# Patient Record
Sex: FEMALE | Race: BLACK OR AFRICAN AMERICAN | Employment: PART TIME | URBAN - METROPOLITAN AREA
[De-identification: names, ages, dates, MRNs, and addresses within clinical notes are randomized per-mention and may not be internally consistent; named-entity substitution may affect disease eponyms.]

---

## 2019-11-27 ENCOUNTER — OFFICE VISIT (OUTPATIENT)
Dept: FAMILY MEDICINE CLINIC | Age: 19
End: 2019-11-27

## 2019-11-27 VITALS
OXYGEN SATURATION: 99 % | HEIGHT: 64 IN | HEART RATE: 98 BPM | RESPIRATION RATE: 18 BRPM | SYSTOLIC BLOOD PRESSURE: 142 MMHG | WEIGHT: 194 LBS | BODY MASS INDEX: 33.12 KG/M2 | DIASTOLIC BLOOD PRESSURE: 80 MMHG | TEMPERATURE: 98.2 F

## 2019-11-27 DIAGNOSIS — I82.5Y2 CHRONIC DEEP VEIN THROMBOSIS (DVT) OF PROXIMAL VEIN OF LEFT LOWER EXTREMITY (HCC): ICD-10-CM

## 2019-11-27 DIAGNOSIS — G89.29 CHRONIC PAIN OF LEFT ANKLE: ICD-10-CM

## 2019-11-27 DIAGNOSIS — M32.9 SYSTEMIC LUPUS ERYTHEMATOSUS, UNSPECIFIED SLE TYPE, UNSPECIFIED ORGAN INVOLVEMENT STATUS (HCC): Primary | ICD-10-CM

## 2019-11-27 DIAGNOSIS — M79.605 LEFT LEG PAIN: ICD-10-CM

## 2019-11-27 DIAGNOSIS — M79.672 LEFT FOOT PAIN: ICD-10-CM

## 2019-11-27 DIAGNOSIS — M25.572 CHRONIC PAIN OF LEFT ANKLE: ICD-10-CM

## 2019-11-27 RX ORDER — SIMVASTATIN 20 MG/1
TABLET, FILM COATED ORAL
COMMUNITY
End: 2021-08-24 | Stop reason: SDUPTHER

## 2019-11-27 RX ORDER — ERGOCALCIFEROL 1.25 MG/1
50000 CAPSULE ORAL
COMMUNITY
End: 2022-03-01

## 2019-11-27 RX ORDER — HYDROXYCHLOROQUINE SULFATE 200 MG/1
200 TABLET, FILM COATED ORAL DAILY
COMMUNITY
End: 2021-07-09 | Stop reason: SDUPTHER

## 2019-11-27 RX ORDER — MYCOPHENOLATE MOFETIL 500 MG/1
500 TABLET ORAL 2 TIMES DAILY
COMMUNITY
End: 2019-12-03 | Stop reason: SDUPTHER

## 2019-11-27 RX ORDER — PREDNISONE 10 MG/1
TABLET ORAL
COMMUNITY
End: 2021-11-11

## 2019-11-27 RX ORDER — OMEPRAZOLE 40 MG/1
40 CAPSULE, DELAYED RELEASE ORAL DAILY
COMMUNITY
End: 2021-08-24 | Stop reason: SDUPTHER

## 2019-11-27 NOTE — PROGRESS NOTES
Dennie Floro presents today for   Chief Complaint   Patient presents with    New Patient    Lupus       Is someone accompanying this pt? no    Is the patient using any DME equipment during [address]? no    Depression Screening:  3 most recent PHQ Screens 11/27/2019   Little interest or pleasure in doing things Not at all   Feeling down, depressed, irritable, or hopeless Not at all   Total Score PHQ 2 0       Learning Assessment:  Learning Assessment 11/27/2019   PRIMARY LEARNER Patient   HIGHEST LEVEL OF EDUCATION - PRIMARY LEARNER  GRADUATED HIGH SCHOOL OR GED   BARRIERS PRIMARY LEARNER NONE   CO-LEARNER CAREGIVER No   PRIMARY LANGUAGE ENGLISH   LEARNER PREFERENCE PRIMARY VIDEOS   ANSWERED BY self   RELATIONSHIP SELF       Abuse Screening:  No flowsheet data found. Fall Risk  No flowsheet data found. Health Maintenance reviewed     Health Maintenance Due   Topic Date Due    HPV Age 9Y-34Y (1 - Female 2-dose series) 06/24/2009    DTaP/Tdap/Td series (1 - Tdap) 06/24/2009    PAP AKA CERVICAL CYTOLOGY  06/24/2019    Influenza Age 9 to Adult  08/01/2019   . Coordination of Care:  1. Have you been to the ER, urgent care clinic since your last visit? Hospitalized since your last visit? no    2. Have you seen or consulted any other health care providers outside of the [address] [place] [address] since your last visit? Include any pap smears or colon screening.  no      Last  Checked n/a  Last UDS Checked n/a  Last Pain contract signed: n/a

## 2019-11-27 NOTE — PROGRESS NOTES
HISTORY OF PRESENT ILLNESS  Ladi Paul is a 22 y.o. female. Patient is here to establish care. Patient was recently diagnosed with Lupus last year and she is under the care of a rheumatologist.  She mentions this month her lupus was flared up and she was in the hospital admitted. She lost her grandmother. Patient also had a history of DVT of the left lower leg. She mentions she has been having pain in the left lower leg and she went last week at [facility] and she was told she had no blood clots. I do not have any report of this. She mentions she had so much blood work done at  [facility].   She mentions her left ankle and foot pain has been getting worse. She would like to have x-rays done and she also has an apt with her rheumatologist next week. She will sign a release of her records. New Patient   The history is provided by the patient. This is a new problem. The problem occurs constantly. The problem has not changed since onset. Pertinent negatives include no chest pain, no abdominal pain, no headaches and no shortness of breath. Lupus   The history is provided by the patient. This is a chronic problem. The problem occurs constantly. The problem has been gradually improving. Pertinent negatives include no chest pain, no abdominal pain, no headaches and no shortness of breath. The symptoms are aggravated by stress. The symptoms are relieved by medications. Treatments tried: on meds. Foot Pain   The history is provided by the patient. This is a chronic problem. The problem occurs constantly. The problem has been gradually worsening. Pertinent negatives include no chest pain, no abdominal pain, no headaches and no shortness of breath. Ankle Pain    Associated symptoms include back pain and neck pain. Review of Systems   Constitutional: Positive for malaise/fatigue. Negative for chills and fever.    HENT: Negative for congestion, ear discharge, ear pain, hearing loss, sinus pain and sore throat. Eyes: Negative for blurred vision, double vision and pain. Respiratory: Negative for cough, hemoptysis, shortness of breath and wheezing. Cardiovascular: Negative for chest pain, palpitations and leg swelling. Gastrointestinal: Negative for abdominal pain, blood in stool, constipation, nausea and vomiting. Musculoskeletal: Positive for back pain, falls, joint pain and neck pain. Neurological: Negative for dizziness and headaches. Psychiatric/Behavioral: Negative for depression, hallucinations, substance abuse and suicidal ideas. The patient is nervous/anxious and has insomnia. Visit Vitals  /80   Pulse 98   Temp 98.2 °F (36.8 °C) (Oral)   Resp 18   Ht 5' 4\" (1.626 m)   Wt 194 lb (88 kg)   SpO2 99%   BMI 33.30 kg/m²     Family History   Problem Relation Age of Onset    Hypertension Father     Asthma Maternal Aunt     Headache Maternal Aunt     Hypertension Maternal Aunt     Migraines Maternal Aunt     Arthritis-osteo Maternal Grandmother     Diabetes Maternal Grandmother     Elevated Lipids Maternal Grandmother     Hypertension Maternal Grandmother     Stroke Maternal Grandmother     Heart Disease Maternal Grandmother     Hypertension Maternal Grandfather      There is no problem list on file for this patient. Past Medical History:   Diagnosis Date    Lupus ([address] 09/2018     Past Surgical History:   Procedure Laterality Date    HX TONSILLECTOMY      HX WISDOM TEETH EXTRACTION       Current Outpatient Medications on File Prior to Visit   Medication Sig Dispense Refill    mycophenolate (CELLCEPT) 500 mg tablet Take 500 mg by mouth two (2) times a day.  predniSONE (DELTASONE) 10 mg tablet Take  by mouth daily (with breakfast).  hydroxychloroquine (PLAQUENIL) 200 mg tablet Take 200 mg by mouth daily. Every 12 hours      simvastatin (ZOCOR) 20 mg tablet Take  by mouth nightly.       ergocalciferol (VITAMIN D2) 50,000 unit capsule Take 50,000 Units by mouth.  omeprazole (PRILOSEC) 40 mg capsule Take 40 mg by mouth daily. No current facility-administered medications on file prior to visit. Allergies   Allergen Reactions    Pcn [Penicillins] Hives and Itching       Physical Exam  Constitutional:       Appearance: Normal appearance. HENT:      Head: Normocephalic and atraumatic. Right Ear: Tympanic membrane normal. There is no impacted cerumen. Left Ear: Tympanic membrane normal.      Nose: Congestion present. No rhinorrhea. Mouth/Throat:      Mouth: Mucous membranes are moist.      Pharynx: No oropharyngeal exudate or posterior oropharyngeal erythema. Eyes:      General:         Right eye: No discharge. Left eye: No discharge. Conjunctiva/sclera: Conjunctivae normal.      Pupils: Pupils are equal, round, and reactive to light. Neck:      Musculoskeletal: Normal range of motion. No neck rigidity. Cardiovascular:      Rate and Rhythm: Normal rate and regular rhythm. Heart sounds: No murmur. Pulmonary:      Effort: Pulmonary effort is normal. No respiratory distress. Breath sounds: Normal breath sounds. No stridor. No wheezing or rhonchi. Abdominal:      General: Bowel sounds are normal. There is no distension. Palpations: Abdomen is soft. Tenderness: There is no tenderness. Musculoskeletal:         General: Swelling present. Left ankle: She exhibits decreased range of motion and swelling. Left lower leg: She exhibits tenderness, bony tenderness and swelling. Edema present. Legs:         Feet:    Neurological:      General: No focal deficit present. Mental Status: She is alert.    Psychiatric:         Mood and Affect: Mood normal.         ASSESSMENT and PLAN  History of lupus with left  lower leg pain / left ankle pain and left foot pain:  - dicussed  Ordering x-rays   - continue current medical therapy   - please follow up with rheumatologist

## 2019-12-02 ENCOUNTER — TELEPHONE (OUTPATIENT)
Dept: FAMILY MEDICINE CLINIC | Age: 19
End: 2019-12-02

## 2019-12-03 ENCOUNTER — OFFICE VISIT (OUTPATIENT)
Dept: FAMILY MEDICINE CLINIC | Age: 19
End: 2019-12-03

## 2019-12-03 VITALS
RESPIRATION RATE: 18 BRPM | SYSTOLIC BLOOD PRESSURE: 133 MMHG | TEMPERATURE: 96.8 F | HEIGHT: 64 IN | HEART RATE: 107 BPM | OXYGEN SATURATION: 99 % | DIASTOLIC BLOOD PRESSURE: 78 MMHG | BODY MASS INDEX: 32.44 KG/M2 | WEIGHT: 190 LBS

## 2019-12-03 DIAGNOSIS — I82.5Y2 CHRONIC DEEP VEIN THROMBOSIS (DVT) OF PROXIMAL VEIN OF LEFT LOWER EXTREMITY (HCC): ICD-10-CM

## 2019-12-03 DIAGNOSIS — R59.0 AXILLARY LYMPHADENOPATHY: ICD-10-CM

## 2019-12-03 DIAGNOSIS — M79.605 LEFT LEG PAIN: Primary | ICD-10-CM

## 2019-12-03 DIAGNOSIS — R59.0 MEDIASTINAL LYMPHADENOPATHY: ICD-10-CM

## 2019-12-03 DIAGNOSIS — I82.4Y2 ACUTE DEEP VEIN THROMBOSIS (DVT) OF PROXIMAL VEIN OF LEFT LOWER EXTREMITY (HCC): ICD-10-CM

## 2019-12-03 RX ORDER — MYCOPHENOLATE MOFETIL 500 MG/1
1000 TABLET ORAL 2 TIMES DAILY
Qty: 120 TAB | Refills: 0 | Status: SHIPPED | OUTPATIENT
Start: 2019-12-03 | End: 2019-12-18 | Stop reason: SDUPTHER

## 2019-12-03 NOTE — PROGRESS NOTES
HISTORY OF PRESENT ILLNESS  Ladi Cervnates is a 22 y.o. female. Patient is here for her hospital follow up shortly after she saw me in the office last week. She was found to have a acute left DVT. She went to [facility].  She was started on the xeralto pack and is currently taking 15 mg twice a day. She mentions she has an apt in january with vascular specialist. I have discussed referral to hematology as she has a history of lupus and for further anticoagulation work up. She is aware she must stay on this medication as it was stopped in the past.  Both duplex and ct chest have been reviewed. Ct chest shows the following :Suboptimal exam but negative for PE. 2. Mild multifocal mediastinal and bilateral axillary adenopathy, unchanged and of unknown etiology. Differential diagnosis would include sarcoidosis and lymphoproliferative disorders. After reviewing this I have discussed referral to a lung specialist. She is already under the care of a nephrologist.    I have also discussed that she should be taking her prednisone 5 mg not 10 mg. She mentions she only 3 pills left of her medication for lupus and would like a refill as she will  Be changing her rheumatologist.  She mentions she contonues to have joint pain but feels better. Hospital Follow Up   The history is provided by the patient. This is a new problem. The current episode started more than 2 days ago. The problem occurs constantly. The problem has been gradually improving. Pertinent negatives include no chest pain, no abdominal pain, no headaches and no shortness of breath. The symptoms are aggravated by walking, standing and exertion. Nothing relieves the symptoms. Treatments tried: oral anticoagulation  The treatment provided moderate relief. Review of Systems   Constitutional: Positive for malaise/fatigue. Negative for fever. HENT: Negative for congestion, hearing loss, nosebleeds, sinus pain and sore throat.     Eyes: Negative for blurred vision, double vision, pain and discharge. Respiratory: Negative for cough, sputum production, shortness of breath, wheezing and stridor. Cardiovascular: Negative for chest pain. Gastrointestinal: Negative for abdominal pain, blood in stool and constipation. Genitourinary: Negative for dysuria and frequency. Musculoskeletal: Positive for joint pain. Skin: Negative for rash. Neurological: Negative for tingling, focal weakness, weakness and headaches. Endo/Heme/Allergies: Negative for environmental allergies. Psychiatric/Behavioral: Negative for depression. The patient is not nervous/anxious. Visit Vitals  /78   Pulse (!) 107   Temp 96.8 °F (36 °C) (Oral)   Resp 18   Ht 5' 4\" (1.626 m)   Wt 190 lb (86.2 kg)   SpO2 99%   BMI 32.61 kg/m²       Physical Exam  Constitutional:       General: She is not in acute distress. Appearance: Normal appearance. She is obese. She is not ill-appearing. HENT:      Head: Normocephalic and atraumatic. Right Ear: Tympanic membrane normal. There is no impacted cerumen. Left Ear: Tympanic membrane normal. There is no impacted cerumen. Nose: Nose normal. No congestion or rhinorrhea. Mouth/Throat:      Mouth: Mucous membranes are moist.      Pharynx: No oropharyngeal exudate or posterior oropharyngeal erythema. Eyes:      General:         Right eye: No discharge. Left eye: No discharge. Extraocular Movements: Extraocular movements intact. Conjunctiva/sclera: Conjunctivae normal.      Pupils: Pupils are equal, round, and reactive to light. Neck:      Musculoskeletal: Normal range of motion. Cardiovascular:      Rate and Rhythm: Normal rate and regular rhythm. Heart sounds: No murmur. Pulmonary:      Effort: Pulmonary effort is normal. No respiratory distress. Breath sounds: Normal breath sounds. No wheezing or rhonchi. Abdominal:      General: Bowel sounds are normal. There is no distension. Palpations: Abdomen is soft. Tenderness: There is no tenderness. Musculoskeletal:         General: Swelling present. Right lower leg: Edema present. Left lower leg: Edema present. Lymphadenopathy:      Cervical: No cervical adenopathy. Skin:     General: Skin is warm. Findings: No erythema. Neurological:      General: No focal deficit present. Mental Status: She is alert.    Psychiatric:         Mood and Affect: Mood normal.         ASSESSMENT and PLAN  Acute on chronic left lower leg DVT:  - please continue xeralto pack  - ok to keep apt with vascular specialist  - discussed CT scan of chest   - discussed referral to hematology  And pulmonary     SLE:  - please follow up with rheumatology

## 2019-12-04 ENCOUNTER — TELEPHONE (OUTPATIENT)
Dept: FAMILY MEDICINE CLINIC | Age: 19
End: 2019-12-04

## 2019-12-05 NOTE — TELEPHONE ENCOUNTER
I have spoken to patient I have also given her referral information for pulmonary specialist dr Theresa Lopez however they do not participate in [place]. Iris Jara can you see which pulmonary specialist she can be referred to  And give her referral information and the referral can be changed over to this provider.

## 2019-12-05 NOTE — TELEPHONE ENCOUNTER
I spoke to patient, informed her will work on her referral tomorrow and that her letter is ready. I also told her that if we cant find someone to takes her insurance then, she will have to call her insurance to find out.  She stated ok

## 2019-12-06 ENCOUNTER — TELEPHONE (OUTPATIENT)
Dept: FAMILY MEDICINE CLINIC | Age: 19
End: 2019-12-06

## 2019-12-06 NOTE — LETTER
NOTIFICATION RETURN TO WORK / SCHOOL 
 
12/9/2019 9:18 AM 
 
Ms. Tanmay Molina [address] [address] To Whom It May Concern: 
 
Tanmay Molina is currently under the care of [facility]. She will return to work/school on 12/16/19. If there are questions or concerns please have the patient contact our office. Sincerely, Claudia Mesa MD

## 2019-12-06 NOTE — TELEPHONE ENCOUNTER
Return to work letter was done for pt to return to work on 12/16/19. Pt is requesting it be changed for her to return to work on 12/11/19.

## 2019-12-09 NOTE — TELEPHONE ENCOUNTER
Pt called and stated she has a new \"pain\" in the leg and would like to speak to provider's nurse. Sending message to Nurse Koo.

## 2019-12-10 NOTE — TELEPHONE ENCOUNTER
I called patient, informed her we are unable to find  Some to take her insurance, I told her to call her insurance and ask them who is in her network and to call us back, she stated ok

## 2019-12-18 ENCOUNTER — TELEPHONE (OUTPATIENT)
Dept: FAMILY MEDICINE CLINIC | Age: 19
End: 2019-12-18

## 2019-12-18 DIAGNOSIS — M32.9 SYSTEMIC LUPUS ERYTHEMATOSUS, UNSPECIFIED SLE TYPE, UNSPECIFIED ORGAN INVOLVEMENT STATUS (HCC): ICD-10-CM

## 2019-12-18 DIAGNOSIS — R59.0 AXILLARY LYMPHADENOPATHY: Primary | ICD-10-CM

## 2019-12-18 DIAGNOSIS — R59.0 MEDIASTINAL LYMPHADENOPATHY: ICD-10-CM

## 2019-12-18 RX ORDER — MYCOPHENOLATE MOFETIL 500 MG/1
1000 TABLET ORAL 2 TIMES DAILY
Qty: 120 TAB | Refills: 0 | Status: SHIPPED | OUTPATIENT
Start: 2019-12-18 | End: 2021-07-09 | Stop reason: SDUPTHER

## 2019-12-18 RX ORDER — MYCOPHENOLATE MOFETIL 500 MG/1
500 TABLET ORAL 2 TIMES DAILY
Qty: 90 TAB | Refills: 3 | Status: SHIPPED | OUTPATIENT
Start: 2019-12-18 | End: 2022-05-03

## 2019-12-18 NOTE — TELEPHONE ENCOUNTER
Pt called and stated that she wants a referral sent to: [facility], Dr Jaya Bustamante in [place], please.

## 2019-12-19 ENCOUNTER — TELEPHONE (OUTPATIENT)
Dept: FAMILY MEDICINE CLINIC | Age: 19
End: 2019-12-19

## 2019-12-19 NOTE — TELEPHONE ENCOUNTER
Dr Alegre Alert office called and requested we refax referral info to them there fax machine was not working earlier this week.

## 2019-12-23 NOTE — TELEPHONE ENCOUNTER
Dr Harmon Alt   [facility]  [address]a [address]illo [address], [address] [place] [address]  Phone: [phone]  Fax: [phone]    Faxed notes, confirmation received.

## 2019-12-27 ENCOUNTER — TELEPHONE (OUTPATIENT)
Dept: FAMILY MEDICINE CLINIC | Age: 19
End: 2019-12-27

## 2019-12-27 DIAGNOSIS — I82.4Y2 ACUTE DEEP VEIN THROMBOSIS (DVT) OF PROXIMAL VEIN OF LEFT LOWER EXTREMITY (HCC): Primary | ICD-10-CM

## 2019-12-27 NOTE — TELEPHONE ENCOUNTER
Patient states that Dr Jami Lovell told her to call for a refill of Xarelto. Patient is currently completing her starter pack and is taking 20mg daily. Informed patient that her provider is not in the office. [facility] since patient states that she will be out of her medication on Sunday.

## 2020-05-21 NOTE — TELEPHONE ENCOUNTER
Requested Prescriptions     Pending Prescriptions Disp Refills    mycophenolate (CELLCEPT) 500 mg tablet       Sig: Take 1 Tab by mouth two (2) times a day.  mycophenolate (CELLCEPT) 500 mg tablet 120 Tab 0     Sig: Take 2 Tabs by mouth two (2) times a day.
fair balance

## 2021-07-09 ENCOUNTER — OFFICE VISIT (OUTPATIENT)
Dept: FAMILY MEDICINE CLINIC | Age: 21
End: 2021-07-09
Payer: MEDICAID

## 2021-07-09 ENCOUNTER — APPOINTMENT (OUTPATIENT)
Dept: FAMILY MEDICINE CLINIC | Age: 21
End: 2021-07-09

## 2021-07-09 VITALS
BODY MASS INDEX: 38 KG/M2 | TEMPERATURE: 97.5 F | SYSTOLIC BLOOD PRESSURE: 120 MMHG | RESPIRATION RATE: 15 BRPM | HEIGHT: 64 IN | DIASTOLIC BLOOD PRESSURE: 86 MMHG | WEIGHT: 222.6 LBS | OXYGEN SATURATION: 100 % | HEART RATE: 74 BPM

## 2021-07-09 DIAGNOSIS — M32.9 SLE (SYSTEMIC LUPUS ERYTHEMATOSUS RELATED SYNDROME) (HCC): ICD-10-CM

## 2021-07-09 DIAGNOSIS — Z00.00 ROUTINE GENERAL MEDICAL EXAMINATION AT A HEALTH CARE FACILITY: Primary | ICD-10-CM

## 2021-07-09 DIAGNOSIS — Z00.00 ROUTINE GENERAL MEDICAL EXAMINATION AT A HEALTH CARE FACILITY: ICD-10-CM

## 2021-07-09 DIAGNOSIS — E55.9 VITAMIN D DEFICIENCY: ICD-10-CM

## 2021-07-09 PROCEDURE — 99395 PREV VISIT EST AGE 18-39: CPT | Performed by: NURSE PRACTITIONER

## 2021-07-09 RX ORDER — ALBUTEROL SULFATE 90 UG/1
2 AEROSOL, METERED RESPIRATORY (INHALATION)
COMMUNITY

## 2021-07-09 RX ORDER — HYDROCORTISONE BUTYRATE 1 MG/G
CREAM TOPICAL
COMMUNITY
Start: 2021-05-21 | End: 2022-04-06

## 2021-07-09 RX ORDER — FLUTICASONE PROPIONATE 50 MCG
2 SPRAY, SUSPENSION (ML) NASAL DAILY
COMMUNITY
End: 2022-04-27 | Stop reason: SDUPTHER

## 2021-07-09 RX ORDER — LORATADINE 10 MG/1
TABLET ORAL
COMMUNITY
End: 2021-08-24 | Stop reason: SDUPTHER

## 2021-07-09 RX ORDER — LOSARTAN POTASSIUM 25 MG/1
25 TABLET ORAL DAILY
COMMUNITY
End: 2021-11-11

## 2021-07-09 NOTE — PROGRESS NOTES
Belinda Johnson presents today for   Chief Complaint   Patient presents with    New Patient    Physical       Is someone accompanying this pt? no    Is the patient using any DME equipment during OV? no    Depression Screening:  3 most recent PHQ Screens 7/9/2021   Little interest or pleasure in doing things Not at all   Feeling down, depressed, irritable, or hopeless Not at all   Total Score PHQ 2 0       Learning Assessment:  Learning Assessment 7/9/2021   PRIMARY LEARNER Patient   HIGHEST LEVEL OF EDUCATION - PRIMARY LEARNER  -   BARRIERS PRIMARY LEARNER -   CO-LEARNER CAREGIVER -   PRIMARY LANGUAGE ENGLISH   LEARNER PREFERENCE PRIMARY DEMONSTRATION     READING   ANSWERED BY patient    RELATIONSHIP SELF       Travel Screening:    Travel Screening     Question   Response    In the last month, have you been in contact with someone who was confirmed or suspected to have [address] / COVID-19? No / Unsure    Have you had a COVID-19 viral test in the last 14 days? No    Do you have any of the following new or worsening symptoms? None of these    Have you traveled internationally or domestically in the last month? No      Travel History   Travel since 06/09/21     No documented travel since 06/09/21          Health Maintenance reviewed and discussed and ordered per Provider. Health Maintenance Due   Topic Date Due    Hepatitis C Screening  Never done    COVID-19 Vaccine (1) Never done    HPV Age 9Y-34Y (2 - 3-dose series) 05/26/2017    DTaP/Tdap/Td series (1 - Tdap) Never done    PAP AKA CERVICAL CYTOLOGY  Never done    Lipid Screen  02/27/2021   . Coordination of Care:  1. Have you been to the ER, urgent care clinic since your last visit? Hospitalized since your last visit? Yes.     2. Have you seen or consulted any other health care providers outside of the [address] [place] [address] since your last visit? Include any pap smears or colon screening. Yes.

## 2021-07-09 NOTE — PROGRESS NOTES
HPI  19 y.o. female is presenting for annual exam and complete physical.  Her gynecologic and breast care are done elsewhere by her OB/GYN physician. Most recent Pap smear : unsure  Prior Pap result: Normal.  Prior cervical/vaginal disease: Normal exam without visible pathology. Prior cervical treatment: no treatment. Specific concerns today: none. Past Medical History:   Diagnosis Date    Acid reflux     [facility] ([facility] [address]     Lupus ([facility] [address] 09/2018       Past Surgical History:   Procedure Laterality Date    HX TONSIL AND ADENOIDECTOMY      HX TONSILLECTOMY      HX WISDOM TEETH EXTRACTION         Family History   Problem Relation Age of Onset    Hypertension Father     Asthma Maternal Aunt     Headache Maternal Aunt     Hypertension Maternal Aunt     Migraines Maternal Aunt     Arthritis-osteo Maternal Grandmother     Diabetes Maternal Grandmother     Elevated Lipids Maternal Grandmother     Hypertension Maternal Grandmother     Stroke Maternal Grandmother     Heart Disease Maternal Grandmother     Hypertension Maternal Grandfather        Social History     Tobacco Use    Smoking status: Never Smoker    Smokeless tobacco: Never Used   Substance Use Topics    Alcohol use: Not Currently    Drug use: Never       Current Outpatient Medications on File Prior to Visit   Medication Sig Dispense Refill    losartan (COZAAR) 25 mg tablet Take 25 mg by mouth daily.  albuterol (PROVENTIL HFA, VENTOLIN HFA, PROAIR HFA) 90 mcg/actuation inhaler Take  by inhalation.  fluticasone propionate (FLONASE) 50 mcg/actuation nasal spray fluticasone propionate 50 mcg/actuation nasal spray,suspension   Spray 2 sprays every day by intranasal route for 30 days.  hydrocortisone butyr-emollient 0.1 % topical cream       loratadine (CLARITIN) 10 mg tablet loratadine 10 mg tablet   Take 1 tablet every day by oral route for 30 days.  rivaroxaban (XARELTO) 20 mg tab tablet Take 1 Tab by mouth daily. 30 Tab 1    mycophenolate (CELLCEPT) 500 mg tablet Take 1 Tab by mouth two (2) times a day. 90 Tab 3    simvastatin (ZOCOR) 20 mg tablet Take  by mouth nightly.  omeprazole (PRILOSEC) 40 mg capsule Take 40 mg by mouth daily.  hydroxychloroquine (PLAQUENIL) 200 mg tablet Take 200 mg by mouth daily.  predniSONE (DELTASONE) 10 mg tablet Take  by mouth daily (with breakfast). (Patient not taking: Reported on 7/9/2021)      ergocalciferol (VITAMIN D2) 50,000 unit capsule Take 50,000 Units by mouth. (Patient not taking: Reported on 7/9/2021)      simvastatin (ZOCOR) 10 mg tablet Take  by mouth nightly. (Patient not taking: Reported on 7/9/2021)      lisinopril (PRINIVIL, ZESTRIL) 10 mg tablet Take  by mouth daily. (Patient not taking: Reported on 7/9/2021)      predniSONE (DELTASONE) 20 mg tablet Take  by mouth daily (with breakfast). (Patient not taking: Reported on 7/9/2021)      predniSONE (DELTASONE) 20 mg tablet Take 40 mg by mouth daily (with breakfast). (Patient not taking: Reported on 7/9/2021) 8 Tab 0    nystatin (MYCOSTATIN) 100,000 unit/mL suspension Take 5 mL by mouth four (4) times daily. swish and spit (Patient not taking: Reported on 7/9/2021) 120 mL 0     No current facility-administered medications on file prior to visit. Allergies   Allergen Reactions    Cephalexin Other (comments)     Patient c/o severe chest pain      Metronidazole Other (comments)    Pcn [Penicillins] Hives    Pcn [Penicillins] Hives and Itching       Review of Systems   Constitutional: Negative for chills, fever, malaise/fatigue and weight loss. HENT: Negative for congestion, ear pain, hearing loss, sinus pain, sore throat and tinnitus. Eyes: Negative for blurred vision, double vision, photophobia and pain. Respiratory: Negative for cough and shortness of breath. Cardiovascular: Negative for chest pain, palpitations and leg swelling.    Gastrointestinal: Negative for abdominal pain, constipation, diarrhea, heartburn, nausea and vomiting. Genitourinary: Negative for dysuria, frequency and urgency. Musculoskeletal: Negative for back pain, joint pain and myalgias. Skin: Negative for rash. Neurological: Negative for dizziness and headaches. Psychiatric/Behavioral: Negative for depression and suicidal ideas. The patient is not nervous/anxious. PE  /86 (BP 1 Location: Left upper arm, BP Patient Position: Sitting, BP Cuff Size: Large adult)   Pulse 74   Temp 97.5 °F (36.4 °C) (Temporal)   Resp 15   Ht 5' 4\" (1.626 m)   Wt 222 lb 9.6 oz (101 kg)   LMP 07/02/2021 (Approximate)   SpO2 100%   BMI 38.21 kg/m²     Physical Exam  Vitals reviewed. Constitutional:       General: She is not in acute distress. Appearance: Normal appearance. HENT:      Head: Normocephalic and atraumatic. Right Ear: Tympanic membrane, ear canal and external ear normal.      Left Ear: Tympanic membrane, ear canal and external ear normal.      Nose: Nose normal. No nasal deformity, congestion or rhinorrhea. Mouth/Throat:      Lips: Pink. No lesions. Mouth: Mucous membranes are moist. No oral lesions. Dentition: Normal dentition. Tongue: No lesions. Pharynx: Oropharynx is clear. Eyes:      General: Lids are normal.      Extraocular Movements: Extraocular movements intact. Conjunctiva/sclera: Conjunctivae normal.      Pupils: Pupils are equal, round, and reactive to light. Neck:      Thyroid: No thyroid mass, thyromegaly or thyroid tenderness. Cardiovascular:      Rate and Rhythm: Normal rate and regular rhythm. Pulses:           Dorsalis pedis pulses are 2+ on the right side and 2+ on the left side. Heart sounds: S1 normal and S2 normal. No murmur heard. No friction rub. No gallop. No S3 or S4 sounds. Abdominal:      General: Abdomen is flat. Bowel sounds are normal. There is no distension. Palpations: Abdomen is soft.  There is no hepatomegaly, splenomegaly or mass. Tenderness: There is no abdominal tenderness. There is no guarding or rebound. Musculoskeletal:      Cervical back: Full passive range of motion without pain. Right lower leg: No edema. Left lower leg: No edema. Lymphadenopathy:      Head:      Right side of head: No submental, submandibular, tonsillar, preauricular, posterior auricular or occipital adenopathy. Left side of head: No submental, submandibular, tonsillar, preauricular, posterior auricular or occipital adenopathy. Cervical: No cervical adenopathy. Upper Body:      Right upper body: No supraclavicular adenopathy. Left upper body: No supraclavicular adenopathy. Skin:     General: Skin is warm and dry. Capillary Refill: Capillary refill takes less than 2 seconds. Neurological:      General: No focal deficit present. Mental Status: She is alert and oriented to person, place, and time. Cranial Nerves: Cranial nerves are intact. Sensory: Sensation is intact. Motor: Motor function is intact. Coordination: Coordination is intact. Gait: Gait is intact. Deep Tendon Reflexes:      Reflex Scores:       Patellar reflexes are 2+ on the right side and 2+ on the left side. Psychiatric:         Attention and Perception: Attention and perception normal.         Mood and Affect: Mood and affect normal.         Speech: Speech normal.         Behavior: Behavior normal.         Thought Content: Thought content normal.         Cognition and Memory: Cognition and memory normal.         Judgment: Judgment normal.           Assessment/Plan  1. Preventive care  Fasting labs  Check on last pap and schedule well woman exam as appropriate    2.  SLE  Continue to FU with rheumatology

## 2021-07-10 LAB
25(OH)D3 SERPL-MCNC: 20.6 NG/ML (ref 32–100)
A-G RATIO,AGRAT: 1.6 RATIO (ref 1.1–2.6)
ABSOLUTE LYMPHOCYTE COUNT, 10803: 1.4 K/UL (ref 1–4.8)
ALBUMIN SERPL-MCNC: 4.4 G/DL (ref 3.5–5)
ALP SERPL-CCNC: 109 U/L (ref 25–115)
ALT SERPL-CCNC: 16 U/L (ref 5–40)
ANION GAP SERPL CALC-SCNC: 14.1 MMOL/L (ref 3–15)
AST SERPL W P-5'-P-CCNC: 20 U/L (ref 10–37)
BASOPHILS # BLD: 0 K/UL (ref 0–0.2)
BASOPHILS NFR BLD: 1 % (ref 0–2)
BILIRUB SERPL-MCNC: 0.3 MG/DL (ref 0.2–1.2)
BUN SERPL-MCNC: 11 MG/DL (ref 6–22)
CALCIUM SERPL-MCNC: 9.7 MG/DL (ref 8.4–10.5)
CHLORIDE SERPL-SCNC: 100 MMOL/L (ref 98–110)
CHOLEST SERPL-MCNC: 185 MG/DL (ref 110–200)
CO2 SERPL-SCNC: 24 MMOL/L (ref 20–32)
CREAT SERPL-MCNC: 0.6 MG/DL (ref 0.5–1.2)
EOSINOPHIL # BLD: 0 K/UL (ref 0–0.5)
EOSINOPHIL NFR BLD: 1 % (ref 0–6)
ERYTHROCYTE [DISTWIDTH] IN BLOOD BY AUTOMATED COUNT: 13.4 % (ref 10–15.5)
GFRAA, 66117: >60
GFRNA, 66118: >60
GLOBULIN,GLOB: 2.7 G/DL (ref 2–4)
GLUCOSE SERPL-MCNC: 78 MG/DL (ref 70–99)
GRANULOCYTES,GRANS: 61 % (ref 40–75)
HCT VFR BLD AUTO: 43.9 % (ref 35.1–46.5)
HDLC SERPL-MCNC: 2.8 MG/DL (ref 0–5)
HDLC SERPL-MCNC: 67 MG/DL
HGB BLD-MCNC: 13.2 G/DL (ref 11.7–15.5)
LDL/HDL RATIO,LDHD: 1.6
LDLC SERPL CALC-MCNC: 106 MG/DL (ref 50–99)
LYMPHOCYTES, LYMLT: 29 % (ref 20–45)
MCH RBC QN AUTO: 27 PG (ref 26–34)
MCHC RBC AUTO-ENTMCNC: 30 G/DL (ref 31–36)
MCV RBC AUTO: 90 FL (ref 81–99)
MONOCYTES # BLD: 0.5 K/UL (ref 0.1–1)
MONOCYTES NFR BLD: 9 % (ref 3–12)
NEUTROPHILS # BLD AUTO: 3 K/UL (ref 1.8–7.7)
NON-HDL CHOLESTEROL, 011976: 118 MG/DL
PLATELET # BLD AUTO: 287 K/UL (ref 140–440)
PMV BLD AUTO: 11 FL (ref 9–13)
POTASSIUM SERPL-SCNC: 4.4 MMOL/L (ref 3.5–5.5)
PROT SERPL-MCNC: 7.1 G/DL (ref 6.4–8.3)
RBC # BLD AUTO: 4.89 M/UL (ref 3.8–5.2)
SODIUM SERPL-SCNC: 138 MMOL/L (ref 133–145)
TRIGL SERPL-MCNC: 61 MG/DL (ref 40–149)
TSH SERPL DL<=0.005 MIU/L-ACNC: 1.05 MCU/ML (ref 0.27–4.2)
VLDLC SERPL CALC-MCNC: 12 MG/DL (ref 8–30)
WBC # BLD AUTO: 4.9 K/UL (ref 4–11)

## 2021-08-03 DIAGNOSIS — I82.4Y2 ACUTE DEEP VEIN THROMBOSIS (DVT) OF PROXIMAL VEIN OF LEFT LOWER EXTREMITY (HCC): ICD-10-CM

## 2021-08-03 NOTE — TELEPHONE ENCOUNTER
Last refilled in system 12/27/19 for 30 with 1 .  Last OV 7/9/21   Future Appointments   Date Time Provider Timur Mackey   8/16/2021  3:00 PM BLUE PruittMA BS [facility]B

## 2021-08-24 ENCOUNTER — OFFICE VISIT (OUTPATIENT)
Dept: FAMILY MEDICINE CLINIC | Age: 21
End: 2021-08-24
Payer: MEDICAID

## 2021-08-24 VITALS
TEMPERATURE: 98.1 F | WEIGHT: 223 LBS | RESPIRATION RATE: 16 BRPM | DIASTOLIC BLOOD PRESSURE: 77 MMHG | SYSTOLIC BLOOD PRESSURE: 118 MMHG | OXYGEN SATURATION: 100 % | BODY MASS INDEX: 38.07 KG/M2 | HEIGHT: 64 IN | HEART RATE: 74 BPM

## 2021-08-24 DIAGNOSIS — M32.9 SYSTEMIC LUPUS ERYTHEMATOSUS, UNSPECIFIED SLE TYPE, UNSPECIFIED ORGAN INVOLVEMENT STATUS (HCC): ICD-10-CM

## 2021-08-24 DIAGNOSIS — J30.9 ALLERGIC RHINITIS WITH POSTNASAL DRIP: ICD-10-CM

## 2021-08-24 DIAGNOSIS — R51.9 ACUTE NONINTRACTABLE HEADACHE, UNSPECIFIED HEADACHE TYPE: ICD-10-CM

## 2021-08-24 DIAGNOSIS — R09.82 ALLERGIC RHINITIS WITH POSTNASAL DRIP: ICD-10-CM

## 2021-08-24 DIAGNOSIS — Z86.718 HISTORY OF DVT (DEEP VEIN THROMBOSIS): Primary | ICD-10-CM

## 2021-08-24 PROCEDURE — 99213 OFFICE O/P EST LOW 20 MIN: CPT | Performed by: NURSE PRACTITIONER

## 2021-08-24 RX ORDER — FLUTICASONE PROPIONATE 44 UG/1
2 AEROSOL, METERED RESPIRATORY (INHALATION) 2 TIMES DAILY
COMMUNITY
Start: 2021-08-16

## 2021-08-24 RX ORDER — SIMVASTATIN 20 MG/1
20 TABLET, FILM COATED ORAL
Qty: 90 TABLET | Refills: 0 | Status: SHIPPED | OUTPATIENT
Start: 2021-08-24 | End: 2022-03-01 | Stop reason: ALTCHOICE

## 2021-08-24 RX ORDER — LORATADINE 10 MG/1
TABLET ORAL
Qty: 90 TABLET | Refills: 1 | Status: SHIPPED | OUTPATIENT
Start: 2021-08-24 | End: 2022-04-27 | Stop reason: SDUPTHER

## 2021-08-24 RX ORDER — OMEPRAZOLE 40 MG/1
40 CAPSULE, DELAYED RELEASE ORAL DAILY
Qty: 90 CAPSULE | Refills: 1 | Status: SHIPPED | OUTPATIENT
Start: 2021-08-24 | End: 2022-04-06

## 2021-08-24 NOTE — PROGRESS NOTES
Umu Landeros is a 19 y.o. female (: 2000) presenting to address:    Chief Complaint   Patient presents with    Medication Evaluation     discuss blood thinners . has been on medication since 2019 would like to know how much longer she has to take it .  Medication Refill     zocor . acid reflux     Headache     pain that moves from one side to the other     Nasal Discharge     post nasal drip        Vitals:    21 0913   BP: 118/77   Pulse: 74   Resp: 16   Temp: 98.1 °F (36.7 °C)   TempSrc: Temporal   SpO2: 100%   Weight: 223 lb (101.2 kg)   Height: 5' 4\" (1.626 m)   PainSc:   0 - No pain   LMP: 2021       Hearing/Vision:   No exam data present    Learning Assessment:     Learning Assessment 2021   PRIMARY LEARNER Patient   HIGHEST LEVEL OF EDUCATION - PRIMARY LEARNER  -   BARRIERS PRIMARY LEARNER -   CO-LEARNER CAREGIVER -   PRIMARY LANGUAGE ENGLISH   LEARNER PREFERENCE PRIMARY DEMONSTRATION     READING   ANSWERED BY patient    RELATIONSHIP SELF     Depression Screening:     3 most recent PHQ Screens 2021   Little interest or pleasure in doing things Not at all   Feeling down, depressed, irritable, or hopeless Not at all   Total Score PHQ 2 0     Fall Risk Assessment:   No flowsheet data found. Abuse Screening:     Abuse Screening Questionnaire 2021   Do you ever feel afraid of your partner? N   Are you in a relationship with someone who physically or mentally threatens you? N   Is it safe for you to go home? Y     Coordination of Care Questionaire:   1. Have you been to the ER, urgent care clinic since your last visit? Hospitalized since your last visit? NO    2. Have you seen or consulted any other health care providers outside of the [address] [place] [address] since your last visit? Include any pap smears or colon screening. NO    Advanced Directive:   1. Do you have an Advanced Directive? NO    2. Would you like information on Advanced Directives?  NO

## 2021-08-24 NOTE — PROGRESS NOTES
MADHURI  Divya Stack is a 19y.o. year old female who presents today with multiple complaints. 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 She wants to know when she is able to come off of them. She never saw a hematologist for work up after the clots developed. She asked her rheumatologist about it and he told her she needed to discuss with her PCP. She had a headache off and on the last 2 days but is feeling better today. She is currently on her period so she thinks this might have been the cause of the headache. She is also having some postnasal drip because of her allergies. Has flonase but does not take it daily, takes it when she feels congested. Past Medical History:   Diagnosis Date    Acid reflux     [facility]upus ([facility] [address]     Lupus ([facility] [address] 09/2018     Past Surgical History:   Procedure Laterality Date    HX TONSIL AND ADENOIDECTOMY      HX TONSILLECTOMY      HX WISDOM TEETH EXTRACTION       Social History     Tobacco Use    Smoking status: Never Smoker    Smokeless tobacco: Never Used   Substance Use Topics    Alcohol use: Not Currently    Drug use: Never       Current Outpatient Medications:     Flovent HFA 44 mcg/actuation inhaler, , Disp: , Rfl:     loratadine (CLARITIN) 10 mg tablet, loratadine 10 mg tablet  Take 1 tablet every day by oral route, Disp: 90 Tablet, Rfl: 1    simvastatin (Zocor) 20 mg tablet, Take 1 Tablet by mouth nightly., Disp: 90 Tablet, Rfl: 0    omeprazole (PRILOSEC) 40 mg capsule, Take 1 Capsule by mouth daily. , Disp: 90 Capsule, Rfl: 1    rivaroxaban (XARELTO) 20 mg tab tablet, Take 1 Tablet by mouth daily. , Disp: 30 Tablet, Rfl: 1    losartan (COZAAR) 25 mg tablet, Take 25 mg by mouth daily. , Disp: , Rfl:     albuterol (PROVENTIL HFA, VENTOLIN HFA, PROAIR HFA) 90 mcg/actuation inhaler, Take  by inhalation. , Disp: , Rfl:     fluticasone propionate (FLONASE) 50 mcg/actuation nasal spray, fluticasone propionate 50 mcg/actuation nasal spray,suspension  Spray 2 sprays every day by intranasal route for 30 days. , Disp: , Rfl:     hydrocortisone butyr-emollient 0.1 % topical cream, , Disp: , Rfl:     mycophenolate (CELLCEPT) 500 mg tablet, Take 1 Tab by mouth two (2) times a day. (Patient taking differently: Take 1,000 mg by mouth two (2) times a day.), Disp: 90 Tab, Rfl: 3    hydroxychloroquine (PLAQUENIL) 200 mg tablet, Take 200 mg by mouth daily. , Disp: , Rfl:     predniSONE (DELTASONE) 10 mg tablet, Take  by mouth daily (with breakfast). (Patient not taking: Reported on 7/9/2021), Disp: , Rfl:     ergocalciferol (VITAMIN D2) 50,000 unit capsule, Take 50,000 Units by mouth. (Patient not taking: Reported on 7/9/2021), Disp: , Rfl:     simvastatin (ZOCOR) 10 mg tablet, Take  by mouth nightly. (Patient not taking: Reported on 7/9/2021), Disp: , Rfl:     lisinopril (PRINIVIL, ZESTRIL) 10 mg tablet, Take  by mouth daily. (Patient not taking: Reported on 7/9/2021), Disp: , Rfl:     predniSONE (DELTASONE) 20 mg tablet, Take  by mouth daily (with breakfast). (Patient not taking: Reported on 7/9/2021), Disp: , Rfl:     predniSONE (DELTASONE) 20 mg tablet, Take 40 mg by mouth daily (with breakfast). (Patient not taking: Reported on 7/9/2021), Disp: 8 Tab, Rfl: 0    nystatin (MYCOSTATIN) 100,000 unit/mL suspension, Take 5 mL by mouth four (4) times daily. swish and spit (Patient not taking: Reported on 7/9/2021), Disp: 120 mL, Rfl: 0  Allergies   Allergen Reactions    Cephalexin Other (comments)     Patient c/o severe chest pain      Metronidazole Other (comments)    Pcn [Penicillins] Hives    Pcn [Penicillins] Hives and Itching     Review of Systems   Constitutional: Negative for chills, fever and malaise/fatigue. HENT: Positive for congestion. Negative for ear pain, sinus pain and sore throat. Eyes: Negative for blurred vision and double vision. Respiratory: Negative for cough and shortness of breath.     Cardiovascular: Negative for chest pain, palpitations and leg swelling. Neurological: Positive for headaches (intermittent, resolved). Negative for dizziness. PE  /77   Pulse 74   Temp 98.1 °F (36.7 °C) (Temporal)   Resp 16   Ht 5' 4\" (1.626 m)   Wt 223 lb (101.2 kg)   LMP 08/22/2021   SpO2 100%   BMI 38.28 kg/m²       Physical Exam  Vitals reviewed. Constitutional:       General: She is not in acute distress. Appearance: Normal appearance. HENT:      Head: Normocephalic and atraumatic. Cardiovascular:      Rate and Rhythm: Normal rate and regular rhythm. Heart sounds: S1 normal and S2 normal. No murmur heard. No friction rub. No gallop. No S3 or S4 sounds. Pulmonary:      Effort: Pulmonary effort is normal.      Breath sounds: Normal breath sounds. No wheezing, rhonchi or rales. Musculoskeletal:      Right lower leg: No edema. Left lower leg: No edema. Skin:     General: Skin is warm and dry. Capillary Refill: Capillary refill takes less than 2 seconds. Neurological:      Mental Status: She is alert and oriented to person, place, and time. Assessment/Plan  1. History of DVT  Refer to hematology for further evaluation    2. Postnasal drip  Encouraged daily use of Flonase    3.  Headache  Watch and wait since resolved  Tylenol PRN  FU symptoms worsen or become more persistent

## 2021-09-29 ENCOUNTER — TELEPHONE (OUTPATIENT)
Dept: FAMILY MEDICINE CLINIC | Age: 21
End: 2021-09-29

## 2021-09-29 NOTE — TELEPHONE ENCOUNTER
I advised patient that I had faxed her referral last week to [facility]. I advised to call and see if she can schedule and if they don't have it I will resend it.

## 2021-09-29 NOTE — TELEPHONE ENCOUNTER
Fax number [phone]     Patient was told to call back with a fax number she couldn't recall who she spoke to

## 2021-09-29 NOTE — TELEPHONE ENCOUNTER
Pt stated that the Dr for the Hematologist that Lisa referred her to is leaving the practice. She needs another referral to another Hemo. Pt stated that she may look on the back of her insurance card to find a provider and call us back.  Please advise

## 2021-10-11 ENCOUNTER — TELEPHONE (OUTPATIENT)
Dept: FAMILY MEDICINE CLINIC | Age: 21
End: 2021-10-11

## 2021-10-11 NOTE — TELEPHONE ENCOUNTER
Pt is requesting a call back because she has some questions in regards to her medications. Please return her call at the earliest convenience.

## 2021-10-12 NOTE — TELEPHONE ENCOUNTER
Spoke to patient and she stated that her Psychiatrist  put her on Zoloft, but it interacts with her Roxanne Son. She is wanting to know do you know a medication that won't? Please advise, thank you.

## 2021-10-13 NOTE — TELEPHONE ENCOUNTER
Patient stated she's only seeing a Therapist, she stated that she talk to the Medication Management team. The medication management advised her to speak to you.

## 2021-10-13 NOTE — TELEPHONE ENCOUNTER
Pt called back because she spoke to the medication manager and she recommended cymbalta and another medication that she did not know the name of that medication. She wanted to know if that was safe for her to take. Please advise.

## 2021-10-13 NOTE — TELEPHONE ENCOUNTER
Patient advised that per Marisa Flower it is okay for her to take. If she does have bleeding stop immediately.

## 2021-11-11 ENCOUNTER — OFFICE VISIT (OUTPATIENT)
Dept: FAMILY MEDICINE CLINIC | Age: 21
End: 2021-11-11
Payer: MEDICAID

## 2021-11-11 VITALS
SYSTOLIC BLOOD PRESSURE: 139 MMHG | HEIGHT: 64 IN | WEIGHT: 221.4 LBS | DIASTOLIC BLOOD PRESSURE: 90 MMHG | OXYGEN SATURATION: 99 % | BODY MASS INDEX: 37.8 KG/M2 | RESPIRATION RATE: 16 BRPM | HEART RATE: 87 BPM | TEMPERATURE: 97.4 F

## 2021-11-11 DIAGNOSIS — R10.9 ABDOMINAL CRAMPING: Primary | ICD-10-CM

## 2021-11-11 DIAGNOSIS — N92.6 MISSED PERIOD: ICD-10-CM

## 2021-11-11 LAB
BILIRUB UR QL STRIP: NEGATIVE
GLUCOSE UR-MCNC: NEGATIVE MG/DL
HCG URINE, QL. (POC): NEGATIVE
KETONES P FAST UR STRIP-MCNC: NEGATIVE MG/DL
PH UR STRIP: 5 [PH] (ref 4.6–8)
PROT UR QL STRIP: NEGATIVE
SP GR UR STRIP: 1.03 (ref 1–1.03)
UA UROBILINOGEN AMB POC: NORMAL (ref 0.2–1)
URINALYSIS CLARITY POC: CLEAR
URINALYSIS COLOR POC: YELLOW
URINE BLOOD POC: NEGATIVE
URINE LEUKOCYTES POC: NEGATIVE
URINE NITRITES POC: NEGATIVE
VALID INTERNAL CONTROL?: YES

## 2021-11-11 PROCEDURE — 99213 OFFICE O/P EST LOW 20 MIN: CPT | Performed by: NURSE PRACTITIONER

## 2021-11-11 PROCEDURE — 81025 URINE PREGNANCY TEST: CPT | Performed by: NURSE PRACTITIONER

## 2021-11-11 PROCEDURE — 81003 URINALYSIS AUTO W/O SCOPE: CPT | Performed by: NURSE PRACTITIONER

## 2021-11-11 RX ORDER — OLMESARTAN MEDOXOMIL 20 MG/1
20 TABLET ORAL DAILY
COMMUNITY
End: 2022-02-27

## 2021-11-11 RX ORDER — SERTRALINE HYDROCHLORIDE 50 MG/1
25 TABLET, FILM COATED ORAL DAILY
COMMUNITY
End: 2022-02-27

## 2021-11-11 RX ORDER — CLONAZEPAM 0.5 MG/1
0.5 TABLET ORAL
COMMUNITY
End: 2021-12-20

## 2021-11-11 NOTE — PROGRESS NOTES
MADHURI Gupta is a 19y.o. year old female who presents today with lower abdominal cramping for the past two weeks. It has gotten better and resolved over the past 2 days. She usually gets her period at the beginning of the month but has not gotten it yet this month. Did have a little nausea with the cramping but no vomiting. No diarrhea or blood in the stool. No fevers or chills. No dysuria or urinary frequency/urgency. Thinks maybe it was her heartburn causing the problem. Past Medical History:   Diagnosis Date    Acid reflux     [facility] ([facility] [address]     Lupus ([facility] [address] 09/2018       Past Surgical History:   Procedure Laterality Date    HX TONSIL AND ADENOIDECTOMY      HX TONSILLECTOMY      HX WISDOM TEETH EXTRACTION         Social History     Tobacco Use    Smoking status: Never Smoker    Smokeless tobacco: Never Used   Substance Use Topics    Alcohol use: Not Currently    Drug use: Never         Current Outpatient Medications:     olmesartan (Benicar) 20 mg tablet, Take 20 mg by mouth daily. , Disp: , Rfl:     sertraline (Zoloft) 50 mg tablet, Take  by mouth daily. , Disp: , Rfl:     clonazePAM (KlonoPIN) 0.5 mg tablet, Take 0.5 mg by mouth daily as needed for Anxiety. , Disp: , Rfl:     Flovent HFA 44 mcg/actuation inhaler, , Disp: , Rfl:     loratadine (CLARITIN) 10 mg tablet, loratadine 10 mg tablet  Take 1 tablet every day by oral route, Disp: 90 Tablet, Rfl: 1    simvastatin (Zocor) 20 mg tablet, Take 1 Tablet by mouth nightly., Disp: 90 Tablet, Rfl: 0    omeprazole (PRILOSEC) 40 mg capsule, Take 1 Capsule by mouth daily. , Disp: 90 Capsule, Rfl: 1    rivaroxaban (XARELTO) 20 mg tab tablet, Take 1 Tablet by mouth daily. , Disp: 30 Tablet, Rfl: 1    albuterol (PROVENTIL HFA, VENTOLIN HFA, PROAIR HFA) 90 mcg/actuation inhaler, Take  by inhalation. , Disp: , Rfl:     fluticasone propionate (FLONASE) 50 mcg/actuation nasal spray, fluticasone propionate 50 mcg/actuation nasal spray,suspension Spray 2 sprays every day by intranasal route for 30 days. , Disp: , Rfl:     hydrocortisone butyr-emollient 0.1 % topical cream, , Disp: , Rfl:     mycophenolate (CELLCEPT) 500 mg tablet, Take 1 Tab by mouth two (2) times a day. (Patient taking differently: Take 1,000 mg by mouth two (2) times a day.), Disp: 90 Tab, Rfl: 3    hydroxychloroquine (PLAQUENIL) 200 mg tablet, Take 200 mg by mouth daily. , Disp: , Rfl:     ergocalciferol (VITAMIN D2) 50,000 unit capsule, Take 50,000 Units by mouth. (Patient not taking: Reported on 7/9/2021), Disp: , Rfl:      Allergies   Allergen Reactions    Cephalexin Other (comments)     Patient c/o severe chest pain      Metronidazole Other (comments)    Pcn [Penicillins] Hives    Pcn [Penicillins] Hives and Itching       Review of Systems   Constitutional: Negative for chills, fever and malaise/fatigue. Respiratory: Negative for cough and shortness of breath. Cardiovascular: Negative for chest pain, palpitations and leg swelling. Gastrointestinal: Positive for abdominal pain (lower abdominal cramping) and heartburn. Negative for diarrhea, nausea and vomiting. Genitourinary: Negative for dysuria, flank pain, frequency, hematuria and urgency. PE  BP (!) 139/90 (BP 1 Location: Left arm, BP Patient Position: Sitting, BP Cuff Size: Large adult)   Pulse 87   Temp 97.4 °F (36.3 °C) (Temporal)   Resp 16   Ht 5' 4\" (1.626 m)   Wt 221 lb 6.4 oz (100.4 kg)   SpO2 99%   BMI 38.00 kg/m²     Physical Exam  Vitals reviewed. Constitutional:       General: She is not in acute distress. Appearance: Normal appearance. HENT:      Head: Normocephalic and atraumatic. Cardiovascular:      Rate and Rhythm: Normal rate and regular rhythm. Heart sounds: S1 normal and S2 normal. No murmur heard. No friction rub. No gallop. No S3 or S4 sounds. Pulmonary:      Effort: Pulmonary effort is normal.      Breath sounds: Normal breath sounds.  No wheezing, rhonchi or rales.   Abdominal:      General: Bowel sounds are normal. There is no distension. Palpations: Abdomen is soft. Tenderness: There is no abdominal tenderness. There is no right CVA tenderness, left CVA tenderness, guarding or rebound. Musculoskeletal:      Right lower leg: No edema. Left lower leg: No edema. Skin:     General: Skin is warm and dry. Capillary Refill: Capillary refill takes less than 2 seconds. Neurological:      Mental Status: She is alert and oriented to person, place, and time. Assessment/Plan  1.  Abdominal cramping  UA negative   Urine pregnancy negative  Watch and wait since symptoms have resolved  FU if symptoms return

## 2021-11-18 DIAGNOSIS — I82.4Y2 ACUTE DEEP VEIN THROMBOSIS (DVT) OF PROXIMAL VEIN OF LEFT LOWER EXTREMITY (HCC): ICD-10-CM

## 2021-11-22 RX ORDER — RIVAROXABAN 20 MG/1
TABLET, FILM COATED ORAL
Qty: 30 TABLET | Refills: 1 | Status: SHIPPED | OUTPATIENT
Start: 2021-11-22 | End: 2022-02-02 | Stop reason: SDUPTHER

## 2021-12-07 ENCOUNTER — NURSE TRIAGE (OUTPATIENT)
Dept: OTHER | Facility: CLINIC | Age: 21
End: 2021-12-07

## 2021-12-07 NOTE — TELEPHONE ENCOUNTER
Received call from  at [facility] with Red Flag Complaint. Brief description of triage: Connor Roman states that she feels like her heart is racing. She thought it was her Zoloft and did not take todays dose. Found out that she is also taking a prednisone taper. Detailed information of triage listed below. Triage indicates for patient to f/u with provider that has prescribed her Zoloft. She is also requesting an appt with PCP    Care advice provided, patient verbalizes understanding; denies any other questions or concerns; instructed to call back for any new or worsening symptoms. Writer provided warm transfer to [place] at [facility] for appointment scheduling. Attention Provider: Thank you for allowing me to participate in the care of your patient. The patient was connected to triage in response to information provided to the ECC. Please do not respond through this encounter as the response is not directed to a shared pool. Reason for Disposition   Palpitations    Answer Assessment - Initial Assessment Questions  1. DESCRIPTION: \"Please describe your heart rate or heart beat that you are having\" (e.g., fast/slow, regular/irregular, skipped or extra beats, \"palpitations\")      Jittery in the head    2. ONSET: \"When did it start? \" (Minutes, hours or days)       4 days    3. DURATION: \"How long does it last\" (e.g., seconds, minutes, hours)      Today it has been all day    4. PATTERN \"Does it come and go, or has it been constant since it started? \"  \"Does it get worse with exertion? \"   \"Are you feeling it now? \"      Today seems to be worse    5. TAP: \"Using your hand, can you tap out what you are feeling on a chair or table in front of you, so that I can hear? \" (Note: not all patients can do this)        NA    6. HEART RATE: \"Can you tell me your heart rate? \" \"How many beats in 15 seconds? \"  (Note: not all patients can do this)        Using BP cuff  Pulse 80    7.  RECURRENT SYMPTOM: \"Have you ever had this before? \" If so, ask: \"When was the last time? \" and \"What happened that time? \"       No    8. CAUSE: \"What do you think is causing the palpitations? \"      Zoloft    9. CARDIAC HISTORY: \"Do you have any history of heart disease? \" (e.g., heart attack, angina, bypass surgery, angioplasty, arrhythmia)       None    10. OTHER SYMPTOMS: \"Do you have any other symptoms? \" (e.g., dizziness, chest pain, sweating, difficulty breathing)        Chest pain yesterday    11. PREGNANCY: \"Is there any chance you are pregnant? \" \"When was your last menstrual period? \"        NO    Allergic rxn and placed on prednisone 5-6 days ago  Has Lupus    Protocols used: HEART RATE AND HEARTBEAT QUESTIONS-ADULT-[place]

## 2021-12-20 ENCOUNTER — OFFICE VISIT (OUTPATIENT)
Dept: FAMILY MEDICINE CLINIC | Age: 21
End: 2021-12-20

## 2021-12-20 VITALS
WEIGHT: 223.4 LBS | BODY MASS INDEX: 38.14 KG/M2 | DIASTOLIC BLOOD PRESSURE: 85 MMHG | HEART RATE: 74 BPM | SYSTOLIC BLOOD PRESSURE: 121 MMHG | HEIGHT: 64 IN | RESPIRATION RATE: 16 BRPM | OXYGEN SATURATION: 96 % | TEMPERATURE: 97.6 F

## 2021-12-20 DIAGNOSIS — G56.01 CARPAL TUNNEL SYNDROME OF RIGHT WRIST: Primary | ICD-10-CM

## 2021-12-20 DIAGNOSIS — Z23 NEEDS FLU SHOT: ICD-10-CM

## 2021-12-20 PROCEDURE — 99213 OFFICE O/P EST LOW 20 MIN: CPT | Performed by: INTERNAL MEDICINE

## 2021-12-20 PROCEDURE — 90686 IIV4 VACC NO PRSV 0.5 ML IM: CPT | Performed by: INTERNAL MEDICINE

## 2021-12-20 NOTE — PATIENT INSTRUCTIONS
Carpal Tunnel Syndrome: Care Instructions  Overview     Carpal tunnel syndrome is numbness, tingling, weakness, and pain in your hand, wrist, and sometimes forearm. It is caused by pressure on the median nerve. This nerve and several tough tissues called tendons run through a space in the wrist. This space is called the carpal tunnel. The repeated hand motions used in work and some hobbies and sports can put pressure on the median nerve. Pregnancy can cause carpal tunnel syndrome. Several conditions, such as diabetes, arthritis, and an underactive thyroid, can also cause it. You may be able to limit an activity or change the way you do it to reduce your symptoms. You also can take other steps to feel better. If your symptoms are mild, 1 to 2 weeks of home treatment are likely to ease your pain. Surgery is needed only if other treatments do not work. Follow-up care is a key part of your treatment and safety. Be sure to make and go to all appointments, and call your doctor if you are having problems. It's also a good idea to know your test results and keep a list of the medicines you take. How can you care for yourself at home? · If possible, stop or reduce the activity that causes your symptoms. If you cannot stop the activity, take frequent breaks to rest and stretch or change hand positions to do a task. Try switching hands, such as when using a computer mouse. · Try to avoid bending or twisting your wrists. · Ask your doctor if you can take an over-the-counter pain medicine, such as acetaminophen (Tylenol), ibuprofen (Advil, Motrin), or naproxen (Aleve). Be safe with medicines. Read and follow all instructions on the label. · If your doctor prescribes corticosteroid medicine to help reduce pain and swelling, take it exactly as prescribed. Call your doctor if you think you are having a problem with your medicine. · Put ice or a cold pack on your wrist for 10 to 20 minutes at a time to ease pain.  Put a thin cloth between the ice and your skin. · If your doctor or your physical or occupational therapist tells you to wear a wrist splint, wear it as directed to keep your wrist in a neutral position. This also eases pressure on your median nerve. · Ask your doctor whether you should have physical or occupational therapy to learn how to do tasks differently. · Try a yoga class to stretch your muscles and build strength in your hands and wrists. Yoga has been shown to ease carpal tunnel symptoms. To prevent carpal tunnel  · When working at a computer, keep your hands and wrists in line with your forearms. Hold your elbows close to your sides. Take a break every 10 to 15 minutes. · Try these exercises:  ? Warm up: Rotate your wrist up, down, and from side to side. Repeat this 4 times. Stretch your fingers far apart, relax them, then stretch them again. Repeat 4 times. Stretch your thumb by pulling it back gently, holding it, and then releasing it. Repeat 4 times. ? Prayer stretch: Start with your palms together in front of your chest just below your chin. Slowly lower your hands toward your waistline while keeping your hands close to your stomach and your palms together until you feel a mild to moderate stretch under your forearms. Hold for 10 to 20 seconds. Repeat 4 times. ? Wrist flexor stretch: Hold your arm in front of you with your palm up. Bend your wrist, pointing your hand toward the floor. With your other hand, gently bend your wrist further until you feel a mild to moderate stretch in your forearm. Hold for 10 to 20 seconds. Repeat 4 times. ? Wrist extensor stretch: Repeat the steps for the wrist flexor stretch, but begin with your extended hand palm down. · Squeeze a rubber exercise ball several times a day to keep your hands and fingers strong. · Avoid holding objects (such as a book) in one position for a long time. When possible, use your whole hand to grasp an object.  Using just the thumb and index finger can put stress on the wrist.  · Do not smoke. It can make this condition worse by reducing blood flow to the median nerve. If you need help quitting, talk to your doctor about stop-smoking programs and medicines. These can increase your chances of quitting for good. When should you call for help? Watch closely for changes in your health, and be sure to contact your doctor if:    · Your pain or other problems do not get better with home care.     · You want more information about physical or occupational therapy.     · You have side effects of your corticosteroid medicine, such as:  ? Weight gain. ? Mood changes. ? Trouble sleeping. ? Bruising easily.     · You have any other problems with your medicine. Where can you learn more? Go to http://www.gray.com/  Enter R432 in the search box to learn more about \"Carpal Tunnel Syndrome: Care Instructions. \"  Current as of: July 1, 2021               Content Version: 13.0  © 0714-7991 [employer]. Care instructions adapted under license by [employer] (which disclaims liability or warranty for this information). If you have questions about a medical condition or this instruction, always ask your healthcare professional. [place] [address] any warranty or liability for your use of this information.

## 2021-12-20 NOTE — PROGRESS NOTES
Oumou Chen is a 19 y.o. female (: 2000) presenting to address:    Chief Complaint   Patient presents with    Medication Evaluation       Vitals:    21 0816   BP: 121/85   Pulse: 74   Resp: 16   Temp: 97.6 °F (36.4 °C)   TempSrc: Temporal   SpO2: 96%   Weight: 223 lb 6.4 oz (101.3 kg)   Height: 5' 4\" (1.626 m)       Hearing/Vision:   No exam data present    Learning Assessment:     Learning Assessment 2021   PRIMARY LEARNER Patient   HIGHEST LEVEL OF EDUCATION - PRIMARY LEARNER  -   BARRIERS PRIMARY LEARNER -   CO-LEARNER CAREGIVER -   PRIMARY LANGUAGE ENGLISH   LEARNER PREFERENCE PRIMARY DEMONSTRATION     READING   ANSWERED BY patient    RELATIONSHIP SELF     Depression Screening:     3 most recent PHQ Screens 2021   Little interest or pleasure in doing things Not at all   Feeling down, depressed, irritable, or hopeless Not at all   Total Score PHQ 2 0     Fall Risk Assessment:   No flowsheet data found. Abuse Screening:     Abuse Screening Questionnaire 2021   Do you ever feel afraid of your partner? N   Are you in a relationship with someone who physically or mentally threatens you? N   Is it safe for you to go home? Y     ADL Assessment:   No flowsheet data found. Coordination of Care Questionaire:   1. \"Have you been to the ER, urgent care clinic since your last visit? Hospitalized since your last visit? \" Yes Where: [facility]    2. \"Have you seen or consulted any other health care providers outside of the [address] [place] [address] since your last visit? \" No     3. For patients aged 39-70: Has the patient had a colonoscopy? No     If the patient is female:    4. For patients aged 41-77: Has the patient had a mammogram within the past 2 years? No    5. For patients aged 21-65: Has the patient had a pap smear? Yes, HM satisfied with blue hyperlink    Advanced Directive:   1. Do you have an Advanced Directive? NO    2.  Would you like information on Advanced Directives?  NO

## 2021-12-20 NOTE — PROGRESS NOTES
HISTORY OF PRESENT ILLNESS  Ladi Marinelli is a 19 y.o. female. HPI  C/o right hand tingling/numbness, and occasional wrist pain, worse in am when she wake up, on/off during the day, no weakness. Review of Systems   Constitutional: Negative for fever. Neurological: Negative for focal weakness. Physical Exam  Vitals reviewed. Musculoskeletal:      Right wrist: No swelling or tenderness. Normal range of motion. Right hand: No swelling or tenderness. Normal range of motion. Normal sensation. ASSESSMENT and PLAN  Diagnoses and all orders for this visit:    1. Carpal tunnel syndrome of right wrist, most likely  -     REFERRAL TO ORTHOPEDICS  -     AMB SUPPLY ORDER, wrist splint, use during the night.     2. Needs flu shot  -     INFLUENZA VIRUS VAC QUAD,SPLIT,PRESV FREE SYRINGE IM

## 2021-12-21 ENCOUNTER — TELEPHONE (OUTPATIENT)
Dept: FAMILY MEDICINE CLINIC | Age: 21
End: 2021-12-21

## 2021-12-21 NOTE — TELEPHONE ENCOUNTER
Patient referral will be sent to [place], patient made aware that they booking out to possibly March and she's fine with that.

## 2021-12-21 NOTE — TELEPHONE ENCOUNTER
----- Message from Nicole Álvarez sent at 12/21/2021  3:45 PM EST -----  Subject: Referral Request    QUESTIONS   Reason for referral request? Patient calling to request a new orthopedic   referral states [address] did not have appts available until June and   she would like start sooner than that. Has the physician seen you for this condition before? No   Preferred Specialist (if applicable)? Do you already have an appointment scheduled? Additional Information for Provider? Please call patient once order is   placed.   ---------------------------------------------------------------------------  --------------  CALL BACK INFO  What is the best way for the office to contact you? OK to leave message on   voicemail  Preferred Call Back Phone Number?  [phone]

## 2022-01-10 ENCOUNTER — TELEPHONE (OUTPATIENT)
Dept: FAMILY MEDICINE CLINIC | Age: 22
End: 2022-01-10

## 2022-01-10 NOTE — TELEPHONE ENCOUNTER
----- Message from Jeff Driscoll sent at 1/8/2022 10:12 AM EST -----  Subject: Message to Provider    QUESTIONS  Information for Provider? Pt is calling to inform NP Mychal that she has   contacted COVID. She is experiencing chest pain, chills, sore throat, and   ear pain.   ---------------------------------------------------------------------------  --------------  CALL BACK INFO  What is the best way for the office to contact you? OK to leave message on   voicemail,Do not leave any message, patient will call back for answer  Preferred Call Back Phone Number? [phone]  ---------------------------------------------------------------------------  --------------  SCRIPT ANSWERS  Relationship to Patient?  Self

## 2022-02-02 DIAGNOSIS — I82.4Y2 ACUTE DEEP VEIN THROMBOSIS (DVT) OF PROXIMAL VEIN OF LEFT LOWER EXTREMITY (HCC): ICD-10-CM

## 2022-02-02 NOTE — TELEPHONE ENCOUNTER
Last seen in office 12/20/21    Last filled 11/22/21 qty 30 w/ 1 refill     Future Appointments   Date Time Provider Timur Mackey   4/21/2022  2:00 PM Robyn Solares DO [facility]

## 2022-02-02 NOTE — TELEPHONE ENCOUNTER
Requested Prescriptions     Pending Prescriptions Disp Refills    rivaroxaban (Xarelto) 20 mg tab tablet 30 Tablet 1     Sig: Take 1 Tablet by mouth daily. Pharmacy called on behalf of patient to request medication refill. She is completely out of medication and is supposed to be taken daily. Please advise.     Future Appointments   Date Time Provider Timur Mackey   4/21/2022  2:00 PM Robyn Solares DO [facility]     Last seen by vicente on 12/20/21

## 2022-02-14 ENCOUNTER — VIRTUAL VISIT (OUTPATIENT)
Dept: FAMILY MEDICINE CLINIC | Age: 22
End: 2022-02-14
Payer: MEDICAID

## 2022-02-14 DIAGNOSIS — M32.9 SYSTEMIC LUPUS ERYTHEMATOSUS, UNSPECIFIED SLE TYPE, UNSPECIFIED ORGAN INVOLVEMENT STATUS (HCC): ICD-10-CM

## 2022-02-14 DIAGNOSIS — J01.01 ACUTE RECURRENT MAXILLARY SINUSITIS: Primary | ICD-10-CM

## 2022-02-14 DIAGNOSIS — J45.40 MODERATE PERSISTENT ASTHMA WITHOUT COMPLICATION: ICD-10-CM

## 2022-02-14 DIAGNOSIS — U07.1 COVID-19 VIRUS INFECTION: ICD-10-CM

## 2022-02-14 DIAGNOSIS — R09.82 ALLERGIC RHINITIS WITH POSTNASAL DRIP: ICD-10-CM

## 2022-02-14 DIAGNOSIS — I82.4Y2 ACUTE DEEP VEIN THROMBOSIS (DVT) OF PROXIMAL VEIN OF LEFT LOWER EXTREMITY (HCC): ICD-10-CM

## 2022-02-14 DIAGNOSIS — J30.9 ALLERGIC RHINITIS WITH POSTNASAL DRIP: ICD-10-CM

## 2022-02-14 DIAGNOSIS — Z79.01 ANTICOAGULATED: ICD-10-CM

## 2022-02-14 PROCEDURE — 99214 OFFICE O/P EST MOD 30 MIN: CPT | Performed by: STUDENT IN AN ORGANIZED HEALTH CARE EDUCATION/TRAINING PROGRAM

## 2022-02-14 RX ORDER — AMOXICILLIN AND CLAVULANATE POTASSIUM 875; 125 MG/1; MG/1
1 TABLET, FILM COATED ORAL EVERY 12 HOURS
Qty: 10 TABLET | Refills: 0 | Status: CANCELLED | OUTPATIENT
Start: 2022-02-14 | End: 2022-02-19

## 2022-02-14 RX ORDER — PIMECROLIMUS 10 MG/G
CREAM TOPICAL
COMMUNITY
Start: 2022-01-03 | End: 2022-04-06 | Stop reason: ALTCHOICE

## 2022-02-14 RX ORDER — ALPRAZOLAM 0.25 MG/1
1 TABLET ORAL
COMMUNITY
Start: 2021-12-03 | End: 2022-03-28

## 2022-02-14 RX ORDER — DOXYCYCLINE 100 MG/1
100 CAPSULE ORAL 2 TIMES DAILY
Qty: 14 CAPSULE | Refills: 0 | Status: SHIPPED | OUTPATIENT
Start: 2022-02-14 | End: 2022-04-06 | Stop reason: ALTCHOICE

## 2022-02-14 NOTE — PROGRESS NOTES
Note to patient:  The purpose of this note is to communicate optimally with the other physicians / APCs involved in your care. It is written using standard medical terminology. If you have questions regarding the details of the note, please contact my office to schedule an appointment to address your questions. Beht Jang  Primary Care Office Visit - Telemedicine Problem-Oriented Note    : 2000   Hayley Sun is a 19 y.o. female presenting for  Chief Complaint   Patient presents with    Nasal Congestion     thick yellowish mucus w/ red strikes  x 1 week             Assessment/Plan:       ICD-10-CM ICD-9-CM   1. Acute recurrent maxillary sinusitis  J01.01 461.0   2. Systemic lupus erythematosus, unspecified SLE type, unspecified organ involvement status ([facility] [address]  M32.9 710.0   3. Allergic rhinitis with postnasal drip  J30.9 477.9    R09.82 784.91   4. COVID-19 virus infection  U07.1 079.89   5. Acute deep vein thrombosis (DVT) of proximal vein of left lower extremity (HCC)  I82.4Y2 453.41   6. Anticoagulated  Z79.01 V58.61   7. Moderate persistent asthma without complication  N46.52 687.78     #CoVid in January, most of month    #Acute recurrent maxillary sinusitis   With #allergic rhinitis - uncontrolled  Reviewed treatment recommendations for symptomatic relief. Encouraged to [employer] OTC agents for maintenance to reduce frequency of infections including daily antihistamine and daily nasal steroid and continue at least a week past resolution of current illness. ?#mod persistent asthma - stable, denies recent exacerbation  In review of medications, reports  using preventative , and  rescue inhaler. Reviewed optimal use of preventative and rescue inhalers. Reviewed to follow up if use of rescue inhaler (albuterol) increases to twice weekly or more.  Discussed if sx not improving or rescue treatment becoming less effective, may need to escalate care and seek in person evaluation. Key COPD Medications             Flovent HFA 44 mcg/actuation inhaler (Taking) Take 2 Puffs by inhalation two (2) times a day. albuterol (PROVENTIL HFA, VENTOLIN HFA, PROAIR HFA) 90 mcg/actuation inhaler (Taking) Take 2 Puffs by inhalation every four (4) hours as needed. #Acute DVT of LLE in setting of #Lupus   #Anticoagulated on Xarelto    Orders Placed This Encounter    ALPRAZolam (XANAX) 0.25 mg tablet     Sig: Take 1 Tablet by mouth daily as needed.  pimecrolimus (ELIDEL) 1 % topical cream    doxycycline (MONODOX) 100 mg capsule     Sig: Take 1 Capsule by mouth two (2) times a day. Dispense:  14 Capsule     Refill:  0     Follow-up and Dispositions    · Return for after resolution of illness for GILLIAN. Reviewed management plan & instructions with patient, who voiced understanding. Subjective   History:   Kathleen Tam is a 19 y.o. female presenting to address:  Chief Complaint   Patient presents with    Nasal Congestion     thick yellowish mucus w/ red strikes  x 1 week      Almost 2 weeks nasal congestion, runny nose - worsened last 5 days   Feels prominent mucous with yellow/red streaks  Sensation of mucous    Review of Systems:     A comprehensive review of systems was negative except for that written in the HPI and A/P         Objective     Physical Assessment:     Physical Exam  Nursing note reviewed. Constitutional:       General: She is not in acute distress. HENT:      Nose: Congestion and rhinorrhea present. Pulmonary:      Effort: Pulmonary effort is normal. No respiratory distress. Neurological:      Mental Status: She is alert and oriented to person, place, and time.    Psychiatric:         Mood and Affect: Mood normal.         Behavior: Behavior normal.              Ladi Gil, who was evaluated through a synchronous (real-time) audio-video encounter, and/or her healthcare decision maker, is aware that it is a billable service, with coverage as determined by her insurance carrier. She provided verbal consent to proceed: Yes, and patient identification was verified. This visit was conducted pursuant to the emergency declaration under the [address] [place] [address] authority and the [employer] Act. A caregiver was present when appropriate. Ability to conduct physical exam was limited. The patient was located in a state where the provider was credentialed to provide care.      Saida Dimas DO  Family Medicine  02/14/2022

## 2022-02-14 NOTE — PROGRESS NOTES
For virtual visit patient would like to receive link via TEXT: [phone]    Elisabeth Baer is a 19 y.o. female (: 2000) evaluated via telephone on 2022 to address:    Chief Complaint   Patient presents with    Nasal Congestion     thick yellowish mucus w/ red strikes  x 1 week        No flowsheet data found. Allergies Reviewed. Learning Assessment:     Learning Assessment 2021   PRIMARY LEARNER Patient   HIGHEST LEVEL OF EDUCATION - PRIMARY LEARNER  -   BARRIERS PRIMARY LEARNER -   CO-LEARNER CAREGIVER -   PRIMARY LANGUAGE ENGLISH   LEARNER PREFERENCE PRIMARY DEMONSTRATION     READING   ANSWERED BY patient    RELATIONSHIP SELF     Depression Screening:     3 most recent PHQ Screens 2022   Little interest or pleasure in doing things Not at all   Feeling down, depressed, irritable, or hopeless Not at all   Total Score PHQ 2 0   Trouble falling or staying asleep, or sleeping too much Not at all   Feeling tired or having little energy Not at all   Poor appetite, weight loss, or overeating Not at all   Feeling bad about yourself - or that you are a failure or have let yourself or your family down Not at all   Trouble concentrating on things such as school, work, reading, or watching TV Not at all   Moving or speaking so slowly that other people could have noticed; or the opposite being so fidgety that others notice Not at all   Thoughts of being better off dead, or hurting yourself in some way Not at all   PHQ 9 Score 0   How difficult have these problems made it for you to do your work, take care of your home and get along with others Not difficult at all     Fall Risk Assessment:     Fall Risk Assessment, last 12 mths 2022   Able to walk? Yes   Fall in past 12 months? 0   Do you feel unsteady? 0   Are you worried about falling 0     Abuse Screening:     Abuse Screening Questionnaire 2022   Do you ever feel afraid of your partner?  N   Are you in a relationship with someone who physically or mentally threatens you? N   Is it safe for you to go home? Y     ADL Assessment:   No flowsheet data found. Coordination of Care Questionaire:   1. Have you been to the ER, urgent care clinic since your last visit? Hospitalized since your last visit? YES 2/12/22 [facility]     2. Have you seen or consulted any other health care providers outside of the [address] [place] [address] since your last visit? Include any pap smears or colon screening. YES Dermatology     Advanced Directive:   1. Do you have an Advanced Directive? NO    2. Would you like information on Advanced Directives?  NO

## 2022-02-22 ENCOUNTER — TELEPHONE (OUTPATIENT)
Dept: FAMILY MEDICINE CLINIC | Age: 22
End: 2022-02-22

## 2022-02-22 NOTE — TELEPHONE ENCOUNTER
Patient called wanting medication advice. She stated that she was told to stop taking plaquenil 200mg as well as mycophenolate while she was on antibiotics. Now that she is done with antibiotics does she start taking these medications again? Patient was on the phone but when I returned it was silent so I disconnected. Please advise.      Garthbubba Asher called from [phone]    Future Appointments   Date Time Provider Timur Mackey   3/1/2022  9:00 AM Robyn Solares DO [facility]   4/21/2022  2:00 PM Robyn Solares DO [facility]     Last seen 02/14/22 virtual

## 2022-02-23 NOTE — TELEPHONE ENCOUNTER
Called patient to inform however VM full, will also send "[employer] message informing.

## 2022-02-28 NOTE — PROGRESS NOTES
Note to patient:  The purpose of this note is to communicate optimally with the other physicians / APCs involved in your care. It is written using standard medical terminology. If you have questions regarding the details of the note, please contact my office to schedule an appointment to address your questions. Beth Jang  Primary Care Office Visit - Problem-Oriented    : 2000   Shira Macias is a 19 y.o. female presenting for  Chief Complaint   Patient presents with    Knee Pain            Assessment/Plan:       ICD-10-CM ICD-9-CM   1. COVID-19 long hauler manifesting chronic cough  R05.3 786.2    U09.9 139.8   2. Systemic lupus erythematosus, unspecified SLE type, unspecified organ involvement status ([facility] [address]  M32.9 710.0   3. Acute deep vein thrombosis (DVT) of proximal vein of left lower extremity (HCC)  I82.4Y2 453.41   4. Anticoagulated  Z79.01 V58.61   5. COVID-19 virus infection  U07.1 079.89   6. Moderate persistent asthma with acute exacerbation  J45.41 493.92   7. Carpal tunnel syndrome of right wrist  G56.01 354.0   8. Anxiety  F41.9 300.00   9. Numbness and tingling in right hand  R20.0 782.0    R20.2      #Chronic productive cough 2/2 recent #CoVid in January, most of month with sx vs exacerbation of ? #mod persistent asthma -  Has not been using Albuterol and Flovent, has had difficulty with spacer. Reviewed instructions and optimal use to improve symptoms. Given difficulty with inhalers, encouraged to find nebulizer machine to utilize nebulizer treatments when having acute illness. Discussed if sx not improving or rescue treatment becoming less effective, may need to escalate care and start PO steroid course as prescribed. Discussed conservative treatment of chest percussion to help break up mucous.      Key COPD Medications             predniSONE (DELTASONE) 20 mg tablet (Taking) Take by mouth daily (with breakfast) for 5 days.  Take 2 tablets on day 1 and then take 1 tablet daily for 4 days    albuterol (PROVENTIL VENTOLIN) 2.5 mg /3 mL (0.083 %) nebu (Taking) 1.5 mL by Nebulization route every four (4) hours as needed for Wheezing, Shortness of Breath or Respiratory Distress. Flovent HFA 44 mcg/actuation inhaler (Taking) Take 2 Puffs by inhalation two (2) times a day. albuterol (PROVENTIL HFA, VENTOLIN HFA, PROAIR HFA) 90 mcg/actuation inhaler (Taking) Take 2 Puffs by inhalation every four (4) hours as needed. #Anxiety - uncontrolled  Reviewed risk/benefit of both preventative daily treatment and rescue (prn) medication. Reviewed prior med trials. Fearful of daily preventative medication. Felt \"zombie\" like with Zoloft. Reviewed that it could take 4-6wks before improvement noted after initiation of preventative daily medication and likely need to increase dose if tolerating well to reach therapeutic level/treatment goals. Counseled on nonpharmacologic interventions that could improve outcomes as well including exercise, therapy/counseling, and self care. Agreeable to consider initiating low dose Lexapro with close follow up. Current rescue treatment use: Using Xanax prn less than twice weekly (usually 1/2 tablet with good effect)    #Right hand nunbness tingling suspect #carpal tunnel   Hx fracture in childhood   Reviewed carpal tunnel conservative treatment/management. Provided instructions for initial treatment. #Acute DVT of LLE in setting of #Lupus   #Anticoagulated on Xarelto  Established with Rheumatology for Lupus monitoring      #HLD - goal <100  Reviewed Statin hx, unclear if needed but has not been using regularly. Interested to stop using given Patient's goal to reduce pill burden and continue with monitoring and focusing on management with heart healthy habits.      Lab Results   Component Value Date/Time    LDL, calculated 106 (H) 07/09/2021 11:24 AM     #Vit D Def - reviewed dx and recommended supplement dosing    #HM  Reviewed current vaccination recommendations and would offer additional information if interested. Cervical CA screening - Up to date    Orders Placed This Encounter    Nebulizer & Compressor machine     Si Each by Does Not Apply route once for 1 dose. Dispense:  1 Each     Refill:  0    predniSONE (DELTASONE) 20 mg tablet     Sig: Take by mouth daily (with breakfast) for 5 days. Take 2 tablets on day 1 and then take 1 tablet daily for 4 days     Dispense:  6 Tablet     Refill:  0    albuterol (PROVENTIL VENTOLIN) 2.5 mg /3 mL (0.083 %) nebu     Si.5 mL by Nebulization route every four (4) hours as needed for Wheezing, Shortness of Breath or Respiratory Distress. Dispense:  30 Nebule     Refill:  5       Follow-up and Dispositions    · Return in about 4 months (around 2022) for follow up on chronic conditions . Reviewed management plan & instructions with patient, who voiced understanding. Subjective   History:   Martinez Sen is a 19 y.o. female presenting to address:  Chief Complaint   Patient presents with    Knee Pain     Extensive review of medical history and medications completed to facilitate transfer of care. Review of Systems:     A comprehensive review of systems was negative except for that written in the HPI and A/P         Objective     Physical Assessment:     Visit Vitals  /68 (BP 1 Location: Left upper arm, BP Patient Position: Sitting, BP Cuff Size: Large adult)   Pulse 83   Temp 97.2 °F (36.2 °C) (Temporal)   Resp 15   Ht 5' 4\" (1.626 m)   Wt 227 lb 12.8 oz (103.3 kg)   LMP 2022 (Exact Date)   SpO2 98%   BMI 39.10 kg/m²       Physical Exam  Vitals and nursing note reviewed. Constitutional:       General: She is not in acute distress. Cardiovascular:      Rate and Rhythm: Normal rate and regular rhythm. Pulses: Normal pulses. Heart sounds: Normal heart sounds. Pulmonary:      Effort: Pulmonary effort is normal. No respiratory distress. Breath sounds: Normal breath sounds. Musculoskeletal:      Right lower leg: No edema. Left lower leg: No edema. Neurological:      Mental Status: She is alert and oriented to person, place, and time. Gait: Gait normal.   Psychiatric:         Mood and Affect: Mood is anxious. Speech: Speech normal.         Behavior: Behavior normal.                 This document may have been created with the aid of dictation software. Text may contain errors, particularly phonetic errors.       Bro Marsh DO  Family Medicine  03/01/2022

## 2022-03-01 ENCOUNTER — OFFICE VISIT (OUTPATIENT)
Dept: FAMILY MEDICINE CLINIC | Age: 22
End: 2022-03-01
Payer: MEDICAID

## 2022-03-01 VITALS
SYSTOLIC BLOOD PRESSURE: 110 MMHG | HEART RATE: 83 BPM | OXYGEN SATURATION: 98 % | BODY MASS INDEX: 38.89 KG/M2 | WEIGHT: 227.8 LBS | TEMPERATURE: 97.2 F | DIASTOLIC BLOOD PRESSURE: 68 MMHG | HEIGHT: 64 IN | RESPIRATION RATE: 15 BRPM

## 2022-03-01 DIAGNOSIS — J45.41 MODERATE PERSISTENT ASTHMA WITH ACUTE EXACERBATION: ICD-10-CM

## 2022-03-01 DIAGNOSIS — Z76.89 ESTABLISHING CARE WITH NEW DOCTOR, ENCOUNTER FOR: Primary | ICD-10-CM

## 2022-03-01 DIAGNOSIS — R05.3 COVID-19 LONG HAULER MANIFESTING CHRONIC COUGH: ICD-10-CM

## 2022-03-01 DIAGNOSIS — U07.1 COVID-19 VIRUS INFECTION: ICD-10-CM

## 2022-03-01 DIAGNOSIS — F41.9 ANXIETY: ICD-10-CM

## 2022-03-01 DIAGNOSIS — R20.2 NUMBNESS AND TINGLING IN RIGHT HAND: ICD-10-CM

## 2022-03-01 DIAGNOSIS — U09.9 COVID-19 LONG HAULER MANIFESTING CHRONIC COUGH: ICD-10-CM

## 2022-03-01 DIAGNOSIS — R20.0 NUMBNESS AND TINGLING IN RIGHT HAND: ICD-10-CM

## 2022-03-01 DIAGNOSIS — I82.4Y2 ACUTE DEEP VEIN THROMBOSIS (DVT) OF PROXIMAL VEIN OF LEFT LOWER EXTREMITY (HCC): ICD-10-CM

## 2022-03-01 DIAGNOSIS — Z79.01 ANTICOAGULATED: ICD-10-CM

## 2022-03-01 DIAGNOSIS — M32.9 SYSTEMIC LUPUS ERYTHEMATOSUS, UNSPECIFIED SLE TYPE, UNSPECIFIED ORGAN INVOLVEMENT STATUS (HCC): ICD-10-CM

## 2022-03-01 DIAGNOSIS — G56.01 CARPAL TUNNEL SYNDROME OF RIGHT WRIST: ICD-10-CM

## 2022-03-01 PROCEDURE — 99214 OFFICE O/P EST MOD 30 MIN: CPT | Performed by: STUDENT IN AN ORGANIZED HEALTH CARE EDUCATION/TRAINING PROGRAM

## 2022-03-01 RX ORDER — PREDNISONE 20 MG/1
TABLET ORAL
Qty: 6 TABLET | Refills: 0 | Status: SHIPPED | OUTPATIENT
Start: 2022-03-01 | End: 2022-04-06 | Stop reason: ALTCHOICE

## 2022-03-01 RX ORDER — NEBULIZER AND COMPRESSOR
1 EACH MISCELLANEOUS ONCE
Qty: 1 EACH | Refills: 0 | Status: SHIPPED | OUTPATIENT
Start: 2022-03-01 | End: 2022-03-01

## 2022-03-01 RX ORDER — ALBUTEROL SULFATE 0.83 MG/ML
1.25 SOLUTION RESPIRATORY (INHALATION)
Qty: 30 NEBULE | Refills: 5 | Status: SHIPPED | OUTPATIENT
Start: 2022-03-01

## 2022-03-01 NOTE — PROGRESS NOTES
Elisabeth Baer is a 19 y.o. female (: 2000) presenting to address:    Chief Complaint   Patient presents with    Knee Pain       Vitals:    22 0712   BP: 110/68   Pulse: 83   Resp: 15   Temp: 97.2 °F (36.2 °C)   TempSrc: Temporal   SpO2: 98%   Weight: 227 lb 12.8 oz (103.3 kg)   Height: 5' 4\" (1.626 m)   PainSc:   0 - No pain   LMP: 2022       Hearing/Vision:   No exam data present    Learning Assessment:     Learning Assessment 2021   PRIMARY LEARNER Patient   HIGHEST LEVEL OF EDUCATION - PRIMARY LEARNER  -   BARRIERS PRIMARY LEARNER -   CO-LEARNER CAREGIVER -   PRIMARY LANGUAGE ENGLISH   LEARNER PREFERENCE PRIMARY DEMONSTRATION     READING   ANSWERED BY patient    RELATIONSHIP SELF     Depression Screening:     3 most recent PHQ Screens 3/1/2022   Little interest or pleasure in doing things Not at all   Feeling down, depressed, irritable, or hopeless Not at all   Total Score PHQ 2 0   Trouble falling or staying asleep, or sleeping too much -   Feeling tired or having little energy -   Poor appetite, weight loss, or overeating -   Feeling bad about yourself - or that you are a failure or have let yourself or your family down -   Trouble concentrating on things such as school, work, reading, or watching TV -   Moving or speaking so slowly that other people could have noticed; or the opposite being so fidgety that others notice -   Thoughts of being better off dead, or hurting yourself in some way -   PHQ 9 Score -   How difficult have these problems made it for you to do your work, take care of your home and get along with others -     Fall Risk Assessment:     Fall Risk Assessment, last 12 mths 2022   Able to walk? Yes   Fall in past 12 months? 0   Do you feel unsteady? 0   Are you worried about falling 0     Abuse Screening:     Abuse Screening Questionnaire 2022   Do you ever feel afraid of your partner?  N   Are you in a relationship with someone who physically or mentally threatens you? N   Is it safe for you to go home? Y     ADL Assessment:   No flowsheet data found. Coordination of Care Questionaire:   1. \"Have you been to the ER, urgent care clinic since your last visit? Hospitalized since your last visit? \" No    2. \"Have you seen or consulted any other health care providers outside of the [address] [place] [address] since your last visit? \" No     3. For patients aged 39-70: Has the patient had a colonoscopy / FIT/ Cologuard? NA - based on age    If the patient is female:    4. For patients aged 41-77: Has the patient had a mammogram within the past 2 years? NA - based on age or sex  See top three    5. For patients aged 21-65: Has the patient had a pap smear? Yes - no Care Gap present    Advanced Directive:   1. Do you have an Advanced Directive? NO    2. Would you like information on Advanced Directives?  NO

## 2022-03-01 NOTE — PATIENT INSTRUCTIONS
 Can purchase Aspercreme or Volteran gel 1% (also known as Diclofenac) at the pharmacy without a prescription. It is the same kind of medication as Ibuprofen (NSAID) which works well for inflammation without the same side effects that come with oral version of the medication. It comes in a tube, usually 100g and can be used as needed for muscular or joint pain. Many people with depression and anxiety can find relief with adding some of the following methods to their treatment plan. These methods have all been scientifically proven to help ease symptoms. Here's what the research recommends:    EXERCISE  One of the most-studied natural approaches to treating depression, regular physical activity may lift mood in part by increasing certain neurotransmitters. Of course, embracing an exercise habit isn't easy for most people--especially those with depression. \"In my experience, the last thing depressed people want to do is move,\" says Dr. Elayne Dinh,  of the [facility]. \"But it has a striking effect. \"  Plus, it's free. COGNITIVE-BEHAVIORAL THERAPY  CBT, a type of talk therapy, focuses on changing negative thought patterns, and then learning how to home in on specific problems and find new ways to approach them. It typically lasts for 10 to 20 sessions. Some studies have shown it to be as effective as medication. [address] [place] [address] with depression often withdraw from the world, and this therapy seeks to bring them back in. Treatment involves helping people identify activities that add meaning to their life, like reading, volunteering or hanging out with friends, and encourages them to do these things without waiting for their mood to lift first. In a recent study published in the Lancet, this kind of therapy was shown to be as effective as CBT. And it costs much less, because practitioners don't need as much training.     MINDFULNESS TRAINING  \"Thoughts and images are often the source of sadness and fear, and if you have no training in getting your attention away from them, you're helpless,\" says Pearl Madison. One such program, an eight-week small-group treatment called mindfulness-based cognitive therapy, trains people to be aware of the present moment through mindfulness practices like gentle yoga and daily meditation. It was shown in a 2016 study in [facility] to help people with recurrent depression avoid relapses even better than antidepressants. Cognitive Behavioral Skills You'll Need to Beat Anxiety    Five essential skills for overcoming anxiety and getting on with a happy life. 1. The ability to tolerate uncertainty. Studies have shown that intolerance of uncertainty is a key factor in anxiety and depression. People who can't tolerate uncertainty often avoid situations, procrastinate, seek reassurance constantly, delay taking action, do excessive checking, and refuse to delegate. 2. The ability to recognize rumination. Rumination is when you're repeatedly bothered by a worry thought. When people ruminate, their problem solving capacity is reduced. If you're ruminating, it's often best to wait to attempt to problem solve until you can think about the issue without jumping straight into rumination mode. If you can learn to recognize when you're ruminating, you can use cognitive behavioral therapy techniques, defusion techniques, or mindfulness techniques to help you stop ruminating. The best thing you can do when you're ruminating is accept that you're having whatever thoughts you're having, recognize that the thoughts might not be accurate, and allow the thoughts to pass in their own time rather than trying to block them out. Trying to block out distressing thoughts will just cause increased intensity and intrusions of the thoughts you're trying not to have. 3. The ability to recognize thought distortions.     Types of thought distortions include: making excessively negative predictions, underestimating your ability to cope, personalizing, mind reading, catastrophizing, \"shoulds\" and \"musts,\" making judgments of yourself or others that are black and white rather than gray, entitlement thoughts (e.g., thinking that the normal rules shouldn't apply to you), and more. The key is recognizing thought distortions is to ask yourself what thoughts you're having when you feel distressed. Some of these thoughts are likely to be thought distortions. 4. The ability and willingness to use mindfulness techniques. Mindfulness techniques can help reduce anxiety and increase willpower. Practicing mindfulness will help you stop avoidance coping, make better choices even when you're feeling anxious, and help you ruminate less. Try this 10-minute mindful walking exercise to get started. 5. The ability to talk to yourself kindly about your imperfections and mistakes. Criticizing yourself harshly when you make a mistake or when one of your personal imperfections shows up is likely to lead to rumination and avoidance coping. Research has shown that talking to yourself kindly not only helps you feel betterit also increases your motivation for self-improvement.

## 2022-03-07 ENCOUNTER — TELEPHONE (OUTPATIENT)
Dept: FAMILY MEDICINE CLINIC | Age: 22
End: 2022-03-07

## 2022-03-07 NOTE — TELEPHONE ENCOUNTER
Patient calling to let Dr. Lauri Palacios know that her pharmacy doesn't have the nebulizer machine and wants to know where she can get it from. Please advise.   Patient would like a call back [phone]

## 2022-03-07 NOTE — TELEPHONE ENCOUNTER
Spoke to patient and informed her that she will need to contact her insurance and find out what DME company they are partnered with so we can send order to correct company.

## 2022-03-07 NOTE — TELEPHONE ENCOUNTER
Patient calling to let Dr. Solares know that her pharmacy doesn't have the nebulizer machine and wants to know where she can get it from. Please advise.  Patient would like a call back

## 2022-03-28 ENCOUNTER — OFFICE VISIT (OUTPATIENT)
Dept: FAMILY MEDICINE CLINIC | Age: 22
End: 2022-03-28
Payer: MEDICAID

## 2022-03-28 VITALS
BODY MASS INDEX: 38.33 KG/M2 | WEIGHT: 224.5 LBS | HEIGHT: 64 IN | SYSTOLIC BLOOD PRESSURE: 118 MMHG | DIASTOLIC BLOOD PRESSURE: 77 MMHG | TEMPERATURE: 97.5 F | HEART RATE: 77 BPM | RESPIRATION RATE: 16 BRPM | OXYGEN SATURATION: 98 %

## 2022-03-28 DIAGNOSIS — Z87.898 HISTORY OF SEIZURE: ICD-10-CM

## 2022-03-28 DIAGNOSIS — Z11.3 SCREEN FOR STD (SEXUALLY TRANSMITTED DISEASE): ICD-10-CM

## 2022-03-28 DIAGNOSIS — F41.8 ANXIETY ABOUT HEALTH: ICD-10-CM

## 2022-03-28 DIAGNOSIS — M32.19 LUPUS CEREBRITIS (HCC): ICD-10-CM

## 2022-03-28 DIAGNOSIS — I82.4Y2 ACUTE DEEP VEIN THROMBOSIS (DVT) OF PROXIMAL VEIN OF LEFT LOWER EXTREMITY (HCC): ICD-10-CM

## 2022-03-28 DIAGNOSIS — F41.9 SEVERE ANXIETY: ICD-10-CM

## 2022-03-28 DIAGNOSIS — Z79.01 ANTICOAGULATED: ICD-10-CM

## 2022-03-28 DIAGNOSIS — E55.9 VITAMIN D DEFICIENCY: ICD-10-CM

## 2022-03-28 DIAGNOSIS — F33.1 MDD (MAJOR DEPRESSIVE DISORDER), RECURRENT EPISODE, MODERATE (HCC): ICD-10-CM

## 2022-03-28 DIAGNOSIS — E78.5 DYSLIPIDEMIA, GOAL LDL BELOW 100: ICD-10-CM

## 2022-03-28 DIAGNOSIS — J45.30 MILD PERSISTENT ASTHMA WITHOUT COMPLICATION: ICD-10-CM

## 2022-03-28 DIAGNOSIS — F41.0 PANIC ATTACKS: ICD-10-CM

## 2022-03-28 DIAGNOSIS — M32.9 SYSTEMIC LUPUS ERYTHEMATOSUS, UNSPECIFIED SLE TYPE, UNSPECIFIED ORGAN INVOLVEMENT STATUS (HCC): Primary | ICD-10-CM

## 2022-03-28 DIAGNOSIS — R41.89 BRAIN FOG: ICD-10-CM

## 2022-03-28 PROCEDURE — 99214 OFFICE O/P EST MOD 30 MIN: CPT | Performed by: STUDENT IN AN ORGANIZED HEALTH CARE EDUCATION/TRAINING PROGRAM

## 2022-03-28 RX ORDER — LORAZEPAM 0.5 MG/1
0.5 TABLET ORAL
Qty: 30 TABLET | Refills: 0 | Status: SHIPPED | OUTPATIENT
Start: 2022-03-28

## 2022-03-28 NOTE — PATIENT INSTRUCTIONS
GERD vs Acid Reflux Overview     Gastroesophageal reflux, also called \"acid reflux,\" occurs when the stomach contents back up into the esophagus and/or mouth. Occasional reflux is normal and can happen in healthy infants, children, and adults, most often after eating a meal. Most episodes are brief and do not cause bothersome symptoms or complications. In contrast, people with gastroesophageal reflux disease (GERD) experience bothersome symptoms or damage to the esophagus as a result of acid reflux. The most common symptoms of GERD are:  ?Heartburn - This typically feels like a burning sensation in the center of the chest, which sometimes spreads to the throat. It most often happens after a meal.  ?Regurgitation - This is when stomach contents (acid mixed with bits of undigested food) flow back into your mouth or throat. Other symptoms of GERD may include:  ?Stomach pain (pain in the upper abdomen)  ? Chest pain   ? Difficulty swallowing (called dysphagia) or pain on swallowing (called odynophagia)  ? Persistent laryngitis/hoarseness (due to the acid irritating the vocal cords)  ? Persistent sore throat or cough  ? Sense of a lump in the throat  ? Chronic nocturnal cough  ? Nausea and/or vomiting    The following lifestyle changes are often recommended:  ?Raising the head of your bed six to eight inches - Although most people only have heartburn during the two- to three-hour period after meals, some wake up at night with heartburn. People with nighttime heartburn can elevate the head of their bed, which raises the head and shoulders higher than the stomach, allowing gravity to prevent acid from refluxing. You can raise the head of your bed by putting blocks of wood under the legs or a foam wedge under the mattress. Several companies have developed commercial products for this purpose.  However, it is not helpful to use additional pillows; this can cause an unnatural bend in the body that increases pressure on the stomach, which can worsen acid reflux. ? Avoiding foods that trigger symptoms - Some foods also cause relaxation of the lower esophageal sphincter, which can lead to acid reflux. Excessive caffeine, chocolate, alcohol, peppermint, and fatty foods may cause bothersome acid reflux in some people. If you notice that your symptoms are worse after you have certain foods or beverages, it's reasonable to limit or avoid these things. ?Quitting smoking - Saliva helps to neutralize refluxed acid, and smoking reduces the amount of saliva in the mouth and throat. Smoking also lowers the pressure in the lower esophageal sphincter and provokes coughing, causing frequent episodes of acid reflux in the esophagus. In addition to having many other health benefits, quitting smoking can reduce or eliminate symptoms of mild reflux. ? Losing weight (if you are overweight) - Losing weight may help people who are overweight to reduce acid reflux. ? Avoiding late meals - Lying down with a full stomach may increase the risk of acid reflux. By planning meals for at least two to three hours before bedtime, symptoms may be reduced. ?Wearing loose, comfortable clothing - At minimum, tight-fitting clothing can increase discomfort, but it may also increase pressure in the abdomen, forcing stomach contents into the esophagus. When to seek help -- The following signs and symptoms may indicate a more serious problem. Tell your health care provider right away if you:  ? Have difficulty or pain with swallowing (eg, feeling like food gets stuck in your throat)  ? Have no appetite or lose weight without trying  ? Have chest pain  ? Feel like you are choking  ? Have signs of bleeding in the gastrointestinal tract, such as blood in your vomit or dark-colored vomit that looks like coffee grounds or black stools  ? Have persistent vomiting  ? Have new stomach pain and are age 61 or older

## 2022-03-28 NOTE — PROGRESS NOTES
Aishwarya Rosas is a 19 y.o. female (: 2000) presenting to address:    Chief Complaint   Patient presents with   [address] Problem     Scheduled w/ Dr. Danielito Muñoz (Neurology) 10/12/22. Vitals:    22 1344   BP: 118/77   Pulse: 77   Resp: 16   Temp: 97.5 °F (36.4 °C)   TempSrc: Temporal   SpO2: 98%   Weight: 224 lb 8 oz (101.8 kg)   Height: 5' 4\" (1.626 m)   PainSc:   7   PainLoc: Abdomen   LMP: 2022       Hearing/Vision:   No exam data present    Learning Assessment:     Learning Assessment 2021   PRIMARY LEARNER Patient   HIGHEST LEVEL OF EDUCATION - PRIMARY LEARNER  -   BARRIERS PRIMARY LEARNER -   CO-LEARNER CAREGIVER -   PRIMARY LANGUAGE ENGLISH   LEARNER PREFERENCE PRIMARY DEMONSTRATION     READING   ANSWERED BY patient    RELATIONSHIP SELF     Depression Screening:     3 most recent PHQ Screens 3/28/2022   Little interest or pleasure in doing things Several days   Feeling down, depressed, irritable, or hopeless Nearly every day   Total Score PHQ 2 4   Trouble falling or staying asleep, or sleeping too much Nearly every day   Feeling tired or having little energy More than half the days   Poor appetite, weight loss, or overeating Several days   Feeling bad about yourself - or that you are a failure or have let yourself or your family down Several days   Trouble concentrating on things such as school, work, reading, or watching TV Several days   Moving or speaking so slowly that other people could have noticed; or the opposite being so fidgety that others notice More than half the days   Thoughts of being better off dead, or hurting yourself in some way Not at all   PHQ 9 Score 14   How difficult have these problems made it for you to do your work, take care of your home and get along with others Extremely difficult     Fall Risk Assessment:     Fall Risk Assessment, last 12 mths 3/28/2022   Able to walk? Yes   Fall in past 12 months? 0   Do you feel unsteady?  0   Are you worried about falling 0     Abuse Screening:     Abuse Screening Questionnaire 3/28/2022   Do you ever feel afraid of your partner? N   Are you in a relationship with someone who physically or mentally threatens you? N   Is it safe for you to go home? Y     ADL Assessment:   No flowsheet data found. Coordination of Care Questionaire:   1. \"Have you been to the ER, urgent care clinic since your last visit? Hospitalized since your last visit? \" No    2. \"Have you seen or consulted any other health care providers outside of the [address] [place] [address] since your last visit? \" Yes Dr. Marietta Esposito     3. For patients aged 39-70: Has the patient had a colonoscopy / FIT/ Cologuard? NA - based on age      If the patient is female:    4. For patients aged 41-77: Has the patient had a mammogram within the past 2 years? NA - based on age or sex  See top three    5. For patients aged 21-65: Has the patient had a pap smear? Yes - no Care Gap present    Advanced Directive:   1. Do you have an Advanced Directive? NO    2. Would you like information on Advanced Directives?  NO

## 2022-03-28 NOTE — PROGRESS NOTES
Note to patient:  The purpose of this note is to communicate optimally with the other physicians / APCs involved in your care. It is written using standard medical terminology. If you have questions regarding the details of the note, please contact my office to schedule an appointment to address your questions. Beth Jang  Primary Care Office Visit - Problem-Oriented    : 2000   Ulises Velasquez is a 19 y.o. female presenting for  Chief Complaint   Patient presents with    Mental Health Problem            Assessment/Plan:       ICD-10-CM ICD-9-CM   1. Systemic lupus erythematosus, unspecified SLE type, unspecified organ involvement status ([facility]  M32.9 710.0   2. Acute deep vein thrombosis (DVT) of proximal vein of left lower extremity (HCC)  I82.4Y2 453.41   3. Brain fog  R41.89 799.59   4. Anxiety about health  F41.8 300.09   5. Panic attacks  F41.0 300.01   6. Anticoagulated  Z79.01 V58.61   7. Mild persistent asthma without complication  G33.65 330.71   8. Vitamin D deficiency  E55.9 268.9   9. Screen for STD (sexually transmitted disease)  Z11.3 V74.5   10. Dyslipidemia, goal LDL below 100  E78.5 272.4   11. Lupus cerebritis ([facility]  M32.19 710.0     323.81   12. Severe anxiety  F41.9 300.00   13. MDD (major depressive disorder), recurrent episode, moderate ([facility])  F33.1 296.32   14. History of seizure  Z87.898 V12.49     #mild persistent asthma vs mild intermittent  Has not been using Albuterol and Flovent, has had difficulty with spacer. Reviewed instructions and optimal use to improve symptoms. Given difficulty with inhalers, encouraged to find nebulizer machine to utilize nebulizer treatments when having acute illness. Follow up if use of Albuterol inhaler increases to twice weekly or more.      Key COPD Medications             albuterol (PROVENTIL HFA, VENTOLIN HFA, PROAIR HFA) 90 mcg/actuation inhaler (Taking) Take 2 Puffs by inhalation every four (4) hours as needed. albuterol (PROVENTIL VENTOLIN) 2.5 mg /3 mL (0.083 %) nebu 1.5 mL by Nebulization route every four (4) hours as needed for Wheezing, Shortness of Breath or Respiratory Distress. Flovent HFA 44 mcg/actuation inhaler Take 2 Puffs by inhalation two (2) times a day.         Primary concern of #Brain fog - unclear etiology   Initial lab workup ordered    #Under suspicion for Lupus Cerebritis  When first diagnosed with Lupus (Late 2018), was having significant migraines (daily), hyperpigmented rash on arms/face, and fatigue - also notes significant memory loss regarding this time of life. Reports initial symptoms improved after starting Lupus treatment (On Cellcept, Plaquenil) but has had recent recurrence of neuro sx in last few months and interested in evaluation. Referred to Neurology 3/28/22 [facility]) - unable to schedule prior to October 2022     #Hx of Seizure - reports 1 prior seizure; Not on AEDs  Reports occurrence of Seizure around time of initial Lupus diagnosis (Late 2018)  Record review MRI Brain 10/18/18 - Brain looks normal. Increased number of small left intraparotid and retroauricular lymph nodes, nonspecific but likely benign and reactive. Record review EEG 10/18/18: Normal EEG recorded during the awake state.  No epileptiform discharges or focal abnormality was seen. This does not preclude a diagnosis of epilepsy. #Anxiety about health - uncontrolled  With #Panic attacks   Reviewed risk/benefit of both preventative daily treatment and rescue (prn) medication. Fearful of daily preventative medication and declined interest in starting at present  Failed treatments:   · Felt \"zombie\" like with Zoloft (3mo of use). · Xanax helps with sx but reports is too sedating even with use of 1/2 of 0.25mg tablet. · CBD gummies help with sx but too sedating.   Interested in trial of alternative of Lorazepam as rescue agent (previously used as AED and believes tolerated well)    #Right hand nunbness tingling suspect #carpal tunnel   Hx fracture in childhood   Reviewed carpal tunnel conservative treatment/management. Provided instructions for initial treatment.     #Acute DVT of LLE in setting of #Lupus   #Anticoagulated on Xarelto  Established with Rheumatology for Lupus monitoring   Taking cellcept and plaquenil      #HLD - goal <100  Reviewed Statin hx, unclear if needed but has not been using regularly. Interested to stop using given Patient's goal to reduce pill burden and continue with monitoring and focusing on management with heart healthy habits.            Lab Results   Component Value Date/Time     LDL, calculated 106 (H) 07/09/2021 11:24 AM      #Vit D Def - reviewed dx and recommended supplement dosing     #GERD - uncontrolled  Reviewed behavioral and dietary changes to reduce symptoms. Handout provided. Also reviewed optimal use of OTC treatments and consideration for two week PPI (dosed before breakfast)    #HM  Reviewed current vaccination recommendations and would offer additional information if interested. Cervical CA screening - Up to date    Orders Placed This Encounter    MRI BRAIN W WO CONT     Please provide patient with disc to bring to follow ups     Standing Status:   Future     Standing Expiration Date:   5/6/2023     Order Specific Question:   Is Patient Pregnant?      Answer:   No     Order Specific Question:   STAT Creatinine as indicated     Answer:   Yes     Order Specific Question:   Reason for Exam     Answer:   under suspicion for lupus cerebritis with hx of seizures    HEMOGLOBIN A1C WITH EAG     Standing Status:   Future     Standing Expiration Date:   3/28/2023    CBC WITH AUTOMATED DIFF     Standing Status:   Future     Standing Expiration Date:   8/25/8738    METABOLIC PANEL, COMPREHENSIVE     Standing Status:   Future     Standing Expiration Date:   3/28/2023    T4, FREE     Standing Status:   Future     Standing Expiration Date:   3/28/2023    LIPID PANEL Standing Status:   Future     Standing Expiration Date:   3/28/2023    TSH 3RD GENERATION     Standing Status:   Future     Standing Expiration Date:   3/28/2023    VITAMIN D, 25 HYDROXY     Standing Status:   Future     Standing Expiration Date:   3/28/2023    AMMONIA     Standing Status:   Future     Standing Expiration Date:   3/29/2023    SED RATE (ESR)     Standing Status:   Future     Standing Expiration Date:   3/28/2023    C REACTIVE PROTEIN, QT     Standing Status:   Future     Standing Expiration Date:   3/28/2023    HIV 1/2 AG/AB, 4TH GENERATION,W RFLX CONFIRM     Standing Status:   Future     Standing Expiration Date:   3/29/2023    HCV AB W/RFLX TO GLENN     Standing Status:   Future     Standing Expiration Date:   3/29/2023    CHLAMYDIA/NEISSERIA AMPLIFICATION     Standing Status:   Future     Standing Expiration Date:   3/28/2023     Order Specific Question:   Specimen type/source     Answer:   Urine [258]    T PALLIDUM AB     Standing Status:   Future     Standing Expiration Date:   3/28/2023    URINALYSIS W/ RFLX MICROSCOPIC     Standing Status:   Future     Standing Expiration Date:   3/28/2023    VITAMIN B12     Standing Status:   Future     Standing Expiration Date:   3/29/2023   [facility] Neuropsychology Ref     Referral Priority:   Routine     Referral Type:   Consultation     Referral Reason:   Specialty Services Required     Referred to Provider:   Rina Alfred, PHD     Number of Visits Requested:   1    LORazepam (ATIVAN) 0.5 mg tablet     Sig: Take 1 Tablet by mouth daily as needed for Anxiety. Dispense:  30 Tablet     Refill:  0       Follow-up and Dispositions    · Return for as scheduled in April. Reviewed management plan & instructions with patient, who voiced understanding.        Subjective   History:   Jordan Vences is a 19 y.o. female presenting to address:  Chief Complaint   Patient presents with    Mental Health Problem     Last PCP visit: 3/1/2022    Since last visit:   Any changes in health, procedures, hospital visits, or specialist visits? Denies  Tolerating medications without adverse reactions? Reports good compliance with Rx without notable adverse effects. CELESTE 2/7 3/28/2022   Feeling nervous, anxious or on edge? 3   Not being able to stop or control worrying? 3   CELESTE-2 Subtotal 6   Worrying too much about different things? 3   Trouble relaxing? 2   Being so restless that it is hard to sit still? 2   Becoming easily annoyed or irritable? 3   Feeling afraid as if something awful might happen? 2   CELESTE-7 Total Score 18   If you checked off any problems, how difficult have these problems made it for you to do your work, take care of thinks at home, or get along with other people?   Extremely difficult     3 most recent PHQ Screens 3/28/2022   Little interest or pleasure in doing things Several days   Feeling down, depressed, irritable, or hopeless Nearly every day   Total Score PHQ 2 4   Trouble falling or staying asleep, or sleeping too much Nearly every day   Feeling tired or having little energy More than half the days   Poor appetite, weight loss, or overeating Several days   Feeling bad about yourself - or that you are a failure or have let yourself or your family down Several days   Trouble concentrating on things such as school, work, reading, or watching TV Several days   Moving or speaking so slowly that other people could have noticed; or the opposite being so fidgety that others notice More than half the days   Thoughts of being better off dead, or hurting yourself in some way Not at all   PHQ 9 Score 14   How difficult have these problems made it for you to do your work, take care of your home and get along with others Extremely difficult         Review of Systems:     A comprehensive review of systems was negative except for that written in the HPI and A/P         Objective     Physical Assessment:     Visit Vitals  /77 (BP 1 Location: Right arm, BP Patient Position: Sitting, BP Cuff Size: Large adult)   Pulse 77   Temp 97.5 °F (36.4 °C) (Temporal)   Resp 16   Ht 5' 4\" (1.626 m)   Wt 224 lb 8 oz (101.8 kg)   LMP 03/25/2022 (Exact Date)   SpO2 98%   BMI 38.54 kg/m²       Physical Exam  Vitals and nursing note reviewed. Constitutional:       General: She is not in acute distress. Cardiovascular:      Rate and Rhythm: Normal rate and regular rhythm. Pulses: Normal pulses. Heart sounds: Normal heart sounds. Pulmonary:      Effort: Pulmonary effort is normal. No respiratory distress. Breath sounds: Normal breath sounds. Musculoskeletal:      Right lower leg: No edema. Left lower leg: No edema. Neurological:      Mental Status: She is alert and oriented to person, place, and time. Gait: Gait normal.   Psychiatric:         Mood and Affect: Mood is anxious. Speech: Speech normal.         Behavior: Behavior normal.                 This document may have been created with the aid of dictation software. Text may contain errors, particularly phonetic errors.       Berry Quinonez DO  Family Medicine  03/28/2022

## 2022-04-04 ENCOUNTER — APPOINTMENT (OUTPATIENT)
Dept: FAMILY MEDICINE CLINIC | Age: 22
End: 2022-04-04

## 2022-04-05 ENCOUNTER — TELEPHONE (OUTPATIENT)
Dept: FAMILY MEDICINE CLINIC | Age: 22
End: 2022-04-05

## 2022-04-05 LAB — AMMONIA, PLASMA, 007054: 72 MCG/DL (ref 19–65)

## 2022-04-05 NOTE — TELEPHONE ENCOUNTER
Patient called requesting to speak to nurse in regards to lab results, informed  let patient know that we have not received the results yet and once we do we would give her a call to review. Patient became upset demanding to speak with nurse. Informed  to transfer patient back to me. Spoke to patient and informed her that unfortunately, we do not have the result yet and once we receive them all we can give her a call to discuss. Patient became upset and stated that she has viewed one result on [facility] and is very concerned because she goggled it and it stated something about her liver . She stated that if I would listen to her she is trying to give me what the result was so I can tell her what it means. Patient states the results was for Ammonia and it was 72 which is in the high range. Please advise what this means. All other results still pending.

## 2022-04-05 NOTE — TELEPHONE ENCOUNTER
Called office they have nothing sooner however have put patient on cancellation list and they recommend patient calling to check for cancellations.

## 2022-04-05 NOTE — TELEPHONE ENCOUNTER
----- Message from Shady Abdi DO sent at 3/28/2022  4:10 PM EDT -----  Regarding: Neurology referral  Patient was referred to Neurology (by lupus specialist) due to concern for Lupus cerebritis but Patient was unable to schedule before October, she said it was [facility], can you see if they would consider seeing her earlier? She also has hx of seizure. I added the details below to my note so we can send it to them. Primary concern of #Brain fog - unclear etiology     #Under suspicion for Lupus Cerebritis  When first diagnosed with Lupus (Late 2018), was having significant migraines (daily), hyperpigmented rash on arms/face, and fatigue - also notes significant memory loss regarding this time of life. Reports initial symptoms improved after starting Lupus treatment (On Cellcept, Plaquenil) but has had recent recurrence of neuro sx in last few months and interested in evaluation. Referred to Neurology DR SELMA POSADAS [facility]) - unable to schedule prior to October 2022     #Hx of Seizure - reports 1 prior seizure; Not on AEDs  Reports occurrence of Seizure around time of initial Lupus diagnosis (Late 2018)  Record review MRI Brain 10/18/18 - Brain looks normal. Increased number of small left intraparotid and retroauricular lymph nodes, nonspecific but likely benign and reactive. Record review EEG 10/18/18: Normal EEG recorded during the awake state.  No epileptiform discharges or focal abnormality was seen. This does not preclude a diagnosis of epilepsy.

## 2022-04-06 LAB
25(OH)D3 SERPL-MCNC: 27.4 NG/ML (ref 32–100)
A-G RATIO,AGRAT: 1.7 RATIO (ref 1.1–2.6)
ABSOLUTE LYMPHOCYTE COUNT, 10803: 1.9 K/UL (ref 1–4.8)
ALBUMIN SERPL-MCNC: 4.2 G/DL (ref 3.5–5)
ALP SERPL-CCNC: 90 U/L (ref 25–115)
ALT SERPL-CCNC: 12 U/L (ref 5–40)
ANION GAP SERPL CALC-SCNC: 11 MMOL/L (ref 3–15)
AST SERPL W P-5'-P-CCNC: 19 U/L (ref 10–37)
AVG GLU, 10930: 93 MG/DL (ref 91–123)
BASOPHILS # BLD: 0 K/UL (ref 0–0.2)
BASOPHILS NFR BLD: 0 % (ref 0–2)
BILIRUB SERPL-MCNC: 0.3 MG/DL (ref 0.2–1.2)
BILIRUB UR QL: NEGATIVE
BUN SERPL-MCNC: 12 MG/DL (ref 6–22)
C-REACTIVE PROTEIN, QT, 006627: <0.3 MG/DL
CALCIUM SERPL-MCNC: 9.8 MG/DL (ref 8.4–10.5)
CHLAMYDIA AMPLIFIED URINE: NEGATIVE
CHLORIDE SERPL-SCNC: 102 MMOL/L (ref 98–110)
CHOLEST SERPL-MCNC: 160 MG/DL (ref 110–200)
CLARITY: CLEAR
CO2 SERPL-SCNC: 23 MMOL/L (ref 20–32)
COLOR UR: YELLOW
CREAT SERPL-MCNC: 0.7 MG/DL (ref 0.5–1.2)
EOSINOPHIL # BLD: 0.1 K/UL (ref 0–0.5)
EOSINOPHIL NFR BLD: 1 % (ref 0–6)
EPITHELIAL,EPSU: NORMAL /HPF (ref 0–2)
ERYTHROCYTE [DISTWIDTH] IN BLOOD BY AUTOMATED COUNT: 13.2 % (ref 10–15.5)
GC AMPLIFIED URINE,919: NEGATIVE
GFRAA, 66117: >60
GFRNA, 66118: >60
GLOBULIN,GLOB: 2.5 G/DL (ref 2–4)
GLUCOSE SERPL-MCNC: 69 MG/DL (ref 70–99)
GLUCOSE UR QL: NEGATIVE MG/DL
GRANULOCYTES,GRANS: 54 % (ref 40–75)
HBA1C MFR BLD HPLC: 4.9 % (ref 4.8–5.6)
HCT VFR BLD AUTO: 41.2 % (ref 35.1–46.5)
HCV AB SER IA-ACNC: NORMAL
HDLC SERPL-MCNC: 2.9 MG/DL (ref 0–5)
HDLC SERPL-MCNC: 55 MG/DL
HGB BLD-MCNC: 12.3 G/DL (ref 11.7–15.5)
HGB UR QL STRIP: NEGATIVE
HIV -1/0/2 AG/AB WITH REFLEX, 13463: NON REACTIVE
HIV 1 & 2 AB SER-IMP: NORMAL
KETONES UR QL STRIP.AUTO: NEGATIVE MG/DL
LDL/HDL RATIO,LDHD: 1.6
LDLC SERPL CALC-MCNC: 88 MG/DL (ref 50–99)
LEUKOCYTE ESTERASE: NEGATIVE
LYMPHOCYTES, LYMLT: 38 % (ref 20–45)
MCH RBC QN AUTO: 29 PG (ref 26–34)
MCHC RBC AUTO-ENTMCNC: 30 G/DL (ref 31–36)
MCV RBC AUTO: 97 FL (ref 80–99)
MONOCYTES # BLD: 0.3 K/UL (ref 0.1–1)
MONOCYTES NFR BLD: 6 % (ref 3–12)
NEUTROPHILS # BLD AUTO: 2.7 K/UL (ref 1.8–7.7)
NITRITE UR QL STRIP.AUTO: NEGATIVE
NON-HDL CHOLESTEROL, 011976: 105 MG/DL
PH UR STRIP: 6 PH (ref 5–8)
PLATELET # BLD AUTO: 266 K/UL (ref 140–440)
PMV BLD AUTO: 12.2 FL (ref 9–13)
POTASSIUM SERPL-SCNC: 4.3 MMOL/L (ref 3.5–5.5)
PROT SERPL-MCNC: 6.7 G/DL (ref 6.4–8.3)
PROT UR QL STRIP: NEGATIVE MG/DL
RBC # BLD AUTO: 4.24 M/UL (ref 3.8–5.2)
RBC #/AREA URNS HPF: NORMAL /HPF
SED RATE (ESR): 21 MM/HR (ref 0–20)
SODIUM SERPL-SCNC: 136 MMOL/L (ref 133–145)
SOURCE OF URINE, 17028: NORMAL
SP GR UR: 1.02 (ref 1–1.03)
SYPHILIS (T.PALLIDUM) IGG/IGM: NON REACTIVE
T4 FREE SERPL-MCNC: 1.2 NG/DL (ref 0.9–1.8)
TRIGL SERPL-MCNC: 83 MG/DL (ref 40–149)
TSH SERPL DL<=0.005 MIU/L-ACNC: 0.59 MCU/ML (ref 0.27–4.2)
URINE ASCORBIC ACID: NEGATIVE MG/DL
UROBILINOGEN UR STRIP-MCNC: <2 MG/DL
VIT B12 SERPL-MCNC: 493 PG/ML (ref 211–911)
VLDLC SERPL CALC-MCNC: 17 MG/DL (ref 8–30)
WBC # BLD AUTO: 4.9 K/UL (ref 4–11)
WBC URNS QL MICRO: NORMAL /HPF (ref 0–5)

## 2022-04-06 NOTE — PROGRESS NOTES
Will review in detail at follow up 4/7:  STD screening negative  LDL no longer elevated  Kidney, Liver, Thyroid normal  Vit D low  B12 normal  ESR mildly elevated/CRP normal  A1C normal  No anemia   Ammonia slightly above normal, nonspecific, low suspicion contributing to symptoms

## 2022-04-06 NOTE — TELEPHONE ENCOUNTER
Spoke to patient and scheduled VV appt to review labs    Future Appointments   Date Time Provider Timur Mackey   4/7/2022  7:30 AM Robyn Solares DO [facility] BS AMB   4/21/2022  2:00 PM Robyn Solares DO [facility] BS AMB

## 2022-04-07 ENCOUNTER — VIRTUAL VISIT (OUTPATIENT)
Dept: FAMILY MEDICINE CLINIC | Age: 22
End: 2022-04-07
Payer: MEDICAID

## 2022-04-07 ENCOUNTER — TELEPHONE (OUTPATIENT)
Dept: FAMILY MEDICINE CLINIC | Age: 22
End: 2022-04-07

## 2022-04-07 DIAGNOSIS — R41.89 BRAIN FOG: ICD-10-CM

## 2022-04-07 DIAGNOSIS — M32.9 SYSTEMIC LUPUS ERYTHEMATOSUS, UNSPECIFIED SLE TYPE, UNSPECIFIED ORGAN INVOLVEMENT STATUS (HCC): Primary | ICD-10-CM

## 2022-04-07 DIAGNOSIS — J45.30 MILD PERSISTENT ASTHMA WITHOUT COMPLICATION: ICD-10-CM

## 2022-04-07 DIAGNOSIS — F41.8 ANXIETY ABOUT HEALTH: ICD-10-CM

## 2022-04-07 DIAGNOSIS — M32.19 LUPUS CEREBRITIS (HCC): ICD-10-CM

## 2022-04-07 DIAGNOSIS — Z79.01 ANTICOAGULATED: ICD-10-CM

## 2022-04-07 DIAGNOSIS — F41.0 PANIC ATTACKS: ICD-10-CM

## 2022-04-07 DIAGNOSIS — I82.4Y2 ACUTE DEEP VEIN THROMBOSIS (DVT) OF PROXIMAL VEIN OF LEFT LOWER EXTREMITY (HCC): ICD-10-CM

## 2022-04-07 PROCEDURE — 99214 OFFICE O/P EST MOD 30 MIN: CPT | Performed by: STUDENT IN AN ORGANIZED HEALTH CARE EDUCATION/TRAINING PROGRAM

## 2022-04-07 RX ORDER — DULOXETIN HYDROCHLORIDE 20 MG/1
20 CAPSULE, DELAYED RELEASE ORAL DAILY
Qty: 90 CAPSULE | Refills: 1 | Status: SHIPPED
Start: 2022-04-07 | End: 2022-04-22 | Stop reason: SINTOL

## 2022-04-07 NOTE — TELEPHONE ENCOUNTER
Appointment scheduled for next week.      Future Appointments   Date Time Provider Timur Mackey   4/14/2022  7:30 AM Robyn Solares DO [facility] BS AMB   4/21/2022  2:00 PM Robyn Solares DO [facility] BS AMB

## 2022-04-07 NOTE — TELEPHONE ENCOUNTER
----- Message from April Gladis sent at 4/7/2022  8:22 AM EDT -----  Subject: Appointment Request    Reason for Call: Routine Existing Condition Follow Up    QUESTIONS  Type of Appointment? Established Patient  Reason for appointment request? No appointments available during search  Additional Information for Provider? Patient just got off a virtual visit   with her physician Dr. Cozette Dance and was told to schedule an office visit for   a vaginal swab to make sure what she is experiencing is not bacterial or a   yeast infection. No appointments generated for this provider. Please call   patient back to advise of potential appointment options. thank you  ---------------------------------------------------------------------------  --------------  CALL BACK INFO  What is the best way for the office to contact you? OK to leave message on   [employer], OK to respond with electronic message via [employer] portal (only   for patients who have registered [employer] account)  Preferred Call Back Phone Number? [phone]  ---------------------------------------------------------------------------  --------------  SCRIPT ANSWERS  Relationship to Patient? Self  Is this follow up request related to routine Diabetes Management? No  Have you been diagnosed with, awaiting test results for, or told that you   are suspected of having COVID-19 (Coronavirus)? (If patient has tested   negative or was tested as a requirement for work, school, or travel and   not based on symptoms, answer no)? No  Within the past 10 days have you developed any of the following symptoms   (answer no if symptoms have been present longer than 10 days or began   more than 10 days ago)? Fever or Chills, Cough, Shortness of breath or   difficulty breathing, Loss of taste or smell, Sore throat, Nasal   congestion, Sneezing or runny nose, Fatigue or generalized body aches   (answer no if pain is specific to a body part e.g. back pain), Diarrhea,   Headache?  No  Have you had close contact with someone with COVID-19 in the last 7 days? No  (Service Expert  click yes below to proceed with [email or web address] As Usual   Scheduling)?  Yes

## 2022-04-07 NOTE — PROGRESS NOTES
Note to patient:  The purpose of this note is to communicate optimally with the other physicians / APCs involved in your care. It is written using standard medical terminology. If you have questions regarding the details of the note, please contact my office to schedule an appointment to address your questions. Beth Jang  Primary Care Office Visit - Telemedicine Problem-Oriented Note    : 2000   Rohan Miguel is a 19 y.o. female presenting for  No chief complaint on file. Assessment/Plan:       ICD-10-CM ICD-9-CM   1. Systemic lupus erythematosus, unspecified SLE type, unspecified organ involvement status ([facility] [address]  M32.9 710.0   2. Acute deep vein thrombosis (DVT) of proximal vein of left lower extremity (HCC)  I82.4Y2 453.41   3. Brain fog  R41.89 799.59   4. Anxiety about health  F41.8 300.09   5. Panic attacks  F41.0 300.01   6. Anticoagulated  Z79.01 V58.61   7. Mild persistent asthma without complication  K82.24 081.62   8. Lupus cerebritis (HCC)  M32.19 710.0     323.81     Lab review:   STD screening negative  LDL no longer elevated  Kidney, Liver, Thyroid normal  Vit D low - Was taking 5000iu daily; increase to 10K daily  B12 normal  ESR mildly elevated/CRP normal  A1C normal  No anemia   Ammonia slightly above normal, nonspecific, could be related to how blood sample obtained and handled,  low suspicion contributing to symptoms    #mild persistent asthma vs mild intermittent  Has not been using Albuterol and Flovent, has had difficulty with spacer. Reviewed instructions and optimal use to improve symptoms.  Given difficulty with inhalers, encouraged to find nebulizer machine to utilize nebulizer treatments when having acute illness.    Follow up if use of Albuterol inhaler increases to twice weekly or more.            Key COPD Medications                 albuterol (PROVENTIL HFA, VENTOLIN HFA, PROAIR HFA) 90 mcg/actuation inhaler (Taking) Take 2 Puffs by inhalation every four (4) hours as needed.     albuterol (PROVENTIL VENTOLIN) 2.5 mg /3 mL (0.083 %) nebu 1.5 mL by Nebulization route every four (4) hours as needed for Wheezing, Shortness of Breath or Respiratory Distress.     Flovent HFA 44 mcg/actuation inhaler Take 2 Puffs by inhalation two (2) times a day.          Primary concern of #Brain fog - unclear etiology   Initial lab workup without contributing factors  MRI brain with and without contrast ordered     #Under suspicion for Lupus Cerebritis  When first diagnosed with Lupus (Late 2018), was having significant migraines (daily), hyperpigmented rash on arms/face, and fatigue - also notes significant memory loss regarding this time of life. Reports initial symptoms improved after starting Lupus treatment (On Cellcept, Plaquenil) but has had recent recurrence of neuro sx in last few months and interested in evaluation. Referred to Neurology 3/28/22 DR HA[facility]) - unable to schedule prior to October 2022     #Hx of Seizure - reports 1 prior seizure; Not on AEDs  Reports occurrence of Seizure around time of initial Lupus diagnosis (Late 2018)  Record review MRI Brain 10/18/18 - Brain looks normal. Increased number of small left intraparotid and retroauricular lymph nodes, nonspecific but likely benign and reactive. Record review EEG 10/18/18: Normal EEG recorded during the awake state.  No epileptiform discharges or focal abnormality was seen. This does not preclude a diagnosis of epilepsy.      #Anxiety about health - uncontrolled  With #Panic attacks   Reviewed risk/benefit of both preventative daily treatment and rescue (prn) medication.     Failed treatments:   · Felt \"zombie\" like with Zoloft (3mo of use).    · Xanax helps with sx but reports is too sedating even with use of 1/2 of 0.25mg tablet. · CBD gummies help with sx but too sedating.     Current regimen and adjustments:   · Fearful of daily preventative medication but now agreeable to try non SSRI, plan to start low dose Duloxetine with slow titration to monitor for AE to help reduce need for rescue agent. ·  Lorazepam as rescue agent (previously used as AED and believes tolerated well). Goal to use less than twice weekly on average. Reviewed to avoid prolonged regular use >6wks to prevent dependence. #Right hand numbness tingling suspect #carpal tunnel   Hx fracture in childhood   Reviewed carpal tunnel conservative treatment/management. Provided instructions for initial treatment.     #Acute DVT of LLE in setting of #Lupus   #Anticoagulated on Xarelto  Established with Rheumatology for Lupus monitoring   Taking cellcept and plaquenil      #HLD - goal <100; Newly at goal!  Reviewed Statin hx, unclear if needed but has not been using regularly. Interested to stop using given Patient's goal to reduce pill burden and continue with monitoring and focusing on management with heart healthy habits.      Lab Results   Component Value Date/Time    LDL, calculated 88 04/04/2022 01:21 PM     #Vit D Def - reviewed dx and recommended supplement dosing     #GERD - uncontrolled  Reviewed behavioral and dietary changes to reduce symptoms. Handout provided. Also reviewed optimal use of OTC treatments and consideration for two week PPI (dosed before breakfast)  EGD? Never had.      #HM  Reviewed current vaccination recommendations and would offer additional information if interested. Cervical CA screening - Up to date    Orders Placed This Encounter    DULoxetine (CYMBALTA) 20 mg capsule     Sig: Take 1 Capsule by mouth daily. Indications: anxiousness associated with depression     Dispense:  90 Capsule     Refill:  1     Reviewed management plan & instructions with patient, who voiced understanding. Subjective   History:   Mark Denis is a 19 y.o. female presenting to address:  No chief complaint on file.     Last PCP visit: 3/28/2022    Since last visit:   Any changes in medication, procedures, hospital visits, or specialist visits? Denies  Tolerating medications without adverse reactions? Reports good compliance with Rx without notable adverse effects. Current Outpatient Medications   Medication Sig    DULoxetine (CYMBALTA) 20 mg capsule Take 1 Capsule by mouth daily. Indications: anxiousness associated with depression    LORazepam (ATIVAN) 0.5 mg tablet Take 1 Tablet by mouth daily as needed for Anxiety.  albuterol (PROVENTIL VENTOLIN) 2.5 mg /3 mL (0.083 %) nebu 1.5 mL by Nebulization route every four (4) hours as needed for Wheezing, Shortness of Breath or Respiratory Distress. (Patient not taking: Reported on 3/28/2022)    rivaroxaban (Xarelto) 20 mg tab tablet Take 1 Tablet by mouth daily.  Flovent HFA 44 mcg/actuation inhaler Take 2 Puffs by inhalation two (2) times a day. (Patient not taking: Reported on 3/28/2022)    loratadine (CLARITIN) 10 mg tablet loratadine 10 mg tablet  Take 1 tablet every day by oral route    albuterol (PROVENTIL HFA, VENTOLIN HFA, PROAIR HFA) 90 mcg/actuation inhaler Take 2 Puffs by inhalation every four (4) hours as needed.  fluticasone propionate (FLONASE) 50 mcg/actuation nasal spray 2 Sprays by Both Nostrils route daily.  mycophenolate (CELLCEPT) 500 mg tablet Take 1 Tab by mouth two (2) times a day.  hydroxychloroquine (PLAQUENIL) 200 mg tablet Take 200 mg by mouth daily. No current facility-administered medications for this visit. Review of Systems:     A comprehensive review of systems was negative except for that written in the HPI and A/P         Objective     Physical Assessment:     Physical Exam  Nursing note reviewed. Constitutional:       General: She is not in acute distress. Pulmonary:      Effort: Pulmonary effort is normal. No respiratory distress. Neurological:      Mental Status: She is alert and oriented to person, place, and time. Psychiatric:         Mood and Affect: Mood is anxious.          Behavior: Behavior normal.         Thought Content: Thought content normal.         Judgment: Judgment normal.              Ladi Gil, who was evaluated through a synchronous (real-time) audio-video encounter, and/or her healthcare decision maker, is aware that it is a billable service, with coverage as determined by her insurance carrier. She provided verbal consent to proceed: Yes, and patient identification was verified. This visit was conducted pursuant to the emergency declaration under the [address] [place] [address] and the [employer] General Act. A caregiver was present when appropriate. Ability to conduct physical exam was limited. The patient was located in a state where the provider was credentialed to provide care.      Kelle Kurtz DO  Family Medicine  04/07/2022

## 2022-04-11 NOTE — PROGRESS NOTES
Note to patient:  The purpose of this note is to communicate optimally with the other physicians / APCs involved in your care. It is written using standard medical terminology. If you have questions regarding the details of the note, please contact my office to schedule an appointment to address your questions. Beth Jang  Primary Care Office Visit - Problem-Oriented    : 2000   Jordan Vences is a 19 y.o. female presenting for  Chief Complaint   Patient presents with   [facility] Follow Up     [place] for Acid Reflux          Assessment/Plan:       ICD-10-CM ICD-9-CM   1. Vaginal irritation  N89.8 623.9   2. Anxiety about health  F41.8 300.09   3. Panic attacks  F41.0 300.01   4. Medication monitoring encounter  Z51.81 V58.83      Primary concern of #Brain fog - unclear etiology   Initial lab workup without contributing factors  MRI brain with and without contrast ordered     #Under suspicion for Lupus Cerebritis  When first diagnosed with Lupus (Late ), was having significant migraines (daily), hyperpigmented rash on arms/face, and fatigue - also notes significant memory loss regarding this time of life. Reports initial symptoms improved after starting Lupus treatment (On Cellcept, Plaquenil) but has had recent recurrence of neuro sx in last few months and interested in evaluation.    Referred to Neurology 3/28/22 ([facility]) - unable to schedule prior to 2022      #Hx of Seizure - reports 1 prior seizure; Not on AEDs  Reports occurrence of Seizure around time of initial Lupus diagnosis (Late )  Record review MRI Brain 10/18/18 -Bulah Antis looks normal. Increased number of small left intraparotid and retroauricular lymph nodes, nonspecific but likely benign and reactive.   Record review EEG 10/18/18: Normal EEG recorded during the awake state.  No epileptiform discharges or focal abnormality was seen.  This does not preclude a diagnosis of epilepsy.      #Vaginal irritation - suspect yeast infection, reports hx of recurrent inf  Had (fluconazole) and took 1 dose - symptoms resolved 2 days later  Nuswab collected and sent to confirm resolution   Provided 1 dose Fluconazole to use if put on abx and having sx     #mild persistent asthma vs mild intermittent  Has not been using Albuterol and Flovent, has had difficulty with spacer. Reviewed instructions and optimal use to improve symptoms. Given difficulty with inhalers, encouraged to find nebulizer machine to utilize nebulizer treatments when having acute illness.    Follow up if use of Albuterol inhaler increases to twice weekly or more. Established with Pulmonology; Encouraged to follow up and pursue sleep study to r/o MADDY contributing to sx (STOP Bang screen 3, high risk)                       Key COPD Medications                 albuterol (PROVENTIL HFA, VENTOLIN HFA, PROAIR HFA) 90 mcg/actuation inhaler (Taking) Take 2 Puffs by inhalation every four (4) hours as needed.     albuterol (PROVENTIL VENTOLIN) 2.5 mg /3 mL (0.083 %) nebu 1.5 mL by Nebulization route every four (4) hours as needed for Wheezing, Shortness of Breath or Respiratory Distress.     Flovent HFA 44 mcg/actuation inhaler Take 2 Puffs by inhalation two (2) times a day.          #Anxiety about health - uncontrolled  With #Panic attacks   Reviewed risk/benefit of both preventative daily treatment and rescue (prn) medication.      Failed treatments:   · Felt \"zombie\" like with Zoloft (3mo of use).    · Xanax helps with sx but reports is too sedating even with use of 1/2 of 0.25mg tablet. · CBD gummies help with sx but too sedating.     Current regimen and adjustments:   · Fearful of daily preventative medication but now agreeable to try non SSRI, plan to start low dose Duloxetine with slow titration to monitor for AE to help reduce need for rescue agent. (has been on ~1wk)  ·  Lorazepam as rescue agent (previously used as AED and believes tolerated well). Goal to use less than twice weekly on average. Reviewed to avoid prolonged regular use >6wks to prevent dependence.     #Right hand numbness tingling suspect #carpal tunnel   Hx fracture in childhood   Reviewed carpal tunnel conservative treatment/management. Provided instructions for initial treatment.     #Acute DVT of LLE in setting of #Lupus   #Anticoagulated on Xarelto  Established with Rheumatology for Lupus monitoring   Taking cellcept and plaquenil      #HLD - goal <100; Newly at goal!  Reviewed Statin hx, unclear if needed but has not been using regularly. Interested to stop using given Patient's goal to reduce pill burden and continue with monitoring and focusing on management with heart healthy habits.            Lab Results   Component Value Date/Time     LDL, calculated 88 04/04/2022 01:21 PM      #Vit D Def - reviewed dx and recommended supplement dosing     #GERD - uncontrolled  Reviewed behavioral and dietary changes to reduce symptoms. Handout provided. Also reviewed optimal use of OTC treatments and consideration for two week PPI (dosed before breakfast)  EGD? Never had. Established with GI and planning for EGD. Reviewed procedure with patient.      #HM  Reviewed current vaccination recommendations and would offer additional information if interested. Cervical CA screening - Up to date    Orders Placed This Encounter    NUSWAB VG PLUS+MYCOPLASMAS,GLENN     Standing Status:   Future     Standing Expiration Date:   4/12/2023     Order Specific Question:   Specimen source     Answer:   Discharge [435]    famotidine (PEPCID) 20 mg tablet    sucralfate (CARAFATE) 100 mg/mL suspension     Sig: Take 1,000 mg by mouth.  ondansetron (ZOFRAN ODT) 4 mg disintegrating tablet     Sig: Take 4 mg by mouth every eight (8) hours as needed.  fluconazole (DIFLUCAN) 150 mg tablet     Sig: Take 1 Tablet by mouth daily for 1 day. FDA advises cautious prescribing of oral fluconazole in pregnancy.   Indications: a yeast infection of the vagina and vulva     Dispense:  1 Tablet     Refill:  0       Follow-up and Dispositions    · Return in about 2 months (around 6/14/2022) for after scope with GI. Reviewed management plan & instructions with patient, who voiced understanding. Subjective   History:   Deb Flores is a 19 y.o. female presenting to address:  Chief Complaint   Patient presents with   [facility] Follow Up     [place] for Acid Reflux     Last PCP visit: 4/7/2022    Since last visit:   Any changes in health, procedures, hospital visits, or specialist visits? See A/P  Tolerating medications without adverse reactions? Reports good compliance with Rx without notable adverse effects. Current Outpatient Medications   Medication Sig    famotidine (PEPCID) 20 mg tablet     sucralfate (CARAFATE) 100 mg/mL suspension Take 1,000 mg by mouth.  ondansetron (ZOFRAN ODT) 4 mg disintegrating tablet Take 4 mg by mouth every eight (8) hours as needed.  fluconazole (DIFLUCAN) 150 mg tablet Take 1 Tablet by mouth daily for 1 day. FDA advises cautious prescribing of oral fluconazole in pregnancy. Indications: a yeast infection of the vagina and vulva    DULoxetine (CYMBALTA) 20 mg capsule Take 1 Capsule by mouth daily. Indications: anxiousness associated with depression    rivaroxaban (Xarelto) 20 mg tab tablet Take 1 Tablet by mouth daily.  Flovent HFA 44 mcg/actuation inhaler Take 2 Puffs by inhalation two (2) times a day.  loratadine (CLARITIN) 10 mg tablet loratadine 10 mg tablet  Take 1 tablet every day by oral route    albuterol (PROVENTIL HFA, VENTOLIN HFA, PROAIR HFA) 90 mcg/actuation inhaler Take 2 Puffs by inhalation every four (4) hours as needed.  fluticasone propionate (FLONASE) 50 mcg/actuation nasal spray 2 Sprays by Both Nostrils route daily.  hydroxychloroquine (PLAQUENIL) 200 mg tablet Take 200 mg by mouth daily.     LORazepam (ATIVAN) 0.5 mg tablet Take 1 Tablet by mouth daily as needed for Anxiety. (Patient not taking: Reported on 4/14/2022)    albuterol (PROVENTIL VENTOLIN) 2.5 mg /3 mL (0.083 %) nebu 1.5 mL by Nebulization route every four (4) hours as needed for Wheezing, Shortness of Breath or Respiratory Distress. (Patient not taking: Reported on 3/28/2022)    mycophenolate (CELLCEPT) 500 mg tablet Take 1 Tab by mouth two (2) times a day. (Patient not taking: Reported on 4/14/2022)     No current facility-administered medications for this visit. Review of Systems:     A comprehensive review of systems was negative except for that written in the HPI and A/P         Objective     Physical Assessment:     Visit Vitals  /77 (BP 1 Location: Right arm, BP Patient Position: Sitting, BP Cuff Size: Adult)   Pulse 77   Temp 98.3 °F (36.8 °C) (Temporal)   Resp 16   Ht 5' 4\" (1.626 m)   Wt 221 lb 6.4 oz (100.4 kg)   LMP 03/25/2022 (Exact Date)   SpO2 98%   BMI 38.00 kg/m²       Physical Exam  Vitals and nursing note reviewed. Constitutional:       General: She is not in acute distress. Cardiovascular:      Rate and Rhythm: Normal rate and regular rhythm. Pulses: Normal pulses. Heart sounds: Normal heart sounds. Pulmonary:      Effort: Pulmonary effort is normal. No respiratory distress. Breath sounds: Normal breath sounds. Genitourinary:     Exam position: Lithotomy position. Pubic Area: No rash. Labia:         Right: No rash. Left: No rash. Vagina: Vaginal discharge ( physiologic) present. No lesions. Musculoskeletal:      Right lower leg: No edema. Left lower leg: No edema. Neurological:      Mental Status: She is alert and oriented to person, place, and time. Gait: Gait normal.   Psychiatric:         Mood and Affect: Mood is anxious. Speech: Speech normal.         Behavior: Behavior normal.                 This document may have been created with the aid of dictation software.   Text may contain errors, particularly phonetic errors.       Ruiz Grady, DO  Family Medicine  04/14/2022

## 2022-04-14 ENCOUNTER — TELEPHONE (OUTPATIENT)
Dept: FAMILY MEDICINE CLINIC | Age: 22
End: 2022-04-14

## 2022-04-14 ENCOUNTER — OFFICE VISIT (OUTPATIENT)
Dept: FAMILY MEDICINE CLINIC | Age: 22
End: 2022-04-14
Payer: MEDICAID

## 2022-04-14 ENCOUNTER — PATIENT MESSAGE (OUTPATIENT)
Dept: NEUROLOGY | Age: 22
End: 2022-04-14

## 2022-04-14 VITALS
DIASTOLIC BLOOD PRESSURE: 77 MMHG | OXYGEN SATURATION: 98 % | HEIGHT: 64 IN | SYSTOLIC BLOOD PRESSURE: 112 MMHG | HEART RATE: 77 BPM | RESPIRATION RATE: 16 BRPM | TEMPERATURE: 98.3 F | WEIGHT: 221.4 LBS | BODY MASS INDEX: 37.8 KG/M2

## 2022-04-14 DIAGNOSIS — Z51.81 MEDICATION MONITORING ENCOUNTER: ICD-10-CM

## 2022-04-14 DIAGNOSIS — N89.8 VAGINAL IRRITATION: Primary | ICD-10-CM

## 2022-04-14 DIAGNOSIS — F41.0 PANIC ATTACKS: ICD-10-CM

## 2022-04-14 DIAGNOSIS — F41.8 ANXIETY ABOUT HEALTH: ICD-10-CM

## 2022-04-14 PROCEDURE — 99214 OFFICE O/P EST MOD 30 MIN: CPT | Performed by: STUDENT IN AN ORGANIZED HEALTH CARE EDUCATION/TRAINING PROGRAM

## 2022-04-14 RX ORDER — FAMOTIDINE 20 MG/1
20 TABLET, FILM COATED ORAL 2 TIMES DAILY
COMMUNITY
Start: 2022-04-08 | End: 2022-07-25 | Stop reason: SINTOL

## 2022-04-14 RX ORDER — FLUCONAZOLE 150 MG/1
150 TABLET ORAL DAILY
Qty: 1 TABLET | Refills: 0 | Status: SHIPPED | OUTPATIENT
Start: 2022-04-14 | End: 2022-04-15

## 2022-04-14 RX ORDER — ONDANSETRON 4 MG/1
4 TABLET, ORALLY DISINTEGRATING ORAL
COMMUNITY
Start: 2022-04-07 | End: 2022-06-07

## 2022-04-14 RX ORDER — SUCRALFATE 1 G/10ML
1000 SUSPENSION ORAL
COMMUNITY
Start: 2022-04-07 | End: 2022-06-07

## 2022-04-14 NOTE — TELEPHONE ENCOUNTER
Dr Gayle Donovan office called trying to get clarification on what we needed from them. The patient called them and was not clear on what it was we were looking for. Please advise.

## 2022-04-14 NOTE — PATIENT INSTRUCTIONS
You have been referred to Neuropsychiatry.  Can call the following number to set up evaluation:    Jose G Skelton  [address] [place] [id number]  Phone: [phone]  Fax: [phone]

## 2022-04-14 NOTE — PROGRESS NOTES
Tanya Pizano is a 19 y.o. female (: 2000) presenting to address:    Chief Complaint   Patient presents with   [facility] Follow Up     [place] for Acid Reflux       Vitals:    22 0736   BP: 112/77   Pulse: 77   Resp: 16   Temp: 98.3 °F (36.8 °C)   TempSrc: Temporal   SpO2: 98%   Weight: 221 lb 6.4 oz (100.4 kg)   Height: 5' 4\" (1.626 m)   PainSc:   0 - No pain   LMP: 2022       Hearing/Vision:   No exam data present    Learning Assessment:     Learning Assessment 2021   PRIMARY LEARNER Patient   HIGHEST LEVEL OF EDUCATION - PRIMARY LEARNER  -   BARRIERS PRIMARY LEARNER -   CO-LEARNER CAREGIVER -   PRIMARY LANGUAGE ENGLISH   LEARNER PREFERENCE PRIMARY DEMONSTRATION     READING   ANSWERED BY patient    RELATIONSHIP SELF     Depression Screening:     3 most recent PHQ Screens 2022   Little interest or pleasure in doing things Several days   Feeling down, depressed, irritable, or hopeless Several days   Total Score PHQ 2 2   Trouble falling or staying asleep, or sleeping too much Several days   Feeling tired or having little energy Nearly every day   Poor appetite, weight loss, or overeating Several days   Feeling bad about yourself - or that you are a failure or have let yourself or your family down Not at all   Trouble concentrating on things such as school, work, reading, or watching TV Not at all   Moving or speaking so slowly that other people could have noticed; or the opposite being so fidgety that others notice More than half the days   Thoughts of being better off dead, or hurting yourself in some way Not at all   PHQ 9 Score 9   How difficult have these problems made it for you to do your work, take care of your home and get along with others Very difficult     Fall Risk Assessment:     Fall Risk Assessment, last 12 mths 2022   Able to walk? Yes   Fall in past 12 months? 0   Do you feel unsteady?  0   Are you worried about falling 0     Abuse Screening:     Abuse Screening Questionnaire 4/14/2022   Do you ever feel afraid of your partner? N   Are you in a relationship with someone who physically or mentally threatens you? N   Is it safe for you to go home? Y     ADL Assessment:   No flowsheet data found. Coordination of Care Questionaire:   1. \"Have you been to the ER, urgent care clinic since your last visit? Hospitalized since your last visit? \" Yes Where: [facility]    2. \"Have you seen or consulted any other health care providers outside of the [address] [place] [address] since your last visit? \" Yes Where: GYN     3. For patients aged 39-70: Has the patient had a colonoscopy? NA - based on age     If the patient is female:    4. For patients aged 41-77: Has the patient had a mammogram within the past 2 years? NA - based on age    11. For patients aged 21-65: Has the patient had a pap smear? Yes - no Care Gap present    Advanced Directive:   1. Do you have an Advanced Directive? NO    2. Would you like information on Advanced Directives?  NO

## 2022-04-18 NOTE — TELEPHONE ENCOUNTER
Spoke to Colton and just informed her that we told her to follow up with them to evaluate and rule out MADDY

## 2022-04-18 NOTE — TELEPHONE ENCOUNTER
Called and left message for Tomeka Martins to return call to inform that patient was just needing to schedule a follow up and per last office note with PCP:    #mild persistent asthma vs mild intermittent  Has not been using Albuterol and Flovent, has had difficulty with spacer. Reviewed instructions and optimal use to improve symptoms. Given difficulty with inhalers, encouraged to find nebulizer machine to utilize nebulizer treatments when having acute illness.    Follow up if use of Albuterol inhaler increases to twice weekly or more. Established with Pulmonology;  Encouraged to follow up and pursue sleep study to r/o MADDY contributing to sx (STOP Bang screen 3, high risk)

## 2022-04-19 LAB
BACTERIAL VAGINOSIS, NAA: NEGATIVE
CANDIDA GLABRATA, NAA, 180057: NEGATIVE
CANDIDA SPECIES, NAA: NEGATIVE
CHLAMYDIA TRACHOMATIS, NAA, 180097: NEGATIVE
MYCOPLASMA GENITALIUM, SWAB: NEGATIVE
MYCOPLASMA HOMINIS, SWAB: NEGATIVE
NEISSERIA GONORRHOEAE, NAA, 180104: NEGATIVE
TRICH VAG BY NAA, 180087: NEGATIVE
UREAPLASMA SPP, SWAB: NEGATIVE

## 2022-04-22 ENCOUNTER — TELEPHONE (OUTPATIENT)
Dept: FAMILY MEDICINE CLINIC | Age: 22
End: 2022-04-22

## 2022-04-22 RX ORDER — FLUOXETINE 10 MG/1
10 CAPSULE ORAL DAILY
Qty: 30 CAPSULE | Refills: 1 | Status: SHIPPED | OUTPATIENT
Start: 2022-04-22 | End: 2022-05-22

## 2022-04-22 NOTE — TELEPHONE ENCOUNTER
Spoke to patient and informed as well as scheduled 1 month follow up    Future Appointments   Date Time Provider Timur Mackey   4/29/2022 10:30 AM Yaniv Chu, PHD [facility]   5/23/2022  9:00 AM Robyn Solares DO [facility]

## 2022-04-22 NOTE — TELEPHONE ENCOUNTER
Can you update Ladi? I sent alternative of Fluoxetine (lowest dose available) for her. She can start once she is no longer taking Cymbalta and should schedule follow up in 1mo to reevaluate.  (virtual would be ok)

## 2022-04-22 NOTE — TELEPHONE ENCOUNTER
----- Message from Geneva Frausto sent at 4/22/2022  8:29 AM EDT -----  Subject: Medication Problem    QUESTIONS  Name of Medication? DULoxetine (CYMBALTA) 20 mg capsule  Patient-reported dosage and instructions? 20 mg   What question or problem do you have with the medication? Patient states   she does not want to take this medication any more because it gives her   very disturbing thoughts. She wants to know if there is something else Dr. Neris Velasquez could call in. Preferred Pharmacy? [address],   [address] [facility] phone number (if available)? [phone]  Additional Information for Provider?   ---------------------------------------------------------------------------  --------------  [address]  What is the best way for the office to contact you? OK to leave message on   voicemail  Preferred Call Back Phone Number? [phone]  ---------------------------------------------------------------------------  --------------  SCRIPT ANSWERS  Relationship to Patient?  Self

## 2022-04-22 NOTE — TELEPHONE ENCOUNTER
Patient requesting something else to take besides Cymbalta she states it is causing her disturbing thoughts.

## 2022-04-25 ENCOUNTER — TELEPHONE (OUTPATIENT)
Dept: FAMILY MEDICINE CLINIC | Age: 22
End: 2022-04-25

## 2022-04-25 NOTE — TELEPHONE ENCOUNTER
----- Message from Jacinta Parker sent at 4/25/2022  8:55 AM EDT -----  Subject: Message to Provider    QUESTIONS  Information for Provider? PT went to get her MRI on 4.24.22 and PT was   told to reach out as they could not find the PTs veins for contrast. PT   just got an MRI with OUT contrast and would like to know if those results   are acceptable?   ---------------------------------------------------------------------------  --------------  CALL BACK INFO  What is the best way for the office to contact you? Do not leave any   message, patient will call back for answer  Preferred Call Back Phone Number? [phone]  ---------------------------------------------------------------------------  --------------  SCRIPT ANSWERS  Relationship to Patient?  Self

## 2022-04-25 NOTE — TELEPHONE ENCOUNTER
Please advise, also results are not back yet patient just wanted to make sure it was ok that it was done without contrast.

## 2022-04-26 ENCOUNTER — TELEPHONE (OUTPATIENT)
Dept: FAMILY MEDICINE CLINIC | Age: 22
End: 2022-04-26

## 2022-04-26 NOTE — TELEPHONE ENCOUNTER
Spoke to patient and informed VV scheduled    Future Appointments   Date Time Provider Timru Key   4/27/2022  3:00 PM Tona Byrd DO [facility] BS AMB   4/29/2022 10:30 AM Liane Kraft, PHD [facility] BS AMB   5/23/2022  9:00 AM Robyn Solares DO [facility] BS AMB

## 2022-04-26 NOTE — TELEPHONE ENCOUNTER
Can we schedule for virtual follow up? I reviewed the MRI and it is showing mild signs of sinus disease, not any dangerous findings.  The brain itself looks normal.

## 2022-04-26 NOTE — TELEPHONE ENCOUNTER
On-call telephone contact from the patient on 4/25/2022 reporting extreme anxiety associated with brain MRI findings apparently she has been able to see online. She reports that the results include \"periosteal thickening\" she has looked that up on the Internet and found that it might represent \"precancerous changes\". I have been unable to locate the MRI results. She was reassured that it would seem very unlikely given her history and results thus far as she has been able to explain them that this represents a malignant process however she was advised to contact her PCP for additional clarification.

## 2022-04-26 NOTE — TELEPHONE ENCOUNTER
Pt scheduled for VV    Future Appointments   Date Time Provider Timur Key   4/27/2022  3:00 PM Frankey Slick, DO [facility] BS AMB   4/29/2022 10:30 AM Praful Simon, PHD [facility] BS AMB   5/23/2022  9:00 AM Robyn Solares DO [facility] BS AMB

## 2022-04-27 ENCOUNTER — VIRTUAL VISIT (OUTPATIENT)
Dept: FAMILY MEDICINE CLINIC | Age: 22
End: 2022-04-27
Payer: MEDICAID

## 2022-04-27 DIAGNOSIS — K21.00 GASTROESOPHAGEAL REFLUX DISEASE WITH ESOPHAGITIS WITHOUT HEMORRHAGE: Primary | ICD-10-CM

## 2022-04-27 DIAGNOSIS — K22.2 ESOPHAGEAL STENOSIS: ICD-10-CM

## 2022-04-27 DIAGNOSIS — F41.8 ANXIETY ABOUT HEALTH: ICD-10-CM

## 2022-04-27 DIAGNOSIS — Z79.01 ANTICOAGULATED: ICD-10-CM

## 2022-04-27 DIAGNOSIS — J32.0 LEFT MAXILLARY SINUSITIS: ICD-10-CM

## 2022-04-27 DIAGNOSIS — M32.9 SYSTEMIC LUPUS ERYTHEMATOSUS, UNSPECIFIED SLE TYPE, UNSPECIFIED ORGAN INVOLVEMENT STATUS (HCC): ICD-10-CM

## 2022-04-27 DIAGNOSIS — I82.4Y2 ACUTE DEEP VEIN THROMBOSIS (DVT) OF PROXIMAL VEIN OF LEFT LOWER EXTREMITY (HCC): ICD-10-CM

## 2022-04-27 DIAGNOSIS — F41.0 PANIC ATTACKS: ICD-10-CM

## 2022-04-27 DIAGNOSIS — J45.30 MILD PERSISTENT ASTHMA WITHOUT COMPLICATION: ICD-10-CM

## 2022-04-27 DIAGNOSIS — K29.40 ATROPHIC GASTRITIS WITHOUT HEMORRHAGE: ICD-10-CM

## 2022-04-27 DIAGNOSIS — R41.89 BRAIN FOG: ICD-10-CM

## 2022-04-27 PROCEDURE — 99214 OFFICE O/P EST MOD 30 MIN: CPT | Performed by: STUDENT IN AN ORGANIZED HEALTH CARE EDUCATION/TRAINING PROGRAM

## 2022-04-27 RX ORDER — FLUTICASONE PROPIONATE 50 MCG
2 SPRAY, SUSPENSION (ML) NASAL DAILY
Qty: 1 EACH | Refills: 1 | Status: SHIPPED | OUTPATIENT
Start: 2022-04-27

## 2022-04-27 RX ORDER — LORATADINE 10 MG/1
TABLET ORAL
Qty: 90 TABLET | Refills: 1 | Status: SHIPPED | OUTPATIENT
Start: 2022-04-27

## 2022-04-27 NOTE — PROGRESS NOTES
Ladi Gil (: 2000) is a 19 y.o. female, established patient, here for:    ASSESSMENT/PLAN:    ICD-10-CM ICD-9-CM   1. Gastroesophageal reflux disease with esophagitis without hemorrhage  K21.00 530.81     530.10   2. Esophageal stenosis  K22.2 530.3   3. Atrophic gastritis without hemorrhage  K29.40 535.10   4. Left maxillary sinusitis  J32.0 473.0   5. Anxiety about health  F41.8 300.09   6. Systemic lupus erythematosus, unspecified SLE type, unspecified organ involvement status ([facility] [address]  M32.9 710.0   7. Acute deep vein thrombosis (DVT) of proximal vein of left lower extremity (HCC)  I82.4Y2 453.41   8. Anticoagulated  Z79.01 V58.61   9. Brain fog  R41.89 799.59   10. Mild persistent asthma without complication  R82.20 308.70   11. Panic attacks  F41.0 300.01     #GERD with esophagitis  And #Esophageal stenosis s/p dilation  And #Atrophic gastritis  Rec review of EGD 22 with \"LA Grade A reflux esophagitis. Rule out Dutta's esophagus. Biopsied. Benign-appearing esophageal stenosis. Dilated. Atrophic gastritis. Biopsied. Normal examined duodenum\" and plan to increase Omeprazole to 40mg and for gastric emptying study and repeat EGD prn for retreatment    Primary concern of #Brain fog - unclear etiology   Initial lab workup without contributing factors  Record review MRI Brain 10/18/18 -Noralee Rota looks normal. Increased number of small left intraparotid and retroauricular lymph nodes, nonspecific but likely benign and reactive.   MRI brain 22 with no acute infarct, hemorrhage, mass effect, or herniation.     #Under suspicion for Lupus Cerebritis  When first diagnosed with Lupus (Late 2018), was having significant migraines (daily), hyperpigmented rash on arms/face, and fatigue - also notes significant memory loss regarding this time of life.  Reports initial symptoms improved after starting Lupus treatment (On Cellcept, Plaquenil) but has had recent recurrence of neuro sx in last few months and interested in evaluation.    Referred to Neurology 3/28/22 (Briseyda) - unable to schedule prior to October 2022      #Hx of Seizure - reports 1 prior seizure; Not on AEDs  Reports occurrence of Seizure around time of initial Lupus diagnosis (Late 2018)  Record review EEG 10/18/18: Normal EEG recorded during the awake state.  No epileptiform discharges or focal abnormality was seen. This does not preclude a diagnosis of epilepsy.       #mild left sinus disease - incidental finding on recent MRI brain  Reviewed optimal use of OTC agents for symptom control and provided with instructions. Instructed to start daily claritin and flonase at least two weeks and monitor for improvement in symptoms. If limited improvement, could consider short abx course. #mild persistent asthma vs mild intermittent  Has not been using Albuterol and Flovent, has had difficulty with spacer. Reviewed instructions and optimal use to improve symptoms. Given difficulty with inhalers, encouraged to find nebulizer machine to utilize nebulizer treatments when having acute illness.    Follow up if use of Albuterol inhaler increases to twice weekly or more. Established with Pulmonology;  Encouraged to follow up and pursue sleep study to r/o MADDY contributing to sx (STOP Bang screen 3, high risk)                             Key COPD Medications                 albuterol (PROVENTIL HFA, VENTOLIN HFA, PROAIR HFA) 90 mcg/actuation inhaler (Taking) Take 2 Puffs by inhalation every four (4) hours as needed.     albuterol (PROVENTIL VENTOLIN) 2.5 mg /3 mL (0.083 %) nebu 1.5 mL by Nebulization route every four (4) hours as needed for Wheezing, Shortness of Breath or Respiratory Distress.     Flovent HFA 44 mcg/actuation inhaler Take 2 Puffs by inhalation two (2) times a day.          #Anxiety about health - uncontrolled  With #Panic attacks   Reviewed risk/benefit of both preventative daily treatment and rescue (prn) medication.      Failed treatments:   · Felt \"zombie\" like with Zoloft (3mo of use).    · Xanax helps with sx but reports is too sedating even with use of 1/2 of 0.25mg tablet. · CBD gummies help with sx but too sedating.     Current regimen and adjustments:   · Fearful of daily preventative medication but now agreeable to try non SSRI, plan to start low dose Duloxetine with slow titration to monitor for AE to help reduce need for rescue agent. (has been on ~1wk)  ·  Lorazepam as rescue agent (previously used as AED and believes tolerated well). Goal to use less than twice weekly on average. Reviewed to avoid prolonged regular use >6wks to prevent dependence.     #Acute DVT of LLE in setting of #Lupus   #Anticoagulated on Xarelto  Established with Rheumatology for Lupus monitoring   Taking cellcept and plaquenil      #HLD - goal <100; Newly at goal!  Reviewed Statin hx, unclear if needed but has not been using regularly. Interested to stop using given Patient's goal to reduce pill burden and continue with monitoring and focusing on management with heart healthy habits.                Lab Results   Component Value Date/Time     LDL, calculated 88 2022 01:21 PM      #Vit D Def - reviewed dx and recommended supplement dosing    Orders Placed This Encounter    fluticasone propionate (FLONASE) 50 mcg/actuation nasal spray     Si Sprays by Both Nostrils route daily. Dispense:  1 Each     Refill:  1    loratadine (CLARITIN) 10 mg tablet     Sig: loratadine 10 mg tablet  Take 1 tablet every day by oral route     Dispense:  90 Tablet     Refill:  1       Return in about 3 months (around 2022) for 30 min follow up, bring all meds. SUBJECTIVE/OBJECTIVE:  Last PCP visit: 2022    Since last visit:   Any changes in health, procedures, hospital visits, or specialist visits? See A/P  Tolerating medications without adverse reactions? Reports intermittent compliance with Rx without notable adverse effects.       Current Outpatient Medications   Medication Sig    fluticasone propionate (FLONASE) 50 mcg/actuation nasal spray 2 Sprays by Both Nostrils route daily.  loratadine (CLARITIN) 10 mg tablet loratadine 10 mg tablet  Take 1 tablet every day by oral route    FLUoxetine (PROzac) 10 mg capsule Take 1 Capsule by mouth daily for 30 days. May increase dose of next Rx if tolerating well.  famotidine (PEPCID) 20 mg tablet Take 20 mg by mouth two (2) times a day.  LORazepam (ATIVAN) 0.5 mg tablet Take 1 Tablet by mouth daily as needed for Anxiety.  albuterol (PROVENTIL VENTOLIN) 2.5 mg /3 mL (0.083 %) nebu 1.5 mL by Nebulization route every four (4) hours as needed for Wheezing, Shortness of Breath or Respiratory Distress.  rivaroxaban (Xarelto) 20 mg tab tablet Take 1 Tablet by mouth daily.  hydroxychloroquine (PLAQUENIL) 200 mg tablet Take 200 mg by mouth daily.  sucralfate (CARAFATE) 100 mg/mL suspension Take 1,000 mg by mouth. (Patient not taking: Reported on 4/27/2022)    ondansetron (ZOFRAN ODT) 4 mg disintegrating tablet Take 4 mg by mouth every eight (8) hours as needed. (Patient not taking: Reported on 4/27/2022)    Flovent HFA 44 mcg/actuation inhaler Take 2 Puffs by inhalation two (2) times a day. (Patient not taking: Reported on 4/27/2022)    albuterol (PROVENTIL HFA, VENTOLIN HFA, PROAIR HFA) 90 mcg/actuation inhaler Take 2 Puffs by inhalation every four (4) hours as needed. (Patient not taking: Reported on 4/27/2022)    mycophenolate (CELLCEPT) 500 mg tablet Take 1 Tab by mouth two (2) times a day. (Patient not taking: Reported on 4/14/2022)     No current facility-administered medications for this visit. There were no vitals taken for this visit. Physical Exam  Nursing note reviewed. Constitutional:       General: She is not in acute distress. Pulmonary:      Effort: Pulmonary effort is normal. No respiratory distress.    Neurological:      Mental Status: She is alert and oriented to person, place, and time. Psychiatric:         Mood and Affect: Mood normal.         Behavior: Behavior normal.         Ladi Gil, who was evaluated through a synchronous (real-time) audio-video encounter, and/or her healthcare decision maker, is aware that it is a billable service, which includes applicable co-pays, with coverage as determined by her insurance carrier. She provided verbal consent to proceed and patient identification was verified. This visit was conducted pursuant to the emergency declaration under the [address] [place] [address] authority and the [employer] and [employer] General Act. A caregiver was present when appropriate. Ability to conduct physical exam was limited. The patient was located at home in a state where the provider was licensed to provide care.      Colleen Aleman DO  Family Medicine  04/27/2022

## 2022-04-27 NOTE — PROGRESS NOTES
For virtual visit patient would like to receive link via TEXT: [phone]    Monica Munoz is a 19 y.o. female (: 2000) evaluated via telephone on 2022 to address:    Chief Complaint   Patient presents with    Results     MRI Results       No flowsheet data found. Allergies Reviewed. Learning Assessment:     Learning Assessment 2021   PRIMARY LEARNER Patient   HIGHEST LEVEL OF EDUCATION - PRIMARY LEARNER  -   BARRIERS PRIMARY LEARNER -   CO-LEARNER CAREGIVER -   PRIMARY LANGUAGE ENGLISH   LEARNER PREFERENCE PRIMARY DEMONSTRATION     READING   ANSWERED BY patient    RELATIONSHIP SELF     Depression Screening:     3 most recent PHQ Screens 2022   Little interest or pleasure in doing things Several days   Feeling down, depressed, irritable, or hopeless Several days   Total Score PHQ 2 2   Trouble falling or staying asleep, or sleeping too much Several days   Feeling tired or having little energy Nearly every day   Poor appetite, weight loss, or overeating Several days   Feeling bad about yourself - or that you are a failure or have let yourself or your family down Not at all   Trouble concentrating on things such as school, work, reading, or watching TV Not at all   Moving or speaking so slowly that other people could have noticed; or the opposite being so fidgety that others notice More than half the days   Thoughts of being better off dead, or hurting yourself in some way Not at all   PHQ 9 Score 9   How difficult have these problems made it for you to do your work, take care of your home and get along with others Very difficult     Fall Risk Assessment:     Fall Risk Assessment, last 12 mths 2022   Able to walk? Yes   Fall in past 12 months? 0   Do you feel unsteady? 0   Are you worried about falling 0     Abuse Screening:     Abuse Screening Questionnaire 2022   Do you ever feel afraid of your partner?  N   Are you in a relationship with someone who physically or mentally threatens you? N   Is it safe for you to go home? Y     ADL Assessment:   No flowsheet data found. Coordination of Care Questionaire:   1. Have you been to the ER, urgent care clinic since your last visit? Hospitalized since your last visit? NO    2. Have you seen or consulted any other health care providers outside of the [address] [place] [address] since your last visit? Include any pap smears or colon screening. YES EGD 4/27/22 w/ Dr. María Baeza GI    Advanced Directive:   1. Do you have an Advanced Directive? NO    2. Would you like information on Advanced Directives?  NO no

## 2022-04-29 ENCOUNTER — VIRTUAL VISIT (OUTPATIENT)
Dept: NEUROLOGY | Age: 22
End: 2022-04-29
Payer: MEDICAID

## 2022-04-29 DIAGNOSIS — Z86.59 HISTORY OF PANIC ATTACKS: ICD-10-CM

## 2022-04-29 DIAGNOSIS — F41.9 ANXIETY: ICD-10-CM

## 2022-04-29 DIAGNOSIS — Z87.898 HISTORY OF SEIZURE: ICD-10-CM

## 2022-04-29 DIAGNOSIS — R41.9 COGNITIVE COMPLAINTS: Primary | ICD-10-CM

## 2022-04-29 PROCEDURE — 90791 PSYCH DIAGNOSTIC EVALUATION: CPT | Performed by: PSYCHOLOGIST

## 2022-04-29 NOTE — PROGRESS NOTES
[facility]  Neuroscience   [address] [place] [address]. [facility], [address]  Office:  [phone]  Fax: [phone]                  Initial Office Exam  Patient Name: Ulises Velasquez  Age: 19 y.o. Gender: female   Handedness: right handed   Presenting Concern: cognitive complaints; anxiety  Primary Care Physician: Nahum King DO  Referring Provider: Nahum King DO      REASON FOR REFERRAL:  This comprehensive and medically necessary neuropsychological assessment was requested to assist a differential diagnosis of cognitive and psychological concerns. The use and purpose of this examination, as well as the extent and limitations of confidentiality, were explained prior to obtaining permission to participate. Instructions were provided regarding the necessity to put forth optimal effort and answer questions truthfully in order to obtain reliable and accurate test results. REVIEW OF RECORDS:  Ms. Elma Bernal was referred by her PCP for work-up of brain fog. A history of panic attacks is noted. A brain MRI on 4/25/2022 showed no acute infarct, hemorrhage, mass-effect, or herniation. Lupus is suspected. History is significant for one seizure which occurred on 10/18/2018. A normal EEG followed. Duloxetine has been started for anxiety; lorazepam is used for rescue. Current Outpatient Medications   Medication Sig    fluticasone propionate (FLONASE) 50 mcg/actuation nasal spray 2 Sprays by Both Nostrils route daily.  loratadine (CLARITIN) 10 mg tablet loratadine 10 mg tablet  Take 1 tablet every day by oral route    FLUoxetine (PROzac) 10 mg capsule Take 1 Capsule by mouth daily for 30 days. May increase dose of next Rx if tolerating well.  famotidine (PEPCID) 20 mg tablet Take 20 mg by mouth two (2) times a day.  sucralfate (CARAFATE) 100 mg/mL suspension Take 1,000 mg by mouth.  (Patient not taking: Reported on 4/27/2022)    ondansetron [facility] ODT) 4 mg disintegrating tablet Take 4 mg by mouth every eight (8) hours as needed. (Patient not taking: Reported on 4/27/2022)    LORazepam (ATIVAN) 0.5 mg tablet Take 1 Tablet by mouth daily as needed for Anxiety.  albuterol (PROVENTIL VENTOLIN) 2.5 mg /3 mL (0.083 %) nebu 1.5 mL by Nebulization route every four (4) hours as needed for Wheezing, Shortness of Breath or Respiratory Distress.  rivaroxaban (Xarelto) 20 mg tab tablet Take 1 Tablet by mouth daily.  Flovent HFA 44 mcg/actuation inhaler Take 2 Puffs by inhalation two (2) times a day. (Patient not taking: Reported on 4/27/2022)    albuterol (PROVENTIL HFA, VENTOLIN HFA, PROAIR HFA) 90 mcg/actuation inhaler Take 2 Puffs by inhalation every four (4) hours as needed. (Patient not taking: Reported on 4/27/2022)    mycophenolate (CELLCEPT) 500 mg tablet Take 1 Tab by mouth two (2) times a day. (Patient not taking: Reported on 4/14/2022)    hydroxychloroquine (PLAQUENIL) 200 mg tablet Take 200 mg by mouth daily. No current facility-administered medications for this visit. Past Medical History:   Diagnosis Date    Acid reflux     Lupus ([facility] [address]     Lupus ([facility] [address] 09/2018         Past Surgical History:   Procedure Laterality Date    HX TONSIL AND ADENOIDECTOMY      HX TONSILLECTOMY      HX WISDOM TEETH EXTRACTION         CLINICAL INTERVIEW:  Ms. Gayla Shelby was unaccompanied for her virtual visit. Consistent with records, she reported brain fog which first emerged 12 months ago. The course has been variable since that time. Developmental history is unremarkable. Ms. Gayla Shelby was born on time and proceeded to meet developmental milestones as expected. Neurologic history is significant for 1 seizure which occurred while Ms. Gayla Shelby was hospitalized. There is no history of stroke, syncope, or significant head trauma. Sleep disturbance includes occasional difficulties with sleep onset and maintenance. Snoring was denied.   Daytime fatigue was described as occasional.  Pain complaints were denied. Alcohol use was described as occasional.  There is no tobacco or illicit substance use. Family history of neurologic illness is significant for ADHD and seizures in a cousin. With regard to emotional functioning, Ms. Rosana Velez stated that she first began experiencing anxiety 2 years ago. It has worsened in severity since that time. She is currently taking Prozac and lorazepam, the latter of which she takes 1-2 times per week. Ms. Rosana Velez has a history of transient suicidal ideation prior to being diagnosed with lupus. At the time of this interview, she adamantly denied suicidal ideation, plan, and intent. She has a history of 1 psychiatric hospitalization prior to being diagnosed with lupus. She is unsure why this admission was necessary but believes she may have had paranoia at the time. With regard to psychotic symptoms, Ms. Rosana Velez stated that she will sometimes wake up and perceive a dark figure. At that time, she is unable to get up from her bed. Self-harm behaviors and psychological trauma were denied. Socially, Ms. Rosana Velez has never been . She has no children and lives with her parents. She exercises intermittently. Her workouts have recently been disrupted due to her medical appointments. Hobbies include cooking, singing, and outings with friends and family. Friend and family network of support were described as good. 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 She is in her first year, having started classes in February. She had no IEP in place during high school but did have specialized help in the area of English, comprehension, and reading. Vocationally, Ms. Rosana Velez is employed part-time at [employer] Functionally, Ms. Rosana Velez remains independent for ADL and IADL care. She maintains a 's permit but is never held a license.     MENTAL STATUS:    Sensorium  Awake, Aware, Alert Orientation person, place, time/date, situation, day of week, month of year and year   Relations cooperative   Eye Contact appropriate   Appearance:  age appropriate   Motor Behavior:  within normal limits   Speech:  normal pitch and normal volume   Vocabulary average   Thought Process: within normal limits   Thought Content free of delusions and free of hallucinations   Suicidal ideations none   Homicidal ideations none   Mood:  euthymic   Affect:  mood-congruent   Memory recent  adequate   Memory remote:  adequate   Concentration:  adequate   Abstraction:  abstract   Insight:  fair   Reliability fair   Judgment:  fair         DIAGNOSTIC IMPRESSIONS:  1. R/O ADHD versus MCI  2. Anxiety/Panic Attacks  3. H/O Seizure      PLAN:  1. Complete a comprehensive neuropsychological assessment to provide a differential diagnosis of presenting concerns as well as to assist with disposition and treatment planning as appropriate. 2. Consider compensatory and remedial cognitive training. 3. Consider nonpharmacological interventions for mood disorder. 76831 x 1 Review of records. Face to face interview w/ patient. Determine test protocol: 60 minutes. Total 1 unit      Fanny Vo, PHD  Licensed Clinical Psychologist    This note was created using voice recognition software. Despite editing, there may be syntax errors. Ethan Mendez is a 19 y.o. female who was evaluated by an audio-video encounter for concerns as above. Patient identification was verified prior to start of the visit. Pursuant to the emergency declaration under the [facility] waiver authority and the [employer] Act, this Virtual Visit was conducted, with patient's (and/or legal guardian's) consent, to reduce the patient's risk of exposure to COVID-19 and provide necessary medical care.      Services were provided through a synchronous discussion virtually to substitute for in-person clinic visit. I was at home. The patient was at home.

## 2022-05-03 ENCOUNTER — VIRTUAL VISIT (OUTPATIENT)
Dept: FAMILY MEDICINE CLINIC | Age: 22
End: 2022-05-03
Payer: MEDICAID

## 2022-05-03 DIAGNOSIS — M32.9 SYSTEMIC LUPUS ERYTHEMATOSUS, UNSPECIFIED SLE TYPE, UNSPECIFIED ORGAN INVOLVEMENT STATUS (HCC): ICD-10-CM

## 2022-05-03 DIAGNOSIS — F41.8 ANXIETY ABOUT HEALTH: ICD-10-CM

## 2022-05-03 DIAGNOSIS — N92.3 SPOTTING BETWEEN MENSES: Primary | ICD-10-CM

## 2022-05-03 DIAGNOSIS — Z79.01 ANTICOAGULATED: ICD-10-CM

## 2022-05-03 DIAGNOSIS — I82.4Y2 ACUTE DEEP VEIN THROMBOSIS (DVT) OF PROXIMAL VEIN OF LEFT LOWER EXTREMITY (HCC): ICD-10-CM

## 2022-05-03 DIAGNOSIS — J45.30 MILD PERSISTENT ASTHMA WITHOUT COMPLICATION: ICD-10-CM

## 2022-05-03 DIAGNOSIS — K22.2 ESOPHAGEAL STENOSIS: ICD-10-CM

## 2022-05-03 DIAGNOSIS — K21.00 GASTROESOPHAGEAL REFLUX DISEASE WITH ESOPHAGITIS WITHOUT HEMORRHAGE: ICD-10-CM

## 2022-05-03 DIAGNOSIS — F41.0 PANIC ATTACKS: ICD-10-CM

## 2022-05-03 DIAGNOSIS — R41.89 BRAIN FOG: ICD-10-CM

## 2022-05-03 DIAGNOSIS — K29.40 ATROPHIC GASTRITIS WITHOUT HEMORRHAGE: ICD-10-CM

## 2022-05-03 DIAGNOSIS — J32.0 LEFT MAXILLARY SINUSITIS: ICD-10-CM

## 2022-05-03 PROCEDURE — 99214 OFFICE O/P EST MOD 30 MIN: CPT | Performed by: STUDENT IN AN ORGANIZED HEALTH CARE EDUCATION/TRAINING PROGRAM

## 2022-05-03 RX ORDER — AZITHROMYCIN 250 MG/1
TABLET, FILM COATED ORAL
Qty: 6 TABLET | Refills: 0 | Status: SHIPPED | OUTPATIENT
Start: 2022-05-03 | End: 2022-07-19 | Stop reason: ALTCHOICE

## 2022-05-03 NOTE — PROGRESS NOTES
Ladi Gil (: 2000) is a 19 y.o. female, established patient, here for:    ASSESSMENT/PLAN:    ICD-10-CM ICD-9-CM   1. Spotting between menses  N92.3 626.6   2. Esophageal stenosis  K22.2 530.3   3. Gastroesophageal reflux disease with esophagitis without hemorrhage  K21.00 530.81     530.10   4. Atrophic gastritis without hemorrhage  K29.40 535.10   5. Left maxillary sinusitis  J32.0 473.0   6. Anxiety about health  F41.8 300.09   7. Systemic lupus erythematosus, unspecified SLE type, unspecified organ involvement status ([facility] [address]  M32.9 710.0   8. Acute deep vein thrombosis (DVT) of proximal vein of left lower extremity (HCC)  I82.4Y2 453.41   9. Anticoagulated  Z79.01 V58.61   10. Brain fog  R41.89 799.59   11. Mild persistent asthma without complication  C18.44 168.86   12. Panic attacks  F41.0 300.01     #Anxiety about health - uncontrolled  With #Panic attacks   Reviewed risk/benefit of both preventative daily treatment and rescue (prn) medication.      Failed treatments:   · Felt \"zombie\" like with Zoloft (3mo of use).    · Xanax helps with sx but reports is too sedating even with use of 1/2 of 0.25mg tablet. · CBD gummies help with sx but too sedating.     Current regimen and adjustments:   · Fearful of daily preventative medication but now agreeable to try non SSRI, plan to start low dose Duloxetine with slow titration to monitor for AE to help reduce need for rescue agent. (has been on ~1wk)  ·  Lorazepam as rescue agent (previously used as AED and believes tolerated well). Goal to use less than twice weekly on average. Reviewed to avoid prolonged regular use >6wks to prevent dependence.     #Bleeding between menses  Started bleeding after intercourse, with stomach pain, few minutes then resolved  FDLMP - - lasts 6-7 days     #GERD with esophagitis  And #Esophageal stenosis s/p dilation  And #Atrophic gastritis  Rec review of EGD 22 with \"LA Grade A reflux esophagitis.  Rule out Dutta's esophagus. Biopsied. Benign-appearing esophageal stenosis. Dilated. Atrophic gastritis. Biopsied. Normal examined duodenum\" and plan to increase Omeprazole to 40mg and for gastric emptying study and repeat EGD prn for retreatment     Primary concern of #Brain fog - unclear etiology   Initial lab workup without contributing factors  Record review MRI Brain 10/18/18 -Alcario Methuen looks normal. Increased number of small left intraparotid and retroauricular lymph nodes, nonspecific but likely benign and reactive.   MRI brain 4/25/22 with no acute infarct, hemorrhage, mass effect, or herniation. Rec review Neuropsych eval 4/29/22  1. R/O ADHD versus MCI  2. Anxiety/Panic Attacks  3. H/O Seizure    #Under suspicion for Lupus Cerebritis  When first diagnosed with Lupus (Late 2018), was having significant migraines (daily), hyperpigmented rash on arms/face, and fatigue - also notes significant memory loss regarding this time of life. Reports initial symptoms improved after starting Lupus treatment (Cellcept, Plaquenil) but has had recent recurrence of neuro sx in last few months and interested in evaluation.  No longer on Cellcept. Referred to Neurology 3/28/22 (Briseyda) - unable to schedule prior to October 2022      #Hx of Seizure - reports 1 prior seizure; Not on AEDs  Reports occurrence of Seizure around time of initial Lupus diagnosis (Late 2018)  Record review EEG 10/18/18: Normal EEG recorded during the awake state.  No epileptiform discharges or focal abnormality was seen. This does not preclude a diagnosis of epilepsy.       #mild left sinus disease - incidental finding on recent MRI brain  Reviewed optimal use of OTC agents for symptom control and provided with instructions. Instructed to start daily claritin and flonase at least two weeks and monitor for improvement in symptoms.  If limited improvement, could consider short abx course.      #mild persistent asthma vs mild intermittent  Has not been Facility-Administered Medications   Medication Dose Route Frequency Provider Last Rate Last Dose    melatonin tablet 3 mg  3 mg Oral Nightly PRN [facility]   3 mg at 02/19/20 0024    fluticasone (FLONASE) 50 MCG/ACT nasal spray 1 spray  1 spray Each Nostril Daily Nichelle Ricardo MD   1 spray at 02/18/20 1502    cetirizine (ZYRTEC) tablet 5 mg  5 mg Oral Daily Natali Oropeza MD   5 mg at 02/19/20 0946    sodium chloride flush 0.9 % injection 10 mL  10 mL Intravenous 2 times per day Nichelle Ricardo MD        sodium chloride flush 0.9 % injection 10 mL  10 mL Intravenous PRN Natali Oropeza MD        magnesium hydroxide (MILK OF MAGNESIA) 400 MG/5ML suspension 30 mL  30 mL Oral Daily PRN Natali Oropeza MD        ondansetron [facility]) injection 4 mg  4 mg Intravenous Q6H PRN Natali Oropeza MD   4 mg at 02/18/20 2330    enoxaparin (LOVENOX) injection 40 mg  40 mg Subcutaneous Daily Nichelle Ricardo MD   40 mg at 02/19/20 0946    potassium chloride (KLOR-CON M) extended release tablet 40 mEq  40 mEq Oral PRN Natali Oropeza MD   40 mEq at 02/19/20 3976    Or    potassium bicarb-citric acid (EFFER-K) effervescent tablet 40 mEq  40 mEq Oral PRN Natali Oropeza MD        Or    potassium chloride 10 mEq/100 mL IVPB (Peripheral Line)  10 mEq Intravenous PRN Natali Oropeza MD        magnesium sulfate 1 g in dextrose 5 % 100 mL IVPB  1 g Intravenous PRN Nichelle Ricardo MD        0.9 % sodium chloride infusion   Intravenous Continuous Natali [address], [place] 75 mL/hr at 02/19/20 0437      piperacillin-tazobactam (ZOSYN) 3.375 g in dextrose 5 % 50 mL IVPB extended infusion (mini-bag)  3.375 g Intravenous [address], [place] 12.5 mL/hr at 02/19/20 0656 3.375 g at 02/19/20 0656    lisinopril (PRINIVIL;ZESTRIL) tablet 20 mg  20 mg Oral Daily Natali Oropeza MD   20 mg at 02/19/20 0945    amLODIPine (NORVASC) tablet 5 mg  5 mg Oral Daily Natali Oropeza MD   5 mg at 02/19/20 0946    morphine (PF) injection 1 mg  1 mg Intravenous Q3H PRN [place]   1 mg at 02/19/20 0540       Allergies:  No Known Allergies    Problem List:    Patient Active Problem List   Diagnosis Code    Colitis K52.9       Past Medical History:        Diagnosis Date    Diabetes mellitus ([facility] [address]     GERD (gastroesophageal reflux disease)     Hypertension        Past Surgical History:        Procedure Laterality Date    CARDIAC CATHETERIZATION      TUBAL LIGATION         Social History:    Social History     Tobacco Use    Smoking status: Passive Smoke Exposure - Never Smoker    Smokeless tobacco: Never Used   Substance Use Topics    Alcohol use: Yes     Comment: social                                Counseling given: Not Answered      Vital Signs (Current):   Vitals:    02/18/20 2330 02/19/20 0332 02/19/20 0815 02/19/20 1035   BP: (!) 176/85 139/67 130/65 (!) 186/90   Pulse: 80 68 81 90   Resp: 18 18 16 23   Temp: 36.1 °C (97 °F) 36.5 °C (97.7 °F) 37.1 °C (98.7 °F)    TempSrc: Oral Oral Oral    SpO2:  94% 97% 99%   Weight:       Height:                                                  BP Readings from Last 3 Encounters:   02/19/20 (!) 186/90   02/17/20 138/81   02/10/20 138/72       NPO Status: Time of last liquid consumption: 0930                        Time of last solid consumption: 1800                        Date of last liquid consumption: 02/19/20                        Date of last solid food consumption: 02/17/20    BMI:   Wt Readings from Last 3 Encounters:   02/18/20 163 lb 12.8 oz (74.3 kg)   02/17/20 165 lb (74.8 kg) using Albuterol and Flovent, has had difficulty with spacer. Reviewed instructions and optimal use to improve symptoms. Given difficulty with inhalers, encouraged to find nebulizer machine to utilize nebulizer treatments when having acute illness.    Follow up if use of Albuterol inhaler increases to twice weekly or more.   Established with Pulmonology; Encouraged to follow up and pursue sleep study to r/o MADDY contributing to sx (STOP Bang screen 3, high risk)                             Key COPD Medications                 albuterol (PROVENTIL HFA, VENTOLIN HFA, PROAIR HFA) 90 mcg/actuation inhaler (Taking) Take 2 Puffs by inhalation every four (4) hours as needed.     albuterol (PROVENTIL VENTOLIN) 2.5 mg /3 mL (0.083 %) nebu 1.5 mL by Nebulization route every four (4) hours as needed for Wheezing, Shortness of Breath or Respiratory Distress.     Flovent HFA 44 mcg/actuation inhaler Take 2 Puffs by inhalation two (2) times a day.          #Acute DVT of LLE in setting of #Lupus   #Anticoagulated on Xarelto  Established with Rheumatology for Lupus monitoring   Taking plaquenil; Cellcept discontinued     #HLD - goal <100; Newly at goal!  Reviewed Statin hx, unclear if needed but has not been using regularly. Interested to stop using given Patient's goal to reduce pill burden and continue with monitoring and focusing on management with heart healthy habits.                Lab Results   Component Value Date/Time     LDL, calculated 88 04/04/2022 01:21 PM      #Vit D Def - reviewed dx and recommended supplement dosing    Orders Placed This Encounter    azithromycin (ZITHROMAX) 250 mg tablet     Sig: Take 2 tablets PO today, then take 1 tablet PO daily     Dispense:  6 Tablet     Refill:  0     Return in about 2 months (around 7/3/2022) for 30 min follow up. SUBJECTIVE/OBJECTIVE:  Last PCP visit: 4/27/2022    Since last visit:   Any changes in health, procedures, hospital visits, or specialist visits?  See 02/10/20 165 lb (74.8 kg)     Body mass index is 28.12 kg/m². CBC:   Lab Results   Component Value Date    WBC 9.7 02/19/2020    RBC 3.87 02/19/2020    HGB 11.9 02/19/2020    HCT 35.2 02/19/2020    MCV 91.0 02/19/2020     02/19/2020       CMP:   Lab Results   Component Value Date     02/19/2020    K 3.3 02/19/2020     02/19/2020    CO2 23 02/19/2020    BUN 6 02/19/2020    CREATININE 0.5 02/19/2020    LABGLOM >90 02/19/2020    GLUCOSE 172 02/19/2020    PROT 7.8 02/18/2020    CALCIUM 8.3 02/19/2020    BILITOT 0.3 02/18/2020    ALKPHOS 99 02/18/2020    AST 21 02/18/2020    ALT 26 02/18/2020       POC Tests: No results for input(s): POCGLU, POCNA, POCK, POCCL, POCBUN, POCHEMO, POCHCT in the last 72 hours. Coags:   Lab Results   Component Value Date    INR 0.98 02/18/2020    APTT 34.2 02/18/2020       HCG (If Applicable): No results found for: PREGTESTUR, PREGSERUM, HCG, HCGQUANT     ABGs: No results found for: PHART, PO2ART, UNF0HRE, DTB6QVP, BEART, U6MWJTAA     Type & Screen (If Applicable):  No results found for: LABABO, [address]    Anesthesia Evaluation  Patient summary reviewed and Nursing notes reviewed no history of anesthetic complications:   Airway: Mallampati: II  TM distance: >3 FB   Neck ROM: full  Mouth opening: > = 3 FB Dental: normal exam         Pulmonary:                             ROS comment: Marijuana use     Cardiovascular:    (+) hypertension:,       ECG reviewed               Beta Blocker:  Not on Beta Blocker         Neuro/Psych:   Negative Neuro/Psych ROS              GI/Hepatic/Renal:   (+) GERD:,           Endo/Other:    (+) DiabetesType II DM, , .                 Abdominal:           Vascular: negative vascular ROS. Anesthesia Plan      MAC     ASA 2       Induction: intravenous. Anesthetic plan and risks discussed with patient. Plan discussed with attending.                   IGNACIO Cordero - FELIPE A/P  Tolerating medications without adverse reactions? Reports good compliance with Rx without notable adverse effects. Current Outpatient Medications   Medication Sig    azithromycin (ZITHROMAX) 250 mg tablet Take 2 tablets PO today, then take 1 tablet PO daily    fluticasone propionate (FLONASE) 50 mcg/actuation nasal spray 2 Sprays by Both Nostrils route daily.  loratadine (CLARITIN) 10 mg tablet loratadine 10 mg tablet  Take 1 tablet every day by oral route    FLUoxetine (PROzac) 10 mg capsule Take 1 Capsule by mouth daily for 30 days. May increase dose of next Rx if tolerating well.  famotidine (PEPCID) 20 mg tablet Take 20 mg by mouth two (2) times a day.  sucralfate (CARAFATE) 100 mg/mL suspension Take 1,000 mg by mouth. (Patient not taking: Reported on 4/27/2022)    ondansetron (ZOFRAN ODT) 4 mg disintegrating tablet Take 4 mg by mouth every eight (8) hours as needed. (Patient not taking: Reported on 4/27/2022)    LORazepam (ATIVAN) 0.5 mg tablet Take 1 Tablet by mouth daily as needed for Anxiety.  albuterol (PROVENTIL VENTOLIN) 2.5 mg /3 mL (0.083 %) nebu 1.5 mL by Nebulization route every four (4) hours as needed for Wheezing, Shortness of Breath or Respiratory Distress.  rivaroxaban (Xarelto) 20 mg tab tablet Take 1 Tablet by mouth daily.  Flovent HFA 44 mcg/actuation inhaler Take 2 Puffs by inhalation two (2) times a day. (Patient not taking: Reported on 4/27/2022)    albuterol (PROVENTIL HFA, VENTOLIN HFA, PROAIR HFA) 90 mcg/actuation inhaler Take 2 Puffs by inhalation every four (4) hours as needed. (Patient not taking: Reported on 4/27/2022)    hydroxychloroquine (PLAQUENIL) 200 mg tablet Take 200 mg by mouth daily. No current facility-administered medications for this visit. There were no vitals taken for this visit. Physical Exam  Nursing note reviewed. Constitutional:       General: She is not in acute distress.   Pulmonary:      Effort: Pulmonary effort is normal. No respiratory distress. Neurological:      Mental Status: She is alert and oriented to person, place, and time. Psychiatric:         Mood and Affect: Mood is anxious. Behavior: Behavior normal.             Ladi Gil, who was evaluated through a synchronous (real-time) audio-video encounter, and/or her healthcare decision maker, is aware that it is a billable service, which includes applicable co-pays, with coverage as determined by her insurance carrier. She provided verbal consent to proceed and patient identification was verified. This visit was conducted pursuant to the emergency declaration under the [address] [place] [address], [address] [place] [address] authority and the [employer] and [employer] General Act. A caregiver was present when appropriate. Ability to conduct physical exam was limited. The patient was located at home in a state where the provider was licensed to provide care.      Shona Martinez DO  Family Medicine  05/03/2022

## 2022-05-05 ENCOUNTER — TELEPHONE (OUTPATIENT)
Dept: FAMILY MEDICINE CLINIC | Age: 22
End: 2022-05-05

## 2022-05-05 DIAGNOSIS — Z79.899 USES NEBULIZER AND INHALER AT HOME: ICD-10-CM

## 2022-05-05 DIAGNOSIS — J45.30 MILD PERSISTENT ASTHMA WITHOUT COMPLICATION: Primary | ICD-10-CM

## 2022-05-05 NOTE — TELEPHONE ENCOUNTER
----- Message from [facility] sent at 5/5/2022 11:01 AM EDT -----  Subject: Medication Problem    QUESTIONS  Name of Medication? Flovent HFA 44 mcg/actuation inhaler  Patient-reported dosage and instructions? daily  What question or problem do you have with the medication? Pt has questions   regarding this medication. She wants to know if it comes in a medication   form that she can use with the nebulizer. Requesting call back to discuss. Preferred Pharmacy? [facility],   [facility] phone number (if available)? [phone]  Additional Information for Provider?   ---------------------------------------------------------------------------  --------------  [address]  What is the best way for the office to contact you? OK to leave message on   voicemail  Preferred Call Back Phone Number? [phone]  ---------------------------------------------------------------------------  --------------  SCRIPT ANSWERS  Relationship to Patient?  Self

## 2022-05-10 ENCOUNTER — VIRTUAL VISIT (OUTPATIENT)
Dept: FAMILY MEDICINE CLINIC | Age: 22
End: 2022-05-10
Payer: MEDICAID

## 2022-05-10 DIAGNOSIS — Z79.899 USES NEBULIZER AND INHALER AT HOME: ICD-10-CM

## 2022-05-10 DIAGNOSIS — Z79.01 ANTICOAGULATED: ICD-10-CM

## 2022-05-10 DIAGNOSIS — K22.2 ESOPHAGEAL STENOSIS: ICD-10-CM

## 2022-05-10 DIAGNOSIS — K21.00 GASTROESOPHAGEAL REFLUX DISEASE WITH ESOPHAGITIS WITHOUT HEMORRHAGE: ICD-10-CM

## 2022-05-10 DIAGNOSIS — F41.0 PANIC ATTACKS: ICD-10-CM

## 2022-05-10 DIAGNOSIS — I82.4Y2 ACUTE DEEP VEIN THROMBOSIS (DVT) OF PROXIMAL VEIN OF LEFT LOWER EXTREMITY (HCC): ICD-10-CM

## 2022-05-10 DIAGNOSIS — F41.8 ANXIETY ABOUT HEALTH: Primary | ICD-10-CM

## 2022-05-10 DIAGNOSIS — M32.9 SYSTEMIC LUPUS ERYTHEMATOSUS, UNSPECIFIED SLE TYPE, UNSPECIFIED ORGAN INVOLVEMENT STATUS (HCC): ICD-10-CM

## 2022-05-10 DIAGNOSIS — R41.89 BRAIN FOG: ICD-10-CM

## 2022-05-10 DIAGNOSIS — J45.30 MILD PERSISTENT ASTHMA WITHOUT COMPLICATION: ICD-10-CM

## 2022-05-10 DIAGNOSIS — K29.40 ATROPHIC GASTRITIS WITHOUT HEMORRHAGE: ICD-10-CM

## 2022-05-10 PROCEDURE — 99214 OFFICE O/P EST MOD 30 MIN: CPT | Performed by: STUDENT IN AN ORGANIZED HEALTH CARE EDUCATION/TRAINING PROGRAM

## 2022-05-10 NOTE — PROGRESS NOTES
Ladi Gil (: 2000) is a 19 y.o. female, established patient, here for:    ASSESSMENT/PLAN:    ICD-10-CM ICD-9-CM   1. Anxiety about health  F41.8 300.09   2. Mild persistent asthma without complication  Q56.88 250.63   3. Uses nebulizer and inhaler at home  Z79.899 V58.69   4. Gastroesophageal reflux disease with esophagitis without hemorrhage  K21.00 530.81     530.10   5. Esophageal stenosis  K22.2 530.3   6. Atrophic gastritis without hemorrhage  K29.40 535.10   7. Systemic lupus erythematosus, unspecified SLE type, unspecified organ involvement status ([facility] [address]  M32.9 710.0   8. Anticoagulated  Z79.01 V58.61   9. Acute deep vein thrombosis (DVT) of proximal vein of left lower extremity (HCC)  I82.4Y2 453.41   10. Panic attacks  F41.0 300.01   11. Brain fog  R41.89 799.59     #Anxiety about health - uncontrolled  With #Panic attacks   Reviewed risk/benefit of both preventative daily treatment and rescue (prn) medication.      Failed treatments:   · Felt \"zombie\" like with Zoloft (3mo of use).    · Xanax helps with sx but reports is too sedating even with use of 1/2 of 0.25mg tablet. · CBD gummies help with sx but too sedating. · Poorly tolerated Duloxetine and discontinued after short use     Current regimen and adjustments:   · Fearful of daily preventative medication out of concern for worsening emotional blunting,  plan to continue low dose Prozac with slow titration to monitor for AE to help reduce need for rescue agent. (has been on ~1.5wk)  ·  Lorazepam as rescue agent (previously used as AED and believes tolerated well). Goal to use less than twice weekly on average. Reviewed to avoid prolonged regular use >6wks to prevent dependence. Effective response in symptoms with one dose. · Established with Therapist     #GERD with esophagitis  And #Esophageal stenosis s/p dilation  And #Atrophic gastritis  Rec review of EGD 22 with \"LA Grade A reflux esophagitis. Rule out Dutta's esophagus. Biopsied. Benign-appearing esophageal stenosis. Dilated. Atrophic gastritis. Biopsied.  Normal examined duodenum\" and plan to increase Omeprazole to 40mg and for gastric emptying study and repeat EGD prn for retreatment     Primary concern of #Brain fog - unclear etiology   Initial lab workup without contributing factors  Record review MRI Brain 10/18/18 -Teresa Hasting looks normal. Increased number of small left intraparotid and retroauricular lymph nodes, nonspecific but likely benign and reactive.   MRI brain 4/25/22 with no acute infarct, hemorrhage, mass effect, or herniation. Rec review Neuropsych eval 4/29/22  1. R/O ADHD versus MCI  2. Anxiety/Panic Attacks  3. H/O Seizure     #Under suspicion for Lupus Cerebritis  When first diagnosed with Lupus (Late 2018), was having significant migraines (daily), hyperpigmented rash on arms/face, and fatigue - also notes significant memory loss regarding this time of life. Reports initial symptoms improved after starting Lupus treatment (Cellcept, Plaquenil) but has had recent recurrence of neuro sx in last few months and interested in evaluation.  No longer on Cellcept. Referred to Neurology 3/28/22 (Briseyda) - unable to schedule prior to October 2022      #Hx of Seizure - reports 1 prior seizure; Not on AEDs  Reports occurrence of Seizure around time of initial Lupus diagnosis (Late 2018)  Record review EEG 10/18/18: Normal EEG recorded during the awake state.  No epileptiform discharges or focal abnormality was seen. This does not preclude a diagnosis of epilepsy.       #mild left sinus disease - incidental finding on recent MRI brain  Reviewed optimal use of OTC agents for symptom control and provided with instructions. Instructed to start daily claritin and flonase at least two weeks and monitor for improvement in symptoms.  If limited improvement, could consider short abx course.      #mild persistent asthma vs mild intermittent  Has not been using Albuterol and Flovent, has had difficulty with spacer. Reviewed instructions and optimal use to improve symptoms. Given difficulty with inhalers, encouraged to find nebulizer machine to utilize nebulizer treatments when having acute illness.    Follow up if use of Albuterol inhaler increases to twice weekly or more.   Established with Pulmonology; Encouraged to follow up and pursue sleep study to r/o MADDY contributing to sx (STOP Bang screen 3, high risk)                             Key COPD Medications                 albuterol (PROVENTIL HFA, VENTOLIN HFA, PROAIR HFA) 90 mcg/actuation inhaler (Taking) Take 2 Puffs by inhalation every four (4) hours as needed.     albuterol (PROVENTIL VENTOLIN) 2.5 mg /3 mL (0.083 %) nebu 1.5 mL by Nebulization route every four (4) hours as needed for Wheezing, Shortness of Breath or Respiratory Distress.     Flovent HFA 44 mcg/actuation inhaler Take 2 Puffs by inhalation two (2) times a day.          #Acute DVT of LLE in setting of #Lupus   #Anticoagulated on Xarelto  Established with Rheumatology for Lupus monitoring   Taking plaquenil; Cellcept discontinued     #HLD - goal <100; Newly at goal!  Reviewed Statin hx, unclear if needed but has not been using regularly. Interested to stop using given Patient's goal to reduce pill burden and continue with monitoring and focusing on management with heart healthy habits.                Lab Results   Component Value Date/Time     LDL, calculated 88 04/04/2022 01:21 PM      #Vit D Def - reviewed dx and recommended supplement dosing    No orders of the defined types were placed in this encounter. Return in about 2 months (around 7/10/2022) for medication monitoring. SUBJECTIVE/OBJECTIVE:  Last PCP visit: 5/3/2022    Since last visit:   Any changes in health, procedures, hospital visits, or specialist visits? Denies  Tolerating medications without adverse reactions? Reports good compliance with Rx without notable adverse effects.       Current Outpatient Medications   Medication Sig    azithromycin (ZITHROMAX) 250 mg tablet Take 2 tablets PO today, then take 1 tablet PO daily    fluticasone propionate (FLONASE) 50 mcg/actuation nasal spray 2 Sprays by Both Nostrils route daily.  loratadine (CLARITIN) 10 mg tablet loratadine 10 mg tablet  Take 1 tablet every day by oral route    FLUoxetine (PROzac) 10 mg capsule Take 1 Capsule by mouth daily for 30 days. May increase dose of next Rx if tolerating well.  famotidine (PEPCID) 20 mg tablet Take 20 mg by mouth two (2) times a day.  sucralfate (CARAFATE) 100 mg/mL suspension Take 1,000 mg by mouth. (Patient not taking: Reported on 4/27/2022)    ondansetron (ZOFRAN ODT) 4 mg disintegrating tablet Take 4 mg by mouth every eight (8) hours as needed. (Patient not taking: Reported on 4/27/2022)    LORazepam (ATIVAN) 0.5 mg tablet Take 1 Tablet by mouth daily as needed for Anxiety.  albuterol (PROVENTIL VENTOLIN) 2.5 mg /3 mL (0.083 %) nebu 1.5 mL by Nebulization route every four (4) hours as needed for Wheezing, Shortness of Breath or Respiratory Distress.  rivaroxaban (Xarelto) 20 mg tab tablet Take 1 Tablet by mouth daily.  Flovent HFA 44 mcg/actuation inhaler Take 2 Puffs by inhalation two (2) times a day. (Patient not taking: Reported on 4/27/2022)    albuterol (PROVENTIL HFA, VENTOLIN HFA, PROAIR HFA) 90 mcg/actuation inhaler Take 2 Puffs by inhalation every four (4) hours as needed. (Patient not taking: Reported on 4/27/2022)    hydroxychloroquine (PLAQUENIL) 200 mg tablet Take 200 mg by mouth daily. No current facility-administered medications for this visit. There were no vitals taken for this visit. Physical Exam  Nursing note reviewed. Constitutional:       General: She is not in acute distress. Pulmonary:      Effort: Pulmonary effort is normal. No respiratory distress. Neurological:      Mental Status: She is alert and oriented to person, place, and time. Psychiatric:         Mood and Affect: Mood normal.         Behavior: Behavior normal.           Ladi Gil, who was evaluated through a synchronous (real-time) audio-video encounter, and/or her healthcare decision maker, is aware that it is a billable service, which includes applicable co-pays, with coverage as determined by her insurance carrier. She provided verbal consent to proceed and patient identification was verified. This visit was conducted pursuant to the emergency declaration under the [address] [place] [address], [address] [place] [address] authority and the "[employer] and [employer] General Act. A caregiver was present when appropriate. Ability to conduct physical exam was limited. The patient was located at home in a state where the provider was licensed to provide care.      José Luis Valentin DO  Family Medicine  05/10/2022

## 2022-05-12 ENCOUNTER — TELEPHONE (OUTPATIENT)
Dept: FAMILY MEDICINE CLINIC | Age: 22
End: 2022-05-12

## 2022-05-12 NOTE — TELEPHONE ENCOUNTER
Call received by myself (on call physician) regarding concern after having a glass of wine at dinner and fearful of interaction with daily antidepressant Prozac taken at 12pm earlier the same day as well as need to take blood thinner

## 2022-05-23 ENCOUNTER — TELEPHONE (OUTPATIENT)
Dept: FAMILY MEDICINE CLINIC | Age: 22
End: 2022-05-23

## 2022-05-23 NOTE — TELEPHONE ENCOUNTER
----- Message from Chauncey Apley sent at 5/23/2022  9:37 AM EDT -----  Subject: Appointment Request    Reason for Call: Routine Existing Condition Follow Up    QUESTIONS  Type of Appointment? Established Patient  Reason for appointment request? No appointments available during search  Additional Information for Provider? pt missed appt on 5/23  she would   like to make another appt for next week  screen green   ---------------------------------------------------------------------------  --------------  CALL BACK INFO  What is the best way for the office to contact you? Do not leave any   message, patient will call back for answer  Preferred Call Back Phone Number? [phone]  ---------------------------------------------------------------------------  --------------  SCRIPT ANSWERS  Relationship to Patient? Self  Is this follow up request related to routine Diabetes Management? No  Have you been diagnosed with, awaiting test results for, or told that you   are suspected of having COVID-19 (Coronavirus)? (If patient has tested   negative or was tested as a requirement for work, school, or travel and   not based on symptoms, answer no)? No  Within the past 10 days have you developed any of the following symptoms   (answer no if symptoms have been present longer than 10 days or began   more than 10 days ago)? Fever or Chills, Cough, Shortness of breath or   difficulty breathing, Loss of taste or smell, Sore throat, Nasal   congestion, Sneezing or runny nose, Fatigue or generalized body aches   (answer no if pain is specific to a body part e.g. back pain), Diarrhea,   Headache? No  Have you had close contact with someone with COVID-19 in the last 7 days? No  (Service Expert  click yes below to proceed with [employer] As Usual   Scheduling)?  Yes

## 2022-05-23 NOTE — TELEPHONE ENCOUNTER
Spoke to patient and informed that per last visit on 5/10/22 she is not due for a follow up for 2 months, scheduled appointment    Future Appointments   Date Time Provider Timur Mackey   6/22/2022 12:30 PM Praful Simon, PHD [facility] BS AMB   7/7/2022  8:30 AM Robyn Solares DO [facility] BS AMB

## 2022-06-06 NOTE — PROGRESS NOTES
Ladi Gil (: 2000) is a 19 y.o. female, established patient, here for a VIRTUAL VISIT to address:    ASSESSMENT/PLAN:    ICD-10-CM ICD-9-CM   1. Anxiety about health  F41.8 300.09   2. Mild persistent asthma without complication  U27.46 183.41   3. Gastroesophageal reflux disease with esophagitis without hemorrhage  K21.00 530.81     530.10   4. Systemic lupus erythematosus, unspecified SLE type, unspecified organ involvement status ([facility] [address]  M32.9 710.0   5. Anticoagulated  Z79.01 V58.61   6. Panic attacks  F41.0 300.01   7. Brain fog  R41.89 799.59   8. Medication monitoring encounter  Z51.81 V58.83     #Anxiety about health - uncontrolled  With #Panic attacks   Reviewed risk/benefit of both preventative daily treatment and rescue (prn) medication.      Failed treatments:   · Felt \"zombie\" like with Zoloft (3mo of use).    · Xanax helps with sx but reports is too sedating even with use of 1/2 of 0.25mg tablet. · CBD gummies help with sx but too sedating. · Poorly tolerated Duloxetine and discontinued after short use  · Poorly tolerated Prozac stating felt anxious with every dose she took, stopped before using regularly ~1mo     Current regimen and adjustments:   · Fearful of daily preventative medication out of concern for worsening emotional blunting, reviewed after trials with multiple medications from different pharmacologic groups will need to establish care with Psychiatry to help guide further medication management  · Lorazepam as rescue agent ONLY (previously used as AED and believes tolerated well). Goal to use less than twice weekly on average. Reviewed to avoid prolonged regular use >6wks to prevent dependence. Effective response in symptoms with one dose. Reviewed needs in person visit to review and sign CSA before next refill.    · Established with Therapist   · Rec review initial eval with Neuropsychiatry 22, planning for formal assessment     2022   1  Lorazepam 0.5 Mg Tablet   30.00  30  Ki Neg  [id number]  Matthias ([id number])  0  0.50 LME  [place]  [place]     12/03/2021 12/03/2021   1  Alprazolam 0.25 Mg Tablet   10.00  10  [place]  [id number]  Matthias ([id number])  0  0.50 LME  Medicaid       Last PDMP Joe as Reviewed:  Review User Review Instant Review Result   Mariposa Tracy 6/7/2022  5:30 PM Reviewed PDMP [1]     #GERD with esophagitis  And #Esophageal stenosis s/p dilation  And #Atrophic gastritis  Rec review of EGD 4/27/22 with \"LA Grade A reflux esophagitis. Rule out Dutta's esophagus. Biopsied. Benign-appearing esophageal stenosis. Dilated. Atrophic gastritis. Biopsied.  Normal examined duodenum\" and plan to increase Omeprazole to 40mg and for gastric emptying study and repeat EGD prn for retreatment     Primary concern of #Brain fog - unclear etiology   Initial lab workup without contributing factors  Record review MRI Brain 10/18/18 -Alex Curls looks normal. Increased number of small left intraparotid and retroauricular lymph nodes, nonspecific but likely benign and reactive.   MRI brain 4/25/22 with no acute infarct, hemorrhage, mass effect, or herniation. Rec review Neuropsych eval 4/29/22  1. R/O ADHD versus MCI  2. Anxiety/Panic Attacks  3. H/O Seizure     #Under suspicion for Lupus Cerebritis  When first diagnosed with Lupus (Late 2018), was having significant migraines (daily), hyperpigmented rash on arms/face, and fatigue - also notes significant memory loss regarding this time of life. Reports initial symptoms improved after starting Lupus treatment (Cellcept, Plaquenil) but has had recent recurrence of neuro sx in last few months and interested in evaluation.  No longer on Cellcept.   Referred to Neurology 3/28/22 (Briseyda) - unable to schedule prior to October 2022      #Hx of Seizure - reports 1 prior seizure; Not on AEDs  Reports occurrence of Seizure around time of initial Lupus diagnosis (Late 2018)  Record review EEG 10/18/18: Normal EEG recorded during the awake state.  No epileptiform discharges or focal abnormality was seen. This does not preclude a diagnosis of epilepsy.       #mild left sinus disease - incidental finding on recent MRI brain  Reviewed optimal use of OTC agents for symptom control and provided with instructions. Instructed to start daily claritin and flonase at least two weeks and monitor for improvement in symptoms. If limited improvement, could consider short abx course.      #mild persistent asthma vs mild intermittent  Has not been using Albuterol and Flovent, has had difficulty with spacer. Reviewed instructions and optimal use to improve symptoms. Given difficulty with inhalers, encouraged to find nebulizer machine to utilize nebulizer treatments when having acute illness.    Follow up if use of Albuterol inhaler increases to twice weekly or more.   Established with Pulmonology; Encouraged to follow up and pursue sleep study to r/o MADDY contributing to sx (STOP [place] screen 3, high risk)                             Key COPD Medications                 albuterol (PROVENTIL HFA, VENTOLIN HFA, PROAIR HFA) 90 mcg/actuation inhaler (Taking) Take 2 Puffs by inhalation every four (4) hours as needed.     albuterol (PROVENTIL VENTOLIN) 2.5 mg /3 mL (0.083 %) nebu 1.5 mL by Nebulization route every four (4) hours as needed for Wheezing, Shortness of Breath or Respiratory Distress.     Flovent HFA 44 mcg/actuation inhaler Take 2 Puffs by inhalation two (2) times a day.          #Acute DVT of LLE in setting of #Lupus   #Anticoagulated on Xarelto  Established with Rheumatology for Lupus monitoring   Taking plaquenil; Cellcept discontinued     #HLD - goal <100; Newly at goal!  Reviewed Statin hx, unclear if needed but has not been using regularly.  Interested to stop using given Patient's goal to reduce pill burden and continue with monitoring and focusing on management with heart healthy habits.                Lab Results   Component Value Date/Time     LDL, calculated 88 04/04/2022 01:21 PM      #Vit D Def - reviewed dx and recommended supplement dosing     No orders of the defined types were placed in this encounter. Return in about 3 months (around 9/7/2022) for 30 min follow up in person for medication monitoring visit . SUBJECTIVE/OBJECTIVE:  Last PCP visit:  5/10/2022    Since last visit:   Any changes in health, procedures, hospital visits, or specialist visits? Denies  Tolerating medications without adverse reactions? Stopped prozac abruptly 3 days ago. Current Outpatient Medications   Medication Sig    azithromycin (ZITHROMAX) 250 mg tablet Take 2 tablets PO today, then take 1 tablet PO daily    fluticasone propionate (FLONASE) 50 mcg/actuation nasal spray 2 Sprays by Both Nostrils route daily.  loratadine (CLARITIN) 10 mg tablet loratadine 10 mg tablet  Take 1 tablet every day by oral route    famotidine (PEPCID) 20 mg tablet Take 20 mg by mouth two (2) times a day.  LORazepam (ATIVAN) 0.5 mg tablet Take 1 Tablet by mouth daily as needed for Anxiety.  albuterol (PROVENTIL VENTOLIN) 2.5 mg /3 mL (0.083 %) nebu 1.5 mL by Nebulization route every four (4) hours as needed for Wheezing, Shortness of Breath or Respiratory Distress.  rivaroxaban (Xarelto) 20 mg tab tablet Take 1 Tablet by mouth daily.  Flovent HFA 44 mcg/actuation inhaler Take 2 Puffs by inhalation two (2) times a day. (Patient not taking: Reported on 4/27/2022)    albuterol (PROVENTIL HFA, VENTOLIN HFA, PROAIR HFA) 90 mcg/actuation inhaler Take 2 Puffs by inhalation every four (4) hours as needed. (Patient not taking: Reported on 4/27/2022)    hydroxychloroquine (PLAQUENIL) 200 mg tablet Take 200 mg by mouth daily. No current facility-administered medications for this visit. There were no vitals taken for this visit. Physical Exam  Nursing note reviewed. Constitutional:       General: She is not in acute distress.   Pulmonary:      Effort: Pulmonary effort is normal. No respiratory distress. Neurological:      Mental Status: She is alert and oriented to person, place, and time. Psychiatric:         Mood and Affect: Mood is anxious. Speech: Speech normal.         Behavior: Behavior normal.           Ladi Gil, who was evaluated through a synchronous (real-time) audio-video encounter, and/or her healthcare decision maker, is aware that it is a billable service, which includes applicable co-pays, with coverage as determined by her insurance carrier. She provided verbal consent to proceed and patient identification was verified. This visit was conducted pursuant to the emergency declaration under the [address] [place] [address], [address] [place] [address] authority and the [employer] Act. A caregiver was present when appropriate. Ability to conduct physical exam was limited. The patient was located at home in a state where the provider was licensed to provide care.      Colleen Aleman DO  Family Medicine  06/07/2022

## 2022-06-07 ENCOUNTER — VIRTUAL VISIT (OUTPATIENT)
Dept: FAMILY MEDICINE CLINIC | Age: 22
End: 2022-06-07
Payer: MEDICAID

## 2022-06-07 DIAGNOSIS — F41.0 PANIC ATTACKS: ICD-10-CM

## 2022-06-07 DIAGNOSIS — Z51.81 MEDICATION MONITORING ENCOUNTER: ICD-10-CM

## 2022-06-07 DIAGNOSIS — K21.00 GASTROESOPHAGEAL REFLUX DISEASE WITH ESOPHAGITIS WITHOUT HEMORRHAGE: ICD-10-CM

## 2022-06-07 DIAGNOSIS — Z79.01 ANTICOAGULATED: ICD-10-CM

## 2022-06-07 DIAGNOSIS — R41.89 BRAIN FOG: ICD-10-CM

## 2022-06-07 DIAGNOSIS — M32.9 SYSTEMIC LUPUS ERYTHEMATOSUS, UNSPECIFIED SLE TYPE, UNSPECIFIED ORGAN INVOLVEMENT STATUS (HCC): ICD-10-CM

## 2022-06-07 DIAGNOSIS — F41.8 ANXIETY ABOUT HEALTH: Primary | ICD-10-CM

## 2022-06-07 DIAGNOSIS — J45.30 MILD PERSISTENT ASTHMA WITHOUT COMPLICATION: ICD-10-CM

## 2022-06-07 PROCEDURE — 99214 OFFICE O/P EST MOD 30 MIN: CPT | Performed by: STUDENT IN AN ORGANIZED HEALTH CARE EDUCATION/TRAINING PROGRAM

## 2022-06-22 ENCOUNTER — OFFICE VISIT (OUTPATIENT)
Dept: NEUROLOGY | Age: 22
End: 2022-06-22
Payer: MEDICAID

## 2022-06-22 DIAGNOSIS — F33.1 MODERATE EPISODE OF RECURRENT MAJOR DEPRESSIVE DISORDER (HCC): ICD-10-CM

## 2022-06-22 DIAGNOSIS — Z87.898 HISTORY OF LEARNING DISABILITY: ICD-10-CM

## 2022-06-22 DIAGNOSIS — Z78.9 ALCOHOL USE: ICD-10-CM

## 2022-06-22 DIAGNOSIS — F41.9 ANXIETY DISORDER, UNSPECIFIED TYPE: ICD-10-CM

## 2022-06-22 DIAGNOSIS — F90.9 ATTENTION DEFICIT HYPERACTIVITY DISORDER (ADHD), UNSPECIFIED ADHD TYPE: Primary | ICD-10-CM

## 2022-06-22 PROCEDURE — 96137 PSYCL/NRPSYC TST PHY/QHP EA: CPT | Performed by: PSYCHOLOGIST

## 2022-06-22 PROCEDURE — 96138 PSYCL/NRPSYC TECH 1ST: CPT | Performed by: PSYCHOLOGIST

## 2022-06-22 PROCEDURE — 96131 PSYCL TST EVAL PHYS/QHP EA: CPT | Performed by: PSYCHOLOGIST

## 2022-06-22 PROCEDURE — 96139 PSYCL/NRPSYC TST TECH EA: CPT | Performed by: PSYCHOLOGIST

## 2022-06-22 PROCEDURE — 96130 PSYCL TST EVAL PHYS/QHP 1ST: CPT | Performed by: PSYCHOLOGIST

## 2022-06-22 PROCEDURE — 96136 PSYCL/NRPSYC TST PHY/QHP 1ST: CPT | Performed by: PSYCHOLOGIST

## 2022-06-23 NOTE — PROGRESS NOTES
[facility]  Neuroscience   [address] [place] [address]. [facility], [facility]  Office:  [phone]  Fax: [phone]                Neuropsychological Evaluation Report    Patient Name: Noy Luo  Age: 23 y.o. Gender: female   Handedness: right handed   Presenting Concern: cognitive complaints; anxiety  Primary Care Physician: Boubacar Siu DO  Referring Provider: Boubacar Siu DO    PATIENT HISTORY (OBTAINED DURING INITIAL CLINICAL EVALUATION):    REASON FOR REFERRAL:  This comprehensive and medically necessary neuropsychological assessment was requested to assist a differential diagnosis of cognitive and psychological concerns. The use and purpose of this examination, as well as the extent and limitations of confidentiality, were explained prior to obtaining permission to participate. Instructions were provided regarding the necessity to put forth optimal effort and answer questions truthfully in order to obtain reliable and accurate test results. REVIEW OF RECORDS:  Ms. Tiana Rowe was referred by her PCP for work-up of brain fog. A history of panic attacks is noted. A brain MRI on 4/25/2022 showed no acute infarct, hemorrhage, mass-effect, or herniation. Lupus is suspected. History is significant for one seizure which occurred on 10/18/2018. A normal EEG followed. Duloxetine has been started for anxiety; lorazepam is used for rescue. Current Outpatient Medications   Medication Sig    rivaroxaban (Xarelto) 20 mg tab tablet Take 1 Tablet by mouth daily. Indications: clot prevention    azithromycin (ZITHROMAX) 250 mg tablet Take 2 tablets PO today, then take 1 tablet PO daily    fluticasone propionate (FLONASE) 50 mcg/actuation nasal spray 2 Sprays by Both Nostrils route daily.     loratadine (CLARITIN) 10 mg tablet loratadine 10 mg tablet  Take 1 tablet every day by oral route    famotidine (PEPCID) 20 mg tablet Take 20 mg by mouth two (2) times a day.  LORazepam (ATIVAN) 0.5 mg tablet Take 1 Tablet by mouth daily as needed for Anxiety.  albuterol (PROVENTIL VENTOLIN) 2.5 mg /3 mL (0.083 %) nebu 1.5 mL by Nebulization route every four (4) hours as needed for Wheezing, Shortness of Breath or Respiratory Distress.  Flovent HFA 44 mcg/actuation inhaler Take 2 Puffs by inhalation two (2) times a day. (Patient not taking: Reported on 4/27/2022)    albuterol (PROVENTIL HFA, VENTOLIN HFA, PROAIR HFA) 90 mcg/actuation inhaler Take 2 Puffs by inhalation every four (4) hours as needed. (Patient not taking: Reported on 4/27/2022)    hydroxychloroquine (PLAQUENIL) 200 mg tablet Take 200 mg by mouth daily. No current facility-administered medications for this visit. Past Medical History:   Diagnosis Date    Acid reflux     [facility]up[facility] ([facility] [address]     Lupus ([facility] [address] 09/2018     Past Surgical History:   Procedure Laterality Date    HX TONSIL AND ADENOIDECTOMY      HX TONSILLECTOMY      HX WISDOM TEETH EXTRACTION         CLINICAL INTERVIEW:  Ms. Yo Rosales was unaccompanied for her virtual visit. Consistent with records, she reported brain fog which first emerged 12 months ago. The course has been variable since that time. Developmental history is unremarkable. Ms. Yo Rosales was born on time and proceeded to meet developmental milestones as expected. Neurologic history is significant for one seizure which occurred while Ms. Yo Rosales was hospitalized. There is no history of stroke, syncope, or significant head trauma. Sleep disturbance includes occasional difficulties with sleep onset and maintenance. Snoring was denied. Daytime fatigue was described as occasional.  Pain complaints were denied. Alcohol use was described as occasional.  There is no tobacco or illicit substance use. Family history of neurologic illness is significant for ADHD and seizures in a cousin. With regard to emotional functioning, Ms. Yo Rosales stated that she first began experiencing anxiety two years ago. It has worsened in severity since that time. She is currently taking Prozac and lorazepam, the latter of which she takes 1-2 times per week. Ms. Raisa Currie has a history of transient suicidal ideation prior to being diagnosed with lupus. At the time of this interview, she adamantly denied suicidal ideation, plan, and intent. She has a history of one psychiatric hospitalization prior to being diagnosed with lupus. She is unsure why this admission was necessary but believes she may have had paranoia at the time. With regard to psychotic symptoms, Ms. Raisa Currie stated that she will sometimes wake up and perceive a dark figure. At that time, she is unable to get up from her bed. Self-harm behaviors and psychological trauma were denied. Socially, Ms. Raisa Currie has never been . She has no children and lives with her parents. She exercises intermittently. Her workouts have recently been disrupted due to her medical appointments. Hobbies include cooking, singing, and outings with friends and family. Friend and family network of support were described as good. 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 She is in her first year, having started classes in February. She had no IEP in place during high school but did have specialized help in the area of English, comprehension, and reading. Vocationally, Ms. Raisa Currie is employed part-time at [employer] Functionally, Ms. Raisa Currie remains independent for ADL and IADL care. She maintains a 's permit but is never held a license.     MENTAL STATUS:    Sensorium  Awake, Aware, Alert   Orientation person, place, time/date, situation, day of week, month of year and year   Relations cooperative   Eye Contact appropriate   Appearance:  age appropriate   Motor Behavior:  within normal limits   Speech:  normal pitch and normal volume   Vocabulary average   Thought Process: within normal limits Thought Content free of delusions and free of hallucinations   Suicidal ideations none   Homicidal ideations none   Mood:  euthymic   Affect:  mood-congruent   Memory recent  adequate   Memory remote:  adequate   Concentration:  adequate   Abstraction:  abstract   Insight:  fair   Reliability fair   Judgment:  fair     METHODS OF ASSESSMENT (Current Evaluation):  Clinician Administered:  Hawley Anxiety Scale (ARACELI)  Hawley Depression Scale-II (BDI-II)  Detailed Assessment of Posttraumatic Stress (DAPS)  Personality Assessment Inventory (SUSI-2)    Technician Administered:  Category Fluency for Animals  Controlled Oral Word Association Test  RACHID-2 Continuous Performance Test  Neuropsychological Assessment Battery-Memory Module and Select Subtests  Reliable Digit Span  Trailmaking Test  Wechsler Adult Intelligence Scale-IV    TEST OBSERVATIONS:  Ms. Onesimo Storm arrived promptly for the testing session. Dress and grooming were appropriate; physical presentation was unchanged from that observed during the clinical interview. Speech was fluent and coherent with normal rate, rhythm, tone, and volume. No expressive or receptive language deficits were noted. No unusual behaviors or psychomotor abnormalities were observed. Thought process was logical, relevant, and focused. Thought content showed no apparent delusional ideation. Auditory and visual hallucinations were denied and there was no obvious response to internal stimuli. Affect was congruent with the anxious mood conveyed. Ms. Onesimo Storm was adequately cooperative and appeared to put forth good effort throughout this examination. Rapport with the examiner was adequately established and maintained. Minimal prompting was required. Comprehension of test instructions was not problematic. Performance motivation was objectively measured with one instrument (Reliable Digit Span); Ms. Onesimo Storm produced a normal score on this measure.  Accordingly, test findings below do not appear to be the product of disingenuous effort. Given the above observations, plus comments contained in the Mental Status section, the results of this examination are regarded as reasonably reliable and valid. TEST RESULTS:  Quantitative test results are derived from comparisons to age and education corrected cohort normative data, where applicable. Percentiles are included in these instances. Qualitative test results are determined using clinical observations. General Orientation and Awareness:       Orientation to person yes   Time yes  Place yes  Circumstance yes                     Sensory-Perceptual and Motor Functioning:    Visual and auditory acuity:  WNL       Gait and balance: WNL                                        Classification:    Attention/Concentration:       Simple visuomotor tracking (35 percentile)                   Average     On a continuous performance test, difficulties were noted in the following areas: Visual attention and auditory sustained attention. Profiles of this type are associated with a disorder characterized by attention deficits.     Visuospatial and Constructional Praxis:     Visual discrimination (42 percentile)                               Average     Language:            Phonemic verbal fluency (12 percentile)                                  Low Average   Categorical verbal fluency (93 percentile)                   Above Average    Memory and Learning:       Word list immediate recall (7 percentile)                    Mildly Impaired   Word list short delayed recall (7 percentile)                   Mildly Impaired   Word list long delayed recall (3 percentile)                              Moderately Impaired   Forced Choice Recognition (<1 percentile)        Severely Impaired  Shape learning immediate recognition (46 percentile)        Average   Shape learning delayed recognition (50 percentile)                  Average   Forced Choice Recognition (6 percentile)         Mildly Impaired   Story learning immediate recall (4 percentile)         Moderately Impaired   Story learning delayed recall (7 percentile)                              Mildly Impaired     Cognitive Tests of Executive Functioning:     Ability to think flexibly, Trailmaking B (21 percentile)                  Low Average     Intellectual and Basic Cognitive Functioning (WAIS-IV)  Verbal Comprehension Index: 80  Percentile: 9       Low Average   Similarities: 6    Percentile: 9      Vocabulary: 5     Percentile: 5           Information: 8    Percentile: 25     Perceptual Reasoning Index: 88  Percentile: 21       Low Average   Block Design: 6   Percentile: 9      Matrix Reasoning: 10   Percentile: 50           Visual Puzzles: 8   Percentile: 25     Working Memory Index: 80  Percentile: 9       Low Average   Digit Span: 8    Percentile: 25      Arithmetic: 5    Percentile: 5     Processing Speed: 84   Percentile: 14       Low Average   Symbol Search: 7   Percentile: 16      Codin    Percentile: 16     Full Scale IQ: 79    Percentile: 8       Borderline    Emotional Functioning:  Screening of Emotional/Psychiatric Status:  Level of self-reported anxiety    (47/63)       Severe        Level of self-reported depression   (21/63)       Moderate          On a measure of symptomatic responding to a specific traumatic event, Ms. Frannie Parra denied experiencing any of the traumas assessed by the measure. On a measure of general emotional functioning, there was evidence of negative impression management. Profiles of this type are typically associated with significant psychological distress. In particular, Ms. Gil endorsed multifaceted anxiety including tension, worry, difficulty relaxing, concerns about health matters, phobic behaviors, and hypervigilance. She also reported a history of problematic drug and alcohol use.   Cognitive symptoms appear to include confusion, distractibility, and difficulties concentrating. Mood was characterized as irritable. Interpersonally, Ms. Nivia Hugo characterized herself as self-assured, confident, and dominant. She also provided responses that suggest that she is generally resilient and optimistic though her self-esteem may be reactive to changes in her current environment. IMPRESSIONS/RECOMMENDATIONS:  Test performance suggests the presence of chronic ADHD. Evidence of this was seen on a continuous performance test, story memory, and particular patterns in the intellectual realm. With regard to the latter, Ms. Nivia Hugo produced fairly consistent scores among and within intellectual indices with the exception of perceptual reasoning where she demonstrated better ability on untimed tasks. Taken together, it does appear that Ms. Nivia Hugo has an attention-related disorder that would benefit from pharmacotherapy. That said, caution would be advised due to the presence of underlying anxiety. Coupled with chronic ADHD, additional difficulties were observed in the areas of list learning/recall/recognition and shape recognition. The particular pattern of scores, coupled with normal neuroimaging, leaves me suspecting that a chronic history of learning difficulty and persistent psychological distress may be related to these memory difficulties. While I did not assess for a specific learning disability, Ms. Nivia Hugo reported chronic learning difficulties in the areas of English, comprehension, and reading. She also demonstrated normative weaknesses in all of the intellectual areas assessed. This reported history and test performance do suggest an underlying learning disability and/or borderline intellectual functioning which are now exacerbated by the anxiety and depression, this despite psychotherapy. For this reason, I would recommend that Ms. Gil obtain [employer] Therapy which can help to address the physical, cognitive, and affective symptoms that she experiences. I would also encourage Ms. Gil to collaborate with her family and her psychotherapist on identifying areas of functional independence (bill payment, obtaining gainful employment, medication management, and other adaptive skills) where support might be necessary. Ms. Renetta Pérez is encouraged to contact the Office of Disability Services at [facility] so that appropriate academic accommodations can be discussed. Otherwise, Ms. Renetta Pérez is encouraged to minimize substance use and to consider the general recommendations below. DIAGNOSTIC IMPRESSIONS:  1. ADHD, chronic  2. 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 Major Depressive Disorder, moderate  4. Anxiety Disorder, unspecified  5. Alcohol Use    GENERAL RECOMMENDATIONS:   Exercise: Exercise can improve your thinking and ability to focus. Activities such as gardening, caring for pets, or walking, can help improve your attention and concentration levels. Meditation: Meditation can help improve brain function by increasing your focus and awareness. Day-to-day coping   Use a detailed daily planner, notebooks, reminder notes, or your smart phone. Bartongretchen Maldonado Keeping everything in one place makes it easier to find the reminders you may need. You might want to keep track of appointments and schedules, to do lists, important dates, websites, phone numbers and addresses, meeting notes, and even movies youd like to see or books youd like to read.  Do the most demanding tasks at the time of they day when you feel your energy levels are the highest.   Exercise your brain. Take a class, do word puzzles, or learn a new language.  Get enough rest and sleep.  Keep moving. Regular physical activity is not only good for your body, but also improves your mood, makes you feel more alert, and decreases tiredness (fatigue).  Eat veggies.  Studies have shown that eating more vegetables is linked to keeping brain power as people age.  Set up and follow routines. Try to keep the same daily schedule.  Pick a certain place for commonly lost objects (like keys) and put them there each time.  Try not to multi-task. Focus on one thing at a time.  Avoid alcohol and other agents that might change your mental state and sleeping patterns   Ask for help when you need it. Friends and loved ones can help with daily tasks to cut down on distractions and help you save mental energy.  Track your memory problems. Keep a diary of when you notice problems and whats going on at the time. Medicines taken, time of day, and the situation youre in might help you figure out what affects your memory. Keeping track of when the problems are most noticeable can also help you prepare. Severiano John know to avoid planning important conversations or appointments during those times. This record will also be useful when you talk with your doctor about these problems. Thank you for allowing me the opportunity to assist you in Ms. Gil's care. Please do not hesitate to contact me should you have additional questions that I may not have addressed. 96136 x 1  96137 x 1  96138 x 1  96139 x 4  96130 x 1  96131 x 1    [facility] Darryl Mattson, Ph.D.   Licensed Clinical Psychologist        Time Documentation:     34975 x 1   17822 x 1  Neuropsych testing/data gathering by Neuropsychologist: (1 hour), 96136 x 1 (30 minutes), 96137 x 1 (additional 30 minutes)     96138 x 1  96139 x 4 Test Administration/Data Gathering By Technician: (2.5 hours). 30767 x 1 (first 30 minutes), 96139 x 4 (each additional 30 minutes)     96130 x 1  96131 x 1 Testing Evaluation Services by Neuropsychologist (1 hour, 50 minutes) 96130 x 1 (first hour), 96131 x 1 (50 minutes)     The above includes: Record review. Review of history provided by patient. Review of collaborative information. Testing by Clinician. Review of raw data. Scoring.  Report writing of individual tests administered by Clinician. Integration of individual tests administered by psychometrist with NSE/testing by clinician, review of records/history/collaborative information, case Conceptualization, treatment planning, clinical decision making, report writing, coordination Of Care. Psychometry test codes as time spent by psychometrist administering and scoring neurocognitive/psychological tests under supervision of neuropsychologist.  Integral services including scoring of raw data, data interpretation, case conceptualization, report writing etcetera were initiated after the patient finished testing/raw data collected and was completed on the date the report was signed. This note will not be viewable in [address] This note was created using voice recognition software. Despite editing, there may be syntax errors.

## 2022-07-12 ENCOUNTER — VIRTUAL VISIT (OUTPATIENT)
Dept: NEUROLOGY | Age: 22
End: 2022-07-12
Payer: MEDICAID

## 2022-07-12 DIAGNOSIS — F90.9 ATTENTION DEFICIT HYPERACTIVITY DISORDER (ADHD), UNSPECIFIED ADHD TYPE: Primary | ICD-10-CM

## 2022-07-12 DIAGNOSIS — F41.9 ANXIETY DISORDER, UNSPECIFIED TYPE: ICD-10-CM

## 2022-07-12 DIAGNOSIS — Z87.898 HISTORY OF LEARNING DISABILITY: ICD-10-CM

## 2022-07-12 DIAGNOSIS — Z78.9 ALCOHOL USE: ICD-10-CM

## 2022-07-12 DIAGNOSIS — F33.1 MODERATE EPISODE OF RECURRENT MAJOR DEPRESSIVE DISORDER (HCC): ICD-10-CM

## 2022-07-12 PROCEDURE — 90832 PSYTX W PT 30 MINUTES: CPT | Performed by: PSYCHOLOGIST

## 2022-07-12 NOTE — PROGRESS NOTES
Interactive Psychotherapy/office feedback        Interactive office feedback session with Ms. Gil. I reviewed the results of the recent Neuropsychological Evaluation  including the observed areas of neurocognitive strengths and weaknesses. Education was provided regarding my diagnostic impressions, and treatment plan/options were discussed. I also answered numerous questions related to the clinical findings, including the various methods to improve cognition and mood. CBT, psychoeducation, and supportive psychotherapy techniques were utilized. Report place in [facility] per patient's request.     Prior to seeing the patient I reviewed the records, including the previously completed report, the records in [place], and any updated visits from other providers since I saw the patient last.       Diagnoses:      1. ADHD, chronic  2. 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 Major Depressive Disorder, moderate  4. Anxiety Disorder, unspecified  5. Alcohol Use    The patient will follow up with the referring provider, and reported being very pleased with the services provided. Follow up with [facility] prn.         11668 psychotherapy 30 Minutes      This note was created using voice recognition software. Despite editing, there may be syntax errors. Sean Lim is a 23 y.o. female who was evaluated by an audio-video encounter for concerns as above. Patient identification was verified prior to start of the visit. Pursuant to the emergency declaration under the [facility], [facility] waiver authority and the [employer] and [employer] Act, this Virtual Visit was conducted, with patient's (and/or legal guardian's) consent, to reduce the patient's risk of exposure to COVID-19 and provide necessary medical care.      Services were provided through a synchronous discussion virtually to substitute for in-person clinic visit. I was at home. The patient was at home. Veronica Encinas is a 23 y.o. female who was evaluated by an audio-video encounter for concerns as above. Patient identification was verified prior to start of the visit. Pursuant to the emergency declaration under the [address] [place] [address], [facility] waiver authority and the [employer] and [employer] Act, this Virtual Visit was conducted, with patient's (and/or legal guardian's) consent, to reduce the patient's risk of exposure to COVID-19 and provide necessary medical care. Services were provided through a synchronous discussion virtually to substitute for in-person clinic visit. I was at home. The patient was at home.

## 2022-07-19 ENCOUNTER — VIRTUAL VISIT (OUTPATIENT)
Dept: FAMILY MEDICINE CLINIC | Age: 22
End: 2022-07-19
Payer: MEDICAID

## 2022-07-19 DIAGNOSIS — F41.0 PANIC ATTACKS: ICD-10-CM

## 2022-07-19 DIAGNOSIS — Z79.01 ANTICOAGULATED: ICD-10-CM

## 2022-07-19 DIAGNOSIS — M32.9 SYSTEMIC LUPUS ERYTHEMATOSUS, UNSPECIFIED SLE TYPE, UNSPECIFIED ORGAN INVOLVEMENT STATUS (HCC): ICD-10-CM

## 2022-07-19 DIAGNOSIS — F90.9 ATTENTION DEFICIT HYPERACTIVITY DISORDER (ADHD), UNSPECIFIED ADHD TYPE: Primary | ICD-10-CM

## 2022-07-19 DIAGNOSIS — R41.83 BORDERLINE INTELLECTUAL DISABILITY: ICD-10-CM

## 2022-07-19 DIAGNOSIS — F41.8 ANXIETY ABOUT HEALTH: ICD-10-CM

## 2022-07-19 PROCEDURE — 99215 OFFICE O/P EST HI 40 MIN: CPT | Performed by: STUDENT IN AN ORGANIZED HEALTH CARE EDUCATION/TRAINING PROGRAM

## 2022-07-19 RX ORDER — ATOMOXETINE 40 MG/1
40 CAPSULE ORAL DAILY
Qty: 90 CAPSULE | Refills: 0 | Status: SHIPPED | OUTPATIENT
Start: 2022-07-19 | End: 2022-08-17 | Stop reason: ALTCHOICE

## 2022-07-19 NOTE — PROGRESS NOTES
Ladi Gil (: 2000) is a 23 y.o. female, ESTABLISHED patient, here for VIRTUAL VISIT:    ICD-10-CM ICD-9-CM   1. Attention deficit hyperactivity disorder (ADHD), unspecified ADHD type  F90.9 314.01   2. Borderline intellectual disability  R41.83 V62.89   3. Systemic lupus erythematosus, unspecified SLE type, unspecified organ involvement status ([facility] [address]  M32.9 710.0   4. Anxiety about health  F41.8 300.09   5. Anticoagulated  Z79.01 V58.61   6. Panic attacks  F41.0 300.01     Assessment   Plan     #MDD   #Anxiety about health - uncontrolled  With #Panic attacks   #ADHD - Formal Neuropsychiatry dx 2022  #Borderline intellectual learning disability   Reviewed risk/benefit of both preventative daily treatment and rescue (prn) medication. Failed treatments:   Felt \"zombie\" like with Zoloft (3mo of use). Xanax helps with sx but reports is too sedating even with use of 1/2 of 0.25mg tablet. CBD gummies help with sx but too sedating. Poorly tolerated Duloxetine and discontinued after short use  Poorly tolerated Prozac stating felt anxious with every dose she took, stopped before using regularly ~1mo     Current regimen and adjustments:   Fearful of daily preventative medication out of concern for worsening emotional blunting, reviewed after trials with multiple medications from different pharmacologic groups will need to establish care with Psychiatry to help guide further medication management  Lorazepam as rescue agent ONLY (previously used as AED and believes tolerated well). Goal to use less than twice weekly on average. Reviewed to avoid prolonged regular use >6wks to prevent dependence. Effective response in symptoms with one dose. Reviewed needs in person visit to review and sign CSA before next refill. Established with Therapist   Interested in trial of nonstimulant treatment for ADHD, will start Atomoxetine.       2022    1  Lorazepam 0.5 Mg Tablet    30.00  30  Ki Neg  [id number]  Matthias (0556)  0  0.50 LME  [place]  [place]     12/03/2021 12/03/2021    1  Alprazolam 0.25 Mg Tablet    10.00  10  [place]  [id number]  Matthias (7856)  0  0.50 LME  Medicaid         Last PDMP Joe as Reviewed:  Review User Review Instant Review Result   Denzel Carreno 6/7/2022  5:30 PM Reviewed PDMP [1]      #GERD with esophagitis  And #Esophageal stenosis s/p dilation  And #Atrophic gastritis  Rec review of EGD 4/27/22 with \"LA Grade A reflux esophagitis. Rule out Dutta's esophagus. Biopsied. Benign-appearing esophageal stenosis. Dilated. Atrophic gastritis. Biopsied. Normal examined duodenum\" and plan to increase Omeprazole to 40mg and for gastric emptying study and repeat EGD prn for retreatment     #Hx of Seizure - reports 1 prior seizure; Not on AEDs  Reports occurrence of Seizure around time of initial Lupus diagnosis (Late 2018)  Record review EEG 10/18/18: Normal EEG recorded during the awake state. No epileptiform discharges or focal abnormality was seen. This does not preclude a diagnosis of epilepsy. When first diagnosed with Lupus (Late 2018), was having significant migraines (daily), hyperpigmented rash on arms/face, and fatigue - also notes significant memory loss regarding this time of life. Reports initial symptoms improved after starting Lupus treatment (Cellcept, Plaquenil) but has had recent recurrence of neuro sx in last few months and interested in evaluation. No longer on Cellcept. MRI brain 4/25/22 with no acute infarct, hemorrhage, mass effect, or herniation. Referred to Neurology 3/28/22 Mateo Gibson - unable to schedule prior to October 2022      #mild left sinus disease - incidental finding on recent MRI brain  Reviewed optimal use of OTC agents for symptom control and provided with instructions. Instructed to start daily claritin and flonase at least two weeks and monitor for improvement in symptoms. If limited improvement, could consider short abx course.       #mild persistent asthma vs mild intermittent  Has not been using Albuterol and Flovent, has had difficulty with spacer. Reviewed instructions and optimal use to improve symptoms. Given difficulty with inhalers, encouraged to find nebulizer machine to utilize nebulizer treatments when having acute illness. Follow up if use of Albuterol inhaler increases to twice weekly or more. Established with Pulmonology; Encouraged to follow up and pursue sleep study to r/o MADDY contributing to sx (STOP Bang screen 3, high risk)                             Key COPD Medications                 albuterol (PROVENTIL HFA, VENTOLIN HFA, PROAIR HFA) 90 mcg/actuation inhaler (Taking) Take 2 Puffs by inhalation every four (4) hours as needed. albuterol (PROVENTIL VENTOLIN) 2.5 mg /3 mL (0.083 %) nebu 1.5 mL by Nebulization route every four (4) hours as needed for Wheezing, Shortness of Breath or Respiratory Distress. Flovent HFA 44 mcg/actuation inhaler Take 2 Puffs by inhalation two (2) times a day. #Acute DVT of LLE in setting of #Lupus   #Anticoagulated on Xarelto  Established with Rheumatology for Lupus monitoring   Taking plaquenil; Cellcept discontinued     #HLD - goal <100; Newly at goal!  Reviewed Statin hx, unclear if needed but has not been using regularly. Interested to stop using given Patient's goal to reduce pill burden and continue with monitoring and focusing on management with heart healthy habits. Lab Results   Component Value Date/Time     LDL, calculated 88 04/04/2022 01:21 PM      #Vit D Def - reviewed dx and recommended supplement dosing          Orders Placed This Encounter    atomoxetine (STRATTERA) 40 mg capsule     Sig: Take 1 Capsule by mouth daily. Can increase dose to 2 capsules by mouth daily if limited improvement in symptoms after 1 week of use.   Indications: attention deficit disorder with hyperactivity     Dispense:  90 Capsule     Refill:  0     Return in about 2 months (around 9/19/2022) for 30 min follow up, medication monitoring. Subjective   Last PCP visit: 6/7/2022  Since last visit:   Any changes in health, procedures, hospital visits, or specialist visits? Denies  Tolerating medications without adverse reactions? Reports good compliance with Rx without notable adverse effects. Current Outpatient Medications   Medication Instructions    albuterol (PROVENTIL HFA, VENTOLIN HFA, PROAIR HFA) 90 mcg/actuation inhaler 2 Puffs, EVERY 4 HOURS AS NEEDED    albuterol (PROVENTIL VENTOLIN) 1.25 mg, Nebulization, EVERY 4 HOURS AS NEEDED    atomoxetine (STRATTERA) 40 mg, Oral, DAILY, Can increase dose to 2 capsules by mouth daily if limited improvement in symptoms after 1 week of use.    famotidine (PEPCID) 20 mg, Oral, 2 TIMES DAILY    Flovent HFA 44 mcg/actuation inhaler 2 Puffs, 2 TIMES DAILY    fluticasone propionate (FLONASE) 50 mcg/actuation nasal spray 2 Sprays, Both Nostrils, DAILY    hydrOXYchloroQUINE (PLAQUENIL) 200 mg, Oral, DAILY    loratadine (CLARITIN) 10 mg tablet loratadine 10 mg tablet  Take 1 tablet every day by oral route    LORazepam (ATIVAN) 0.5 mg, Oral, DAILY AS NEEDED    rivaroxaban (XARELTO) 20 mg, Oral, DAILY      Objective   There were no vitals taken for this visit. Physical Exam  Nursing note reviewed. Constitutional:       General: She is not in acute distress. Pulmonary:      Effort: Pulmonary effort is normal. No respiratory distress. Neurological:      Mental Status: She is alert and oriented to person, place, and time. Psychiatric:         Mood and Affect: Mood is anxious. Behavior: Behavior normal.         Cognition and Memory: Cognition is impaired. On this date 07/19/2022 I have spent 45 minutes reviewing previous notes, test results and face to face with the patient discussing the diagnosis and importance of compliance with the treatment plan as well as documenting on the day of the visit.    Morris Cash, who was evaluated through a synchronous (real-time) audio-video encounter, and/or her healthcare decision maker, is aware that it is a billable service, which includes applicable co-pays, with coverage as determined by her insurance carrier. She provided verbal consent to proceed and patient identification was verified. This visit was conducted pursuant to the emergency declaration under the [address] [place] [address] and the [employer]. A caregiver was present when appropriate. Ability to conduct physical exam was limited. The patient was located at: Home: [address] [id number]  The provider was located at:  [facility]  07/19/2022

## 2022-07-19 NOTE — PATIENT INSTRUCTIONS
Day-to-day coping with ADHD  · Use a detailed daily planner, notebooks, reminder notes, or your smart phone. José Miguel Berry Keeping everything in one place makes it easier to find the reminders you may need. You might want to keep track of appointments and schedules, to do lists, important dates, websites, phone numbers and addresses, meeting notes, and even movies youd like to see or books youd like to read. · Do the most demanding tasks at the time of they day when you feel your energy levels are the highest.  · Exercise your brain. Take a class, do word puzzles, or learn a new language. · Get enough rest and sleep. · Keep moving. Regular physical activity is not only good for your body, but also improves your mood, makes you feel more alert, and decreases tiredness (fatigue). · Eat veggies. Studies have shown that eating more vegetables is linked to keeping brain power as people age. · Set up and follow routines. Try to keep the same daily schedule. · Pick a certain place for commonly lost objects (like keys) and put them there each time. · Try not to multi-task. Focus on one thing at a time. · Avoid alcohol and other agents that might change your mental state and sleeping patterns  · Ask for help when you need it. Friends and loved ones can help with daily tasks to cut down on distractions and help you save mental energy. · Track your memory problems. Keep a diary of when you notice problems and whats going on at the time. Medicines taken, time of day, and the situation youre in might help you figure out what affects your memory. Keeping track of when the problems are most noticeable can also help you prepare. Ken Mcallister know to avoid planning important conversations or appointments during those times. This record will also be useful when you talk with your doctor about these problems.

## 2022-07-25 ENCOUNTER — OFFICE VISIT (OUTPATIENT)
Dept: FAMILY MEDICINE CLINIC | Age: 22
End: 2022-07-25
Payer: MEDICAID

## 2022-07-25 VITALS
HEART RATE: 74 BPM | BODY MASS INDEX: 36.19 KG/M2 | TEMPERATURE: 98.3 F | WEIGHT: 212 LBS | RESPIRATION RATE: 16 BRPM | HEIGHT: 64 IN | SYSTOLIC BLOOD PRESSURE: 96 MMHG | OXYGEN SATURATION: 98 % | DIASTOLIC BLOOD PRESSURE: 64 MMHG

## 2022-07-25 DIAGNOSIS — Z79.899 CONTROLLED SUBSTANCE AGREEMENT SIGNED: ICD-10-CM

## 2022-07-25 DIAGNOSIS — J45.30 MILD PERSISTENT ASTHMA WITHOUT COMPLICATION: ICD-10-CM

## 2022-07-25 DIAGNOSIS — M32.9 SYSTEMIC LUPUS ERYTHEMATOSUS, UNSPECIFIED SLE TYPE, UNSPECIFIED ORGAN INVOLVEMENT STATUS (HCC): ICD-10-CM

## 2022-07-25 DIAGNOSIS — R41.83 BORDERLINE INTELLECTUAL DISABILITY: ICD-10-CM

## 2022-07-25 DIAGNOSIS — F90.9 ATTENTION DEFICIT HYPERACTIVITY DISORDER (ADHD), UNSPECIFIED ADHD TYPE: Primary | ICD-10-CM

## 2022-07-25 DIAGNOSIS — F41.0 PANIC ATTACKS: ICD-10-CM

## 2022-07-25 DIAGNOSIS — Z79.01 ANTICOAGULATED: ICD-10-CM

## 2022-07-25 DIAGNOSIS — F41.8 ANXIETY ABOUT HEALTH: ICD-10-CM

## 2022-07-25 PROCEDURE — 99214 OFFICE O/P EST MOD 30 MIN: CPT | Performed by: STUDENT IN AN ORGANIZED HEALTH CARE EDUCATION/TRAINING PROGRAM

## 2022-07-25 RX ORDER — RIVAROXABAN 10 MG/1
TABLET, FILM COATED ORAL
COMMUNITY
Start: 2022-07-15

## 2022-07-25 RX ORDER — OMEPRAZOLE 40 MG/1
40 CAPSULE, DELAYED RELEASE ORAL DAILY
COMMUNITY
Start: 2022-06-30

## 2022-07-25 NOTE — LETTER
CONTROLLED SUBSTANCE MEDICATION AGREEMENT  Patient Name: Gladys Rocha  Patient YOB: 2000     I understand, that controlled substance medications may be used to help better manage my symptoms and to improve my ability to function at home, work and in social settings. However, I also understand that these medications do have risks, which have been discussed with me, including possible development of physical or psychological dependence. I understand that successful treatment requires mutual trust and honesty between me and my provider. I understand and agree that following this Medication Agreement is necessary in continuing my provider-patient relationship and the success of my treatment plan. Explanation from my Provider: Benefits and Goals of Controlled Substance Medications: There are two potential goals for your treatment: (1) decreased pain and suffering (2) improved daily life functions. There are many possible treatments for your chronic condition(s). Alternatives such as physical therapy, yoga, massage, home daily exercise, meditation, relaxation techniques, injections, chiropractic manipulations, surgery, cognitive therapy, hypnosis and many medications that are not habit-forming may be used. Use of controlled substance medications may be helpful, but they are unlikely to resolve all symptoms or restore all function. Explanation from my Provider: Risks of Controlled Substance Medications:   Opioid pain medications: These medications can lead to problems such as addiction/dependence, sedation, lightheadedness/dizziness, memory issues, falls, constipation, nausea, or vomiting. They may also impair the ability to drive or operate machinery. Additionally, these medications may lower testosterone levels, leading to loss of bone strength, stamina and sex drive.   They may cause problems with breathing, sleep apnea and reduced coughing, which is especially dangerous for patients with lung disease. Overdose or dangerous interactions with alcohol and other medications may occur, leading to death. Hyperalgesia may develop, which means patients receiving opioids for the treatment of pain may become more sensitive to certain painful stimuli, and in some cases, experience pain from ordinarily non-painful stimuli. Women between the ages of 14-53 who could become pregnant should carefully weigh the risks and benefits of opioids with their physicians, as these medications increase the risk of pregnancy complications, including miscarriage,  delivery and stillbirth. It is also possible for babies to be born addicted to opioids. Opioid dependence withdrawal symptoms may include; feelings of uneasiness, increased pain, irritability, belly pain, diarrhea, sweats and goose-flesh. Testosterone replacement therapy:  Potential side effects include increased risk of stroke and heart attack, blood clots, increased blood pressure, increased cholesterol, enlarged prostate, sleep apnea, irritability/aggression and other mood disorders, and decreased fertility. Ladi Gil (2000)             Page 1 of 4    Initials:_______    Benzodiazepines and non-benzodiazepine sleep medications: These medications can lead to problems such as addiction/dependence, sedation, fatigue, lightheadedness, dizziness, incoordination, falls, depression, hallucinations, and impaired judgment, memory and concentration. The ability to drive and operate machinery may also be affected. Abnormal sleep-related behaviors have been reported, including sleepwalking, driving, making telephone calls, eating, or having sex while not fully awake. These medications can suppress breathing and worsen sleep apnea, particularly when combined with alcohol or other sedating medications, potentially leading to death. Dependence withdrawal symptoms may include tremors, anxiety, hallucinations and seizures.    Stimulants:  Common adverse effects include addiction/dependence, increased blood pressure and heart rate, decreased appetite, nausea, involuntary weight loss, insomnia,  irritability, and headaches. These risks may increase when these medications are combined with other stimulants, such as caffeine pills or energy drinks, certain weight loss supplements and oral decongestants. Dependence withdrawal symptoms may include depressed mood, loss of interest, suicidal thoughts, anxiety, fatigue, appetite changes and agitation. I agree and understand that I and my prescriber have the following rights and responsibilities regarding my treatment plan:   1. MY RIGHTS:  To be informed of my treatment and medication plan. To be an active participant in my health and wellbeing. 2. MY RESPONSIBILITY AND UNDERSTANDING FOR USE OF MEDICATIONS   I will take medications at the dose and frequency as directed. For my safety, I will not increase or change how I take my medications without the recommendation of my healthcare provider.  I will actively participate in any program recommended by my provider which may improve function, including social, physical, psychological programs.  I will not take my medications with alcohol or other drugs not prescribed to me. I understand that drinking alcohol with my medications increases the chances of side effects, including reduced breathing rate and could lead to personal injury when operating machinery.  I understand that if I have a history of substance use disorders, including alcohol or other illicit drugs, that I may be at increased risk of addiction to my medications.  I agree to notify my provider immediately if I should become pregnant so that my treatment plan can be adjusted.    I agree and understand that I shall only receive controlled substance medications from the prescriber that signed this agreement unless there is written agreement among other prescribers of controlled substances outlining the responsibility of the medications being prescribed.  I understand that the if the controlled medication is not helping to achieve goals, the dosage may be tapered and no longer prescribed. 3. MY RESPONSIBILITY FOR COMMUNICATION / PRESCRIPTION RENEWALS   I agree that all controlled substance medications that I take will be prescribed only by my provider. If another healthcare provider prescribes me medication in an emergency, I will notify my provider within seventy-two (72) hours. Ladi Gil (2000)             Page 2 of 4    Initials:_______   I will arrange for refills at the prescribed interval ONLY during regular office hours. I will not ask for refills earlier than agreed, after-hours, on holidays or weekends. Refills may take up to 72 hours for processing and prescriptions to reach the pharmacy.  I will inform my other health care providers that I am taking these medications and of the existence of this [id number]. In the event of an emergency, I will provide the same information to the emergency department prescribers.  I will keep my provider updated on the pharmacy I am using for controlled medication prescription filling. 4. MY RESPONSIBILITY FOR PROTECTING MEDICATIONS   I will protect my prescriptions and medications. I understand that lost or misplaced prescriptions will not be replaced.  I will keep medications only for my own use and will not share them with others. I will keep all medications away from children.  I agree that if my medications are adjusted or discontinued, I will properly dispose of any remaining medications. I understand that I will be required to dispose of any remaining controlled medications as, directed by my prescriber, prior to being provided with any prescriptions for other controlled medications.   Medication drop box locations can be found at: [email or web address]  5. MY RESPONSIBILITY WITH ILLEGAL DRUGS    I will not use illegal or street drugs or another person's prescription medications not prescribed to me.  If there are identified addiction type symptoms, then referral to a program may be provided by my provider and I agree to follow through with this recommendation. 6. MY RESPONSIBILITY FOR COOPERATION WITH INVESTIGATIONS   I understand that my provider will comply with any applicable law and may discuss my use and/or possible misuse/abuse of controlled substances and alcohol, as appropriate, with any health care provider involved in my care, pharmacist, or legal authority.  I authorize my provider and pharmacy to cooperate fully with law enforcement agencies (as permitted by law) in the investigation of any possible misuse, sale, or other diversion of my controlled substances.  I agree to waive any applicable privilege or right of privacy or confidentiality with respect to these authorizations. 7. PROVIDERS RIGHT TO MONITOR FOR SAFETY: PRESCRIPTION MONITORING / DRUG TESTING   I consent to drug/toxicology screening and will submit to a drug screen upon my providers request to assure I am only taking the prescribed drugs for my safety monitoring. I understand that a drug screen is a laboratory test in which a sample of my urine, blood or saliva is checked to see what drugs I have been taking. This may entail an observed urine specimen, which means that a nurse or other health care provider may watch me provide urine, and I will cooperate if I am asked to provide an observed specimen. Ladi Gil (2000)             Page 3 of 4    Initials:_______  Juwan Padgett I understand that my provider will check a copy of my State Prescription Monitoring Program () Report in order to safely prescribe medications.      Pill Counts: I consent to pill counts when requested. I may be asked to bring all my prescribed controlled substance medications, in their original bottles, to all of my scheduled appointments. In addition, my provider may ask me to come to the practice at any time for a random pill count. 8. TERMINATION OF THIS AGREEMENT   For my safety, my prescriber has the right to stop prescribing controlled substance medications and may end this agreement.  Conditions that may result in termination of this agreement:  a. I do not show any improvement in pain, or my activity has not improved. b. I develop rapid tolerance or loss of improvement, as described in my treatment plan.  c. I develop significant side effects from the medication. d. My behavior is not consistent with the responsibilities outlined above, thereby causing safety concerns to continue prescribing controlled substance medications. e. I fail to follow the terms of this agreement. f. Other:____________________________     UNDERSTANDING THIS MEDICATION AGREEMENT:    I have read the above and have had all my questions answered. For chronic disease management, I know that my symptoms can be managed with many types of treatments. A chronic medication trial may be part of my treatment, but I must be an active participant in my care. Medication therapy is only one part of my symptom management plan. In some cases, there may be limited scientific evidence to support the chronic use of certain medications to improve symptoms and daily function. Furthermore, in certain circumstances, there may be scientific information that suggests that the use of chronic controlled substances may worsen my symptoms and increase my risk of unintentional death directly related to this medication therapy.   I know that if my provider feels my risk from controlled medications is greater than my benefit, I will have my controlled substance medication(s) compassionately lowered or removed altogether. I further agree to allow this office to contact my HIPAA contact if there are concerns about my safety and use of the controlled medications. I have agreed to use the prescribed controlled substance medications to me as instructed by my provider and as stated in this Medication Agreement. My initial on each page and my signature below shows that I have read each page and I have had the opportunity to ask questions with answers provided by my provider.       Patient Name (Printed): _____________________________________    Patient Signature:  ______________________   Date: _____________      Prescriber Name (Printed): ___________________________________    Prescriber Signature: _____________________  Date: _____________     Lashanda Rene (2000)             Page 4 of 4

## 2022-07-25 NOTE — PROGRESS NOTES
Veronica Encinas is a 23 y.o. female (: 2000) presenting to address:    Chief Complaint   Patient presents with    Follow-up     Acid refulx f/u         Vitals:    22 1132   BP: 96/64   Pulse: 74   Resp: 16   Temp: 98.3 °F (36.8 °C)   TempSrc: Temporal   SpO2: 98%   Weight: 212 lb (96.2 kg)   Height: 5' 4\" (1.626 m)   PainSc:   0 - No pain       Hearing/Vision:   No results found. Learning Assessment:     Learning Assessment 2021   PRIMARY LEARNER Patient   HIGHEST LEVEL OF EDUCATION - PRIMARY LEARNER  -   BARRIERS PRIMARY LEARNER -   CO-LEARNER CAREGIVER -   PRIMARY LANGUAGE ENGLISH   LEARNER PREFERENCE PRIMARY DEMONSTRATION     READING   ANSWERED BY patient    RELATIONSHIP SELF     Depression Screening:     3 most recent PHQ Screens 2022   Little interest or pleasure in doing things Several days   Feeling down, depressed, irritable, or hopeless Several days   Total Score PHQ 2 2   Trouble falling or staying asleep, or sleeping too much Several days   Feeling tired or having little energy Nearly every day   Poor appetite, weight loss, or overeating Several days   Feeling bad about yourself - or that you are a failure or have let yourself or your family down Not at all   Trouble concentrating on things such as school, work, reading, or watching TV Not at all   Moving or speaking so slowly that other people could have noticed; or the opposite being so fidgety that others notice More than half the days   Thoughts of being better off dead, or hurting yourself in some way Not at all   PHQ 9 Score 9   How difficult have these problems made it for you to do your work, take care of your home and get along with others Very difficult     Fall Risk Assessment:     Fall Risk Assessment, last 12 mths 2022   Able to walk? Yes   Fall in past 12 months? 0   Do you feel unsteady?  0   Are you worried about falling 0     Abuse Screening:     Abuse Screening Questionnaire 2022   Do you ever feel afraid of your partner? N   Are you in a relationship with someone who physically or mentally threatens you? N   Is it safe for you to go home? Y     ADL Assessment:   No flowsheet data found. Coordination of Care Questionaire:   1. \"Have you been to the ER, urgent care clinic since your last visit? Hospitalized since your last visit? \" No    2. \"Have you seen or consulted any other health care providers outside of the [address] [place] [address] since your last visit? \" No     3. For patients aged 39-70: Has the patient had a colonoscopy / FIT/ Cologuard? NA - based on age      If the patient is female:    4. For patients aged 41-77: Has the patient had a mammogram within the past 2 years? NA - based on age or sex  See top three    5. For patients aged 21-65: Has the patient had a pap smear? Yes - no Care Gap present    Advanced Directive:   1. Do you have an Advanced Directive? NO    2. Would you like information on Advanced Directives?  NO

## 2022-07-25 NOTE — PROGRESS NOTES
Ladi Gil (: 2000) is a 23 y.o. female, ESTABLISHED patient, here for FOLLOW UP:    ICD-10-CM ICD-9-CM   1. Attention deficit hyperactivity disorder (ADHD), unspecified ADHD type  F90.9 314.01   2. Controlled substance agreement signed  Z79.899 V58.69   3. Panic attacks  F41.0 300.01   4. Borderline intellectual disability  R41.83 V62.89   5. Systemic lupus erythematosus, unspecified SLE type, unspecified organ involvement status ([facility] [address]  M32.9 710.0   6. Anxiety about health  F41.8 300.09   7. Anticoagulated  Z79.01 V58.61   8. Mild persistent asthma without complication  R52.51 824.00     Assessment   Plan     #Body mass index is 36.39 kg/m². - down 9lbs! Counseled on diet and exercise methods. Positive reinforcement provided. Wt Readings from Last 3 Encounters:   22 212 lb (96.2 kg)   22 221 lb 6.4 oz (100.4 kg)   22 224 lb 8 oz (101.8 kg)     #MDD   #Anxiety about health - uncontrolled  With #Panic attacks   #ADHD - Formal Neuropsychiatry dx 2022  #Borderline intellectual learning disability   Reviewed risk/benefit of both preventative daily treatment and rescue (prn) medication. CSA reviewed and signed in office 22. Random UDS 22     Prior treatments:   Felt \"zombie\" like with Zoloft (3mo of use). Xanax helps with sx but reports is too sedating even with use of 1/2 of 0.25mg tablet. CBD gummies help with sx but too sedating.   Poorly tolerated Duloxetine and discontinued after short use  Poorly tolerated Prozac stating felt anxious with every dose she took, stopped before using regularly ~1mo     Current regimen and adjustments:   Fearful of daily preventative medication out of concern for worsening emotional blunting, reviewed after trials with multiple medications from different pharmacologic groups will need to establish care with Psychiatry to help guide further medication management  Lorazepam as rescue agent ONLY (previously used as AED and believes tolerated well). Goal to use less than twice weekly on average. Reviewed to avoid prolonged regular use >6wks to prevent dependence. Effective response in symptoms with one dose. Established with Therapist   Tried 2 days of Atomoxetine and report notable improvement in productivity with use. Fearful that recent SOB was from medication but suspect uncontrolled asthma and encouraged to try again. 03/28/2022 03/28/2022    1  Lorazepam 0.5 Mg Tablet    30.00  30  Ki Neg  [id number]  Matthias (0556)  0  0.50 LME  [place]icaid  [place]     12/03/2021 12/03/2021    1  Alprazolam 0.25 Mg Tablet    10.00  10  [place]  [id number]  Matthias (0556)  0  0.50 LME  Medicaid         Last PDMP Joe as Reviewed:  Review User Review Instant Review Result   Rejisofía Clive 6/7/2022  5:30 PM Reviewed PDMP [1]      #GERD with esophagitis  And #Esophageal stenosis s/p dilation  And #Atrophic gastritis  Established with GI  Rec review of EGD 4/27/22 with \"LA Grade A reflux esophagitis. Rule out Dutta's esophagus. Biopsied. Benign-appearing esophageal stenosis. Dilated. Atrophic gastritis. Biopsied. Normal examined duodenum\" and plan to increase Omeprazole to 40mg and for gastric emptying study and repeat EGD prn for retreatment     #Hx of Seizure - reports 1 prior seizure; Not on AEDs  Reports occurrence of Seizure around time of initial Lupus diagnosis (Late 2018)  Record review EEG 10/18/18: Normal EEG recorded during the awake state. No epileptiform discharges or focal abnormality was seen. This does not preclude a diagnosis of epilepsy. When first diagnosed with Lupus (Late 2018), was having significant migraines (daily), hyperpigmented rash on arms/face, and fatigue - also notes significant memory loss regarding this time of life. Reports initial symptoms improved after starting Lupus treatment (Cellcept, Plaquenil) but has had recent recurrence of neuro sx in last few months and interested in evaluation. No longer on Cellcept.    MRI brain 4/25/22 with no acute infarct, hemorrhage, mass effect, or herniation. Referred to Neurology 3/28/22 Rocco Rodriguez - unable to schedule prior to October 2022      #mild left sinus disease - incidental finding on recent MRI brain  Reviewed optimal use of OTC agents for symptom control and provided with instructions. Instructed to start daily claritin and flonase at least two weeks and monitor for improvement in symptoms. If limited improvement, could consider short abx course. #mild persistent asthma - uncontrolled  Has only been using Albuterol not Flovent. Reviewed instructions and optimal use to improve symptoms. Given difficulty with inhalers, encouraged to find nebulizer machine to utilize nebulizer treatments when having acute illness. Follow up if use of Albuterol inhaler increases to twice weekly or more. Established with Pulmonology; Encouraged to follow up and pursue sleep study to r/o MADDY contributing to sx (STOP Bang screen 3, high risk) Scheduled to see Pulmonologist in August.                              Key COPD Medications                 albuterol (PROVENTIL HFA, VENTOLIN HFA, PROAIR HFA) 90 mcg/actuation inhaler (Taking) Take 2 Puffs by inhalation every four (4) hours as needed. albuterol (PROVENTIL VENTOLIN) 2.5 mg /3 mL (0.083 %) nebu 1.5 mL by Nebulization route every four (4) hours as needed for Wheezing, Shortness of Breath or Respiratory Distress. Flovent HFA 44 mcg/actuation inhaler Take 2 Puffs by inhalation two (2) times a day. #Acute DVT of LLE in setting of #Lupus   #Anticoagulated on Xarelto  Established with Rheumatology for Lupus monitoring   Taking plaquenil; Cellcept discontinued     #HLD - goal <100; Newly at goal!  Reviewed Statin hx, unclear if needed but has not been using regularly. Interested to stop using given Patient's goal to reduce pill burden and continue with monitoring and focusing on management with heart healthy habits.                 Lab Results   Component Value Date/Time     LDL, calculated 88 04/04/2022 01:21 PM      #Vit D Def - reviewed dx and recommended supplement dosing          Orders Placed This [address] (MW)     Standing Status:   Future     Number of Occurrences:   1     Standing Expiration Date:   7/26/2023    omeprazole (PRILOSEC) 40 mg capsule     Sig: Take 40 mg by mouth in the morning. Xarelto 10 mg tablet     Return in about 2 months (around 9/25/2022) for 30 min follow up, medication monitoring. Subjective   Last PCP visit: 7/19/2022    Started Atomoxetine for two days, thought was having SOB with use - felt more productive   Was taking at night and noticing   Waking up with SOB overnight   Only using albuterol, Not using flovent anytime recently (previously only used 2-3 weeks regularly without clear improvement)      Current Outpatient Medications   Medication Instructions    albuterol (PROVENTIL HFA, VENTOLIN HFA, PROAIR HFA) 90 mcg/actuation inhaler 2 Puffs, Inhalation, EVERY 4 HOURS AS NEEDED    albuterol (PROVENTIL VENTOLIN) 1.25 mg, Nebulization, EVERY 4 HOURS AS NEEDED    atomoxetine (STRATTERA) 40 mg, Oral, DAILY, Can increase dose to 2 capsules by mouth daily if limited improvement in symptoms after 1 week of use. Flovent HFA 44 mcg/actuation inhaler 2 Puffs, Inhalation, 2 TIMES DAILY    fluticasone propionate (FLONASE) 50 mcg/actuation nasal spray 2 Sprays, Both Nostrils, DAILY    hydrOXYchloroQUINE (PLAQUENIL) 200 mg, Oral, DAILY    loratadine (CLARITIN) 10 mg tablet loratadine 10 mg tablet  Take 1 tablet every day by oral route    LORazepam (ATIVAN) 0.5 mg, Oral, DAILY AS NEEDED    omeprazole (PRILOSEC) 40 mg, Oral, DAILY    Xarelto 10 mg tablet No dose, route, or frequency recorded.       Objective   Visit Vitals  BP 96/64 (BP 1 Location: Right arm, BP Patient Position: Sitting, BP Cuff Size: Large adult)   Pulse 74   Temp 98.3 °F (36.8 °C) (Temporal)   Resp 16   Ht 5' 4\" (1.626 m) Wt 212 lb (96.2 kg)   LMP  (Within Weeks)   SpO2 98%   BMI 36.39 kg/m²     Physical Exam  Vitals and nursing note reviewed. Constitutional:       General: She is not in acute distress. Interventions: Face mask in place. Cardiovascular:      Rate and Rhythm: Normal rate. Pulses: Normal pulses. Pulmonary:      Effort: Pulmonary effort is normal. No respiratory distress. Neurological:      Mental Status: She is alert and oriented to person, place, and time.       Gait: Gait normal.   Psychiatric:         Mood and Affect: Mood normal.         Behavior: Behavior normal.          Dina Reese DO  Family Medicine  07/25/2022

## 2022-07-29 LAB — REPORT SUMMARY: NORMAL

## 2022-08-03 ENCOUNTER — TELEPHONE (OUTPATIENT)
Dept: FAMILY MEDICINE CLINIC | Age: 22
End: 2022-08-03

## 2022-08-03 NOTE — TELEPHONE ENCOUNTER
Pt called wanting advice on 2 issues. The first issue is in regards to her strattera medication. She states that she has been taking this medication off and on because she does not feel like it has been helping her. She states it makes her nauseous and it seems to make her anxiety worse because she has a fast heart rate. She wanted to know if she could just take some sort of vitamins instead. The second issue is she describes her eyes rolling back and her feeling like she is almost going to faint. She wants to know if that might be related to her medication or the heat. Please advise.

## 2022-08-05 NOTE — TELEPHONE ENCOUNTER
Pt called right back stating that the call was disconnected. I asked her if she had heard any of the note that I read off before the call was disconnected and she said that the last thing that she heard was Dr Callie gonzales and she wanted to know why she needed to go to [facility] when she was just asking for vitamins. I went over the my original message again with her and she stated that she did not have concerns about her eyes rolling and almost fainting. She stated, \"That was a one time thing at my job, It was probably hot. \"   She does still want to know if she can take vitamins, particularly for vitamin d because it was low back in April and she never took vitamins. Is this something that can be advised on or is an appointment still needed?

## 2022-08-05 NOTE — TELEPHONE ENCOUNTER
I would need to speak with her to answer these questions properly. She can schedule follow up after 8/24/22 to discuss. If needs to be seen sooner, can utilize urgent care or emergency services.

## 2022-08-05 NOTE — TELEPHONE ENCOUNTER
I called and spoke to patient and informed her that she would need to schedule an appt  for Dr Nesha Raya to address her concerns. I informed her we could not schedule her an appt until the 25th of this month which she did push back on. I informed her that she could go to urgent care or  emergencies services in the meantime. She then asked me what the message said that was sent to Dr Nesha Raya so I read it off to her. After I was done she did not say anything so I said hello several times and then she presumably hung up on me.

## 2022-08-08 NOTE — TELEPHONE ENCOUNTER
If she is ok she can wait to schedule the follow up. If she has questions still she would need the visit.

## 2022-08-10 ENCOUNTER — HOSPITAL ENCOUNTER (OUTPATIENT)
Dept: PHYSICAL THERAPY | Age: 22
Discharge: HOME | End: 2022-08-10
Payer: MEDICAID

## 2022-08-10 ENCOUNTER — HOSPITAL ENCOUNTER (OUTPATIENT)
Dept: PHYSICAL THERAPY | Age: 22
End: 2022-08-10
Payer: MEDICAID

## 2022-08-10 PROCEDURE — 97162 PT EVAL MOD COMPLEX 30 MIN: CPT

## 2022-08-10 NOTE — PROGRESS NOTES
[address] [address],[facility], [facility] - Phone: [phone]  Fax: [phone] / [facility]  Patient Name: Estelle Nunez : 2000   Medical   Diagnosis: Pain in left thigh [M79.652]  Pain in right thigh [M79.651] Treatment Diagnosis: Pain in left thigh [M79.652]  Pain in right thigh [M79.651]   Onset Date:      Referral Source: Darnell Gutierrez MD Start of Care [facility] [place]): 8/10/2022   Prior Hospitalization: See medical history Provider #: [id number]   Prior Level of Function: Progressive pain and loss of LE function. Comorbidities: Lupus, DVTs, Asthma, anxiety, depression, ADHD, LBP   Medications: Verified on Patient Summary List   The Plan of Care and following information is based on the information from the initial evaluation.   ===========================================================================================  Subjective: Reports standing for a while and walking hurt the legs. She has a Hx of Lupus and prior DVTx2 from immobility, hospitalization. Was on prednisone and gained weight and now back down. Reports she works at [employer] and [employer] and she needs to do PT to be able to stand better and get a note to be able to sit down more. Some days she can stand for 6 hours, but sometimes it's worse. Reports the pain is achy and sometimes throbby. Reports sometimes happens at night. Reports she goes to the gym but she can't do as much and needs more recovery time. Says pain is in the calf a lot, sometimes tight. Report sleep is fair/poor. She is also in nursing school. Assessment / key information:  Pt presents to [facility] at [facility] [place]. with signs and symptoms congruent with a diagnosis of widespread pain and poor activity tolerance, poor standing endurance, concomitant with SLE diagnosis.  This pain affects her ability to stand at work. Patient would benefit from skilled intervention to address the above deficits, improve quality of life and return patient to maximum level of prior function. OBJECTIVE:   GAIT: WNL, functional, non-painful in clinic. *Pt reporting \"tight\" feeling in thighs and calves throughout exam*    MMT:  Hip Flexion: R: 4/5,  L: 4/5  Hip Extension: R: 4-/5, L: 4-/5  Hip ABD: R: 4-/5, L: 4-/5  Knee Extension: R: 4/5, L: 4/5  Knee Flexion: R: 4+/5, L: 4+/5  Calf: 3+/5 bilat    Hip, knee and ankle ROM is WNL: Exception: Ankle DF is 15 deg bilat with reported stiffness. LBP reported in session with some testing. Access Code: [email or web address]  URL: [email or web address]  Date: 08/10/2022  Prepared by: Alfred Ramsay    Exercises  Gastroc Stretch on Wall - 1 x daily - 7 x weekly - 1 sets - 10 reps - 5 hold  Supine Lower Trunk Rotation - 1 x daily - 7 x weekly - 1 sets - 10 reps  Seated Long Arc Quad - 1 x daily - 7 x weekly - 1 sets - 10 reps  Beginner Bridge - 1 x daily - 7 x weekly - 1 sets - 10 reps  Clamshell - 1 x daily - 7 x weekly - 1 sets - 10 reps    ===========================================================================================  Eval Complexity: History HIGH Complexity :3+ comorbidities / personal factors will impact the outcome/ POC ;  Examination  MEDIUM Complexity : 3 Standardized tests and measures addressing body structure, function, activity limitation and / or participation in recreation ; Presentation MEDIUM Complexity : Evolving with changing characteristics ;   Decision Making MEDIUM Complexity : FOTO score of 26-74; Overall Complexity MEDIUM  Problem List: pain affecting function, decrease strength, impaired gait/ balance, and decrease activity tolerance   Treatment Plan may include any combination of the following: Therapeutic exercise, Therapeutic activities, Neuromuscular re-education, Physical agent/modality, Gait/balance training, Manual therapy, Patient education, Self Care training, and Functional mobility training  Patient / Family readiness to learn indicated by: asking questions, trying to perform skills, and interest  Persons(s) to be included in education: patient (P)  Barriers to Learning/Limitations: None  Measures taken, if barriers to learning:    Patient Goal (s): Better strength   Patient self reported health status: good  Rehabilitation Potential: good  Short Term Goals: To be accomplished in  3  weeks  STG1 Pt to report adherence and demonstrate compliance with basic HEP to allow progress between visits  STG2 Pt to report pain <5/10 at worst to allow improved function and QOL  Long Term Goals: To be accomplished in  6  weeks  LTG1 Pt to improve FOTO score to 60/100 indicating improved function in daily tasks  LTG2 Pt to indicate a 4+ \"moderately better\" on the Global Rate Of Change (GROC)  LTG3 Pt to report she is 75% improved in ability to stand at work    Frequency / Duration:   Patient to be seen  2  times per week for 6  weeks  Patient / Caregiver education and instruction: self care and exercises  Therapist Signature: Callie Gonzales PT Date: 1/49/2706   Certification Period: - Time: 9:58 AM   ===========================================================================================  I certify that the above Physical Therapy Services are being furnished while the patient is under my care. I agree with the treatment plan and certify that this therapy is necessary. Physician Signature:        Date:       Time:                                        Bobo Chauhan MD  Please sign and return to [facility] at [facility]. or you may fax the signed copy to [phone]. Thank you. MEDICAID  To ensure your patient receives the highest quality care and to avoid disruption in therapy please sign and return this plan of care within 21 days.  Per Medicaid guidelines if the plan of care is not received within 21 days the patient's care must be put on hold until signed.

## 2022-08-10 NOTE — PROGRESS NOTES
PHYSICAL THERAPY - DAILY TREATMENT NOTE    Patient Name: Harshad Del Rio        Date: 8/10/2022  : 2000   YES Patient  Verified  Visit #:      12  Insurance: Payor: Quinn Tejada / Plan: Deedee Garcia / Product Type: Managed Care Medicaid /      In time: 10:15 Out time: 11:00   Total Treatment Time: 45     Medicare/BCBS Time Tracking (below)   Total Timed Codes (min):  - 1:1 Treatment Time:  45     TREATMENT AREA =  Pain in left thigh [M79.652]  Pain in right thigh [M79.651]    SUBJECTIVE    Pain Level (on 0 to 10 scale):  4  / 10   Medication Changes/New allergies or changes in medical history, any new surgeries or procedures? NO    If yes, update Summary List   Subjective Functional Status/Changes:  []  No changes reported     See POC          OBJECTIVE    NB min Self Care: Reviewed diagnosis, prognosis, therapy progression   Rationale:    Improve understanding of injury and therapy to have realistic expectation of therapy to improve compliance/adherence and satisfaction    Billed With/As:   [] TE   [] TA   [] Neuro   [x] Self Care Patient Education: [x] Review HEP    [] Progressed/Changed HEP based on:   [x] Addressed barriers and behaviors     [] Therapeutic Neuroscience Pain Education, metaphor, reframing, contexts.     [x] Sleep Education   [] Body Mechanics [x] Healing Timeframe     [] Self STM with ball at \"the spot\"  [x] Walking Program/Global Activity   [] other:         Other Objective/Functional Measures:    See POC note     Post Treatment Pain Level (on 0 to 10) scale:   0-4 / 10     ASSESSMENT  Assessment/Changes in Function:     See POC     []  See Progress Note/Recertification   Patient will continue to benefit from skilled PT services to modify and progress therapeutic interventions, address functional mobility deficits, address ROM deficits, address strength deficits, analyze and address soft tissue restrictions, analyze and cue movement patterns, analyze and modify body mechanics/ergonomics and assess and modify postural abnormalities to attain goals per POC. Progress toward goals / Updated goals:    Pt evaluated today.  See POC     PLAN  [x]  Upgrade activities as tolerated YES Continue plan of care   []  Discharge due to :    []  Other:      Therapist: Arnoldo Griffiths PT, DPT, OCS    Date: 8/10/2022 Time: 9:57 AM

## 2022-08-16 ENCOUNTER — TELEPHONE (OUTPATIENT)
Dept: FAMILY MEDICINE CLINIC | Age: 22
End: 2022-08-16

## 2022-08-16 NOTE — TELEPHONE ENCOUNTER
Pt called regarding Vitamins to help with ADHD. Pt has been advised that an appointment will be needed to discuss this issue further with Dr. Alison Monroy. Virtual is okay. Call got disconnected before I could schedule the pt.

## 2022-08-17 ENCOUNTER — APPOINTMENT (OUTPATIENT)
Dept: PHYSICAL THERAPY | Age: 22
End: 2022-08-17
Payer: MEDICAID

## 2022-08-17 ENCOUNTER — VIRTUAL VISIT (OUTPATIENT)
Dept: FAMILY MEDICINE CLINIC | Age: 22
End: 2022-08-17
Payer: MEDICAID

## 2022-08-17 DIAGNOSIS — M32.9 SYSTEMIC LUPUS ERYTHEMATOSUS, UNSPECIFIED SLE TYPE, UNSPECIFIED ORGAN INVOLVEMENT STATUS (HCC): ICD-10-CM

## 2022-08-17 DIAGNOSIS — F90.9 ATTENTION DEFICIT HYPERACTIVITY DISORDER (ADHD), UNSPECIFIED ADHD TYPE: ICD-10-CM

## 2022-08-17 DIAGNOSIS — R41.83 BORDERLINE INTELLECTUAL DISABILITY: ICD-10-CM

## 2022-08-17 DIAGNOSIS — F41.0 PANIC ATTACKS: Primary | ICD-10-CM

## 2022-08-17 DIAGNOSIS — F41.8 ANXIETY ABOUT HEALTH: ICD-10-CM

## 2022-08-17 PROCEDURE — 99214 OFFICE O/P EST MOD 30 MIN: CPT | Performed by: STUDENT IN AN ORGANIZED HEALTH CARE EDUCATION/TRAINING PROGRAM

## 2022-08-17 RX ORDER — ESCITALOPRAM OXALATE 5 MG/1
5 TABLET ORAL DAILY
Qty: 30 TABLET | Refills: 1 | Status: SHIPPED
Start: 2022-08-17 | End: 2022-10-12

## 2022-08-17 NOTE — PROGRESS NOTES
Ladi Gil (: 2000) is a 23 y.o. female, ESTABLISHED patient, here for a VIRTUAL VISIT to address:    ICD-10-CM ICD-9-CM   1. Panic attacks  F41.0 300.01   2. Attention deficit hyperactivity disorder (ADHD), unspecified ADHD type  F90.9 314.01   3. Borderline intellectual disability  R41.83 V62.89   4. Systemic lupus erythematosus, unspecified SLE type, unspecified organ involvement status (RUSTca 75.)  M32.9 710.0   5. Anxiety about health  F41.8 300.09     Assessment   Plan     #Body mass index is 36.39 kg/m². - down 9lbs! Counseled on diet and exercise methods. Positive reinforcement provided. Wt Readings from Last 3 Encounters:   22 212 lb (96.2 kg)   22 221 lb 6.4 oz (100.4 kg)   22 224 lb 8 oz (101.8 kg)     #MDD   #Anxiety about health - uncontrolled  With #Panic attacks   #ADHD - Formal Neuropsychiatry dx 2022  #Borderline intellectual learning disability   Reviewed risk/benefit of both preventative daily treatment and rescue (prn) medication. CSA reviewed and signed in office 22. Random UDS 22     Prior treatments:   Felt \"zombie\" like with Zoloft (3mo of use). Xanax helps with sx but reports is too sedating even with use of 1/2 of 0.25mg tablet. CBD gummies help with sx but too sedating. Poorly tolerated Duloxetine and discontinued after short use  Poorly tolerated Prozac stating felt anxious with every dose she took, stopped before using regularly ~1mo     Current regimen and adjustments:   Fearful of daily preventative medication out of concern for worsening emotional blunting but agreeable to consider trial with new treatment, plan to start low dose Lexapro. Lorazepam as rescue agent ONLY (previously used as AED and believes tolerated well). Goal to use less than twice weekly on average. Reviewed to avoid prolonged regular use >6wks to prevent dependence. Effective response in symptoms with one dose.    Established with Therapist   Tried 2 days of Atomoxetine and report notable improvement in productivity with use. Fearful that recent SOB was from medication but suspect uncontrolled asthma and encouraged to try again. Last PDMP Tanmay Schrader as Reviewed:  Review User Review Instant Review Result   Tisha Cabrales 8/17/2022  3:43 PM Reviewed PDMP [1]     #GERD with esophagitis  And #Esophageal stenosis s/p dilation  And #Atrophic gastritis  Established with GI  Rec review of EGD 4/27/22 with \"LA Grade A reflux esophagitis. Rule out Dutta's esophagus. Biopsied. Benign-appearing esophageal stenosis. Dilated. Atrophic gastritis. Biopsied. Normal examined duodenum\" and plan to increase Omeprazole to 40mg and for gastric emptying study and repeat EGD prn for retreatment     #Hx of Seizure - reports 1 prior seizure; Not on AEDs  Reports occurrence of Seizure around time of initial Lupus diagnosis (Late 2018)  Record review EEG 10/18/18: Normal EEG recorded during the awake state. No epileptiform discharges or focal abnormality was seen. This does not preclude a diagnosis of epilepsy. When first diagnosed with Lupus (Late 2018), was having significant migraines (daily), hyperpigmented rash on arms/face, and fatigue - also notes significant memory loss regarding this time of life. Reports initial symptoms improved after starting Lupus treatment (Cellcept, Plaquenil) but has had recent recurrence of neuro sx in last few months and interested in evaluation. No longer on Cellcept. MRI brain 4/25/22 with no acute infarct, hemorrhage, mass effect, or herniation. Referred to Neurology 3/28/22 Prabha Larry) - unable to schedule prior to October 2022      #mild left sinus disease - incidental finding on recent MRI brain  Reviewed optimal use of OTC agents for symptom control and provided with instructions. Instructed to start daily claritin and flonase at least two weeks and monitor for improvement in symptoms. If limited improvement, could consider short abx course. #mild persistent asthma - uncontrolled  Has only been using Albuterol not Flovent. Reviewed instructions and optimal use to improve symptoms. Given difficulty with inhalers, encouraged to find nebulizer machine to utilize nebulizer treatments when having acute illness. Follow up if use of Albuterol inhaler increases to twice weekly or more. Established with Pulmonology; Encouraged to follow up and pursue sleep study to r/o MADDY contributing to sx (STOP Bang screen 3, high risk) Scheduled to see Pulmonologist in August.                              Key COPD Medications                 albuterol (PROVENTIL HFA, VENTOLIN HFA, PROAIR HFA) 90 mcg/actuation inhaler (Taking) Take 2 Puffs by inhalation every four (4) hours as needed. albuterol (PROVENTIL VENTOLIN) 2.5 mg /3 mL (0.083 %) nebu 1.5 mL by Nebulization route every four (4) hours as needed for Wheezing, Shortness of Breath or Respiratory Distress. Flovent HFA 44 mcg/actuation inhaler Take 2 Puffs by inhalation two (2) times a day. #Acute DVT of LLE in setting of #Lupus   #Anticoagulated on Xarelto  Established with Rheumatology for Lupus monitoring   Taking plaquenil; Cellcept discontinued     #HLD - goal <100; Newly at goal!  Reviewed Statin hx, unclear if needed but has not been using regularly. Interested to stop using given Patient's goal to reduce pill burden and continue with monitoring and focusing on management with heart healthy habits. Lab Results   Component Value Date/Time     LDL, calculated 88 04/04/2022 01:21 PM      #Vit D Def - reviewed dx and recommended supplement dosing          Orders Placed This Encounter    escitalopram oxalate (LEXAPRO) 5 mg tablet     Sig: Take 1 Tablet by mouth daily. Dispense:  30 Tablet     Refill:  1     May increase next dose     Return for asap for labs then follow up in 4 weeks to reevaluate new medication.     Subjective   Last PCP visit: 7/25/2022  Since last visit:   Any changes in health, procedures, hospital visits, or specialist visits? Denies  Tolerating medications without adverse reactions? Reports good compliance with Rx without notable adverse effects. Current Outpatient Medications   Medication Instructions    albuterol (PROVENTIL HFA, VENTOLIN HFA, PROAIR HFA) 90 mcg/actuation inhaler 2 Puffs, Inhalation, EVERY 4 HOURS AS NEEDED    albuterol (PROVENTIL VENTOLIN) 1.25 mg, Nebulization, EVERY 4 HOURS AS NEEDED    escitalopram oxalate (LEXAPRO) 5 mg, Oral, DAILY    Flovent HFA 44 mcg/actuation inhaler 2 Puffs, Inhalation, 2 TIMES DAILY    fluticasone propionate (FLONASE) 50 mcg/actuation nasal spray 2 Sprays, Both Nostrils, DAILY    hydrOXYchloroQUINE (PLAQUENIL) 200 mg, Oral, DAILY    loratadine (CLARITIN) 10 mg tablet loratadine 10 mg tablet  Take 1 tablet every day by oral route    LORazepam (ATIVAN) 0.5 mg, Oral, DAILY AS NEEDED    omeprazole (PRILOSEC) 40 mg, Oral, DAILY    Xarelto 10 mg tablet No dose, route, or frequency recorded. Objective   There were no vitals taken for this visit. Physical Exam  Nursing note reviewed. Constitutional:       General: She is not in acute distress. Pulmonary:      Effort: Pulmonary effort is normal. No respiratory distress. Neurological:      Mental Status: She is alert and oriented to person, place, and time. Psychiatric:         Mood and Affect: Mood normal.         Behavior: Behavior normal.          Ladi Gil, who was evaluated through a synchronous (real-time) audio-video encounter, and/or her healthcare decision maker, is aware that it is a billable service, which includes applicable co-pays, with coverage as determined by her insurance carrier. She provided verbal consent to proceed and patient identification was verified.  This visit was conducted pursuant to the emergency declaration under the [facility], [address] [place] [address] [facility] authority and the [facility] and Response Supplemental Appropriations Act. A caregiver was present when appropriate. Ability to conduct physical exam was limited. The patient was located at home in a state where the provider was licensed to provide care.    Shruthi Dao DO  Family Medicine  08/17/2022

## 2022-08-19 ENCOUNTER — APPOINTMENT (OUTPATIENT)
Dept: PHYSICAL THERAPY | Age: 22
End: 2022-08-19
Payer: MEDICAID

## 2022-08-19 ENCOUNTER — TELEPHONE (OUTPATIENT)
Dept: FAMILY MEDICINE CLINIC | Age: 22
End: 2022-08-19

## 2022-08-19 DIAGNOSIS — F41.8 ANXIETY ABOUT HEALTH: ICD-10-CM

## 2022-08-19 DIAGNOSIS — E55.9 VITAMIN D DEFICIENCY: ICD-10-CM

## 2022-08-19 DIAGNOSIS — M32.9 SYSTEMIC LUPUS ERYTHEMATOSUS, UNSPECIFIED SLE TYPE, UNSPECIFIED ORGAN INVOLVEMENT STATUS (HCC): Primary | ICD-10-CM

## 2022-08-19 NOTE — TELEPHONE ENCOUNTER
Pt called to get scheduled for labs. PT states that  told her to call to be scheduled.    Future Appointments   Date Time Provider Timur Mackey   8/24/2022  2:45 PM Chirag [facility]   8/25/2022  2:15 PM Angie Chase [id number]   8/29/2022  7:40 AM [facility]

## 2022-08-24 ENCOUNTER — HOSPITAL ENCOUNTER (OUTPATIENT)
Dept: PHYSICAL THERAPY | Age: 22
Discharge: HOME | End: 2022-08-24
Payer: MEDICAID

## 2022-08-24 PROCEDURE — 97110 THERAPEUTIC EXERCISES: CPT

## 2022-08-24 PROCEDURE — 97112 NEUROMUSCULAR REEDUCATION: CPT

## 2022-08-24 PROCEDURE — 97530 THERAPEUTIC ACTIVITIES: CPT

## 2022-08-24 NOTE — PROGRESS NOTES
PHYSICAL THERAPY - DAILY TREATMENT NOTE    Patient Name: Angie Bruno        Date: 2022  : 2000   yes Patient  Verified  Visit #:   2   of   12  Insurance: Payor: Blossom Dale / Plan: Tamir Reece / Product Type: Managed Care Medicaid /      In time: 2:50 Out time: 3:25   Total Treatment Time: 35     Medicare/BCBS Time Tracking (below)   Total Timed Codes (min):  35 1:1 Treatment Time:  n/a     TREATMENT AREA =  Pain in left thigh [M79.652]  Pain in right thigh [M79.651]    SUBJECTIVE  Pain Level (on 0 to 10 scale):  6-7  / 10   Medication Changes/New allergies or changes in medical history, any new surgeries or procedures?    no  If yes, update Summary List   Subjective Functional Status/Changes:  []  No changes reported     Patient reports elevated knee pain from running up the clinic's stairs cause she was going to be later for her appt. OBJECTIVE    10 min Therapeutic Exercise:  [x]  See flow sheet   Rationale:      increase ROM and increase strength to improve the patients ability to perform walking/standing activities. 15 min Therapeutic Activity: [x]  See flow sheet   Rationale:    increase ROM and increase strength to improve the patients ability to complete transfers. 10 min Neuromuscular Re-ed: [x]  See flow sheet   Rationale:    increase ROM and increase strength to improve the patients ability to facilitate coordinated muscular contration for ease of movement for prolonged functional mobility activities. Billed With/As:   [x] TE   [] TA   [] Neuro   [] Self Care Patient Education: [x] Review HEP:   [employer] Access Code Date Marino Median 08/10/22     [x] Progressed/Changed HEP based on:   [] Addressed barriers and behaviors     [] Therapeutic Neuroscience Pain Education, metaphor, reframing, contexts.     [] Sleep Education   [] Body Mechanics [] Healing Timeframe     [] Self STM with ball at \"the spot\"  [] Walking Program/Global Activity [] other:          Other Objective/Functional Measures:    Initiated PT session with subjective taken, followed by warm up on NuStep then exercises. Post Treatment Pain Level (on 0 to 10) scale:   0  / 10     ASSESSMENT  Assessment/Changes in Function:     Patient demo good tolerance with initial follow up indicated by no pain following PT session. Will continue to progress exercises per pt's tolerance and POC to improve activity and functional mobility tolerance. []  See Progress Note/Recertification   Patient will continue to benefit from skilled PT services to modify and progress therapeutic interventions, address functional mobility deficits, address ROM deficits, address strength deficits, analyze and address soft tissue restrictions, analyze and cue movement patterns, analyze and modify body mechanics/ergonomics, and assess and modify postural abnormalities to attain remaining goals. Progress toward goals / Updated goals:    Short Term Goals: To be accomplished in  3  weeks  STG1 Pt to report adherence and demonstrate compliance with basic HEP to allow progress between visits current status 08/24: noncompliant  STG2 Pt to report pain <5/10 at worst to allow improved function and QOL  Long Term Goals:  To be accomplished in  6  weeks  LTG1 Pt to improve FOTO score to 60/100 indicating improved function in daily tasks  LTG2 Pt to indicate a 4+ \"moderately better\" on the Global Rate Of Change (GROC)  LTG3 Pt to report      PLAN  [x]  Upgrade activities as tolerated yes Continue plan of care   []  Discharge due to :    []  Other:      Therapist: Orion Fields    Date: 8/24/2022 Time: 6:28 PM     Future Appointments   Date Time Provider Timur Mackey   8/25/2022  2:15 PM Christina Ferrell [facility]   8/29/2022  7:40 AM [facility]

## 2022-08-25 ENCOUNTER — HOSPITAL ENCOUNTER (OUTPATIENT)
Dept: PHYSICAL THERAPY | Age: 22
Discharge: HOME | End: 2022-08-25
Payer: MEDICAID

## 2022-08-25 PROCEDURE — 97112 NEUROMUSCULAR REEDUCATION: CPT

## 2022-08-25 PROCEDURE — 97530 THERAPEUTIC ACTIVITIES: CPT

## 2022-08-25 PROCEDURE — 97110 THERAPEUTIC EXERCISES: CPT

## 2022-08-25 NOTE — PROGRESS NOTES
PHYSICAL THERAPY - DAILY TREATMENT NOTE    Patient Name: Fernie Murillo        Date: 2022  : 2000   yes Patient  Verified  Visit #:   3   of   12  Insurance: Payor: Conception Dapper / Plan: Karla Ramos / Product Type: Managed Care Medicaid /      In time: 2:15 Out time: 2:54   Total Treatment Time: 39     Medicare/BCBS Time Tracking (below)   Total Timed Codes (min):  39 1:1 Treatment Time:  n/a     TREATMENT AREA =  Pain in left thigh [M79.652]  Pain in right thigh [M79.651]    SUBJECTIVE  Pain Level (on 0 to 10 scale):  0 / 10, 7/10 \"stiffness\"   Medication Changes/New allergies or changes in medical history, any new surgeries or procedures?    no  If yes, update Summary List   Subjective Functional Status/Changes:  []  No changes reported     Patient reports having more stiffness than pain. Notices more L>R knee stiffness. States she felt good after yesterday's visit, however she's more stiff today due to having to stand all day at work. OBJECTIVE    10 min Therapeutic Exercise:  [x]  See flow sheet   Rationale:      increase ROM and increase strength to improve the patients ability to perform walking/standing activities. 19 min Therapeutic Activity: [x]  See flow sheet   Rationale:    increase ROM and increase strength to improve the patients ability to complete transfers. 10 min Neuromuscular Re-ed: [x]  See flow sheet   Rationale:    increase ROM and increase strength to improve the patients ability to facilitate coordinated muscular contration for ease of movement for prolonged functional mobility activities. Billed With/As:   [x] TE   [] TA   [] Neuro   [] Self Care Patient Education: [x] Review HEP:   [employer] Access Code Date Anabelle Carty 08/10/22     [x] Progressed/Changed HEP based on:   [] Addressed barriers and behaviors     [] Therapeutic Neuroscience Pain Education, metaphor, reframing, contexts.     [] Sleep Education   [] Body Mechanics [] Healing Timeframe     [] Self STM with ball at \"the spot\"  [] Walking Program/Global Activity   [] other:          Other Objective/Functional Measures:    *initiated PT session with subjective taken, followed by NuStep, then standing exercises then table exercises  *increase time with NuStep to improve cardiovascular endurance  *added longsit hamstring stretch to increase LE flexibility and decrease c/o tightness/stiffness      Post Treatment Pain Level (on 0 to 10) scale:   4  / 10     ASSESSMENT  Assessment/Changes in Function:     Patient continues to respond well with gradual progression of PT interventions by reduced pain levels. Discussed importance with HEP compliance to maximize gains made from PT and self manage symptoms at home. Pt acknowledged understanding     []  See Progress Note/Recertification   Patient will continue to benefit from skilled PT services to modify and progress therapeutic interventions, address functional mobility deficits, address ROM deficits, address strength deficits, analyze and address soft tissue restrictions, analyze and cue movement patterns, analyze and modify body mechanics/ergonomics, and assess and modify postural abnormalities to attain remaining goals. Progress toward goals / Updated goals:    Short Term Goals: To be accomplished in  3  weeks  STG1 Pt to report adherence and demonstrate compliance with basic HEP to allow progress between visits current status 08/24: noncompliant  STG2 Pt to report pain <5/10 at worst to allow improved function and QOL slowly progressing 08/25 indicated by pre (7/10) vs post (4/10) tx pain levels  Long Term Goals:  To be accomplished in  6  weeks  LTG1 Pt to improve FOTO score to 60/100 indicating improved function in daily tasks  LTG2 Pt to indicate a 4+ \"moderately better\" on the Global Rate Of Change (GROC)  LTG3 Pt to report      PLAN  [x]  Upgrade activities as tolerated yes Continue plan of care   []  Discharge due to :    [] Other:      Therapist: Samina Suero    Date: 8/25/2022 Time: 6:36 PM     Future Appointments   Date Time Provider Timur Mackey   8/29/2022  7:40 AM [facility]

## 2022-08-29 ENCOUNTER — APPOINTMENT (OUTPATIENT)
Dept: FAMILY MEDICINE CLINIC | Age: 22
End: 2022-08-29

## 2022-08-29 DIAGNOSIS — M32.9 SYSTEMIC LUPUS ERYTHEMATOSUS, UNSPECIFIED SLE TYPE, UNSPECIFIED ORGAN INVOLVEMENT STATUS (HCC): ICD-10-CM

## 2022-08-29 DIAGNOSIS — E55.9 VITAMIN D DEFICIENCY: ICD-10-CM

## 2022-08-29 DIAGNOSIS — F41.8 ANXIETY ABOUT HEALTH: ICD-10-CM

## 2022-08-30 LAB
25(OH)D3 SERPL-MCNC: 37.4 NG/ML (ref 32–100)
AVG GLU, 10930: 97 MG/DL (ref 91–123)
CHOLEST SERPL-MCNC: 165 MG/DL (ref 110–200)
HBA1C MFR BLD HPLC: 5 % (ref 4.8–5.6)
HDLC SERPL-MCNC: 2.8 MG/DL (ref 0–5)
HDLC SERPL-MCNC: 58 MG/DL
HEP B SURFACE AG SCRN, 006510: NORMAL
LDL/HDL RATIO,LDHD: 1.7
LDLC SERPL CALC-MCNC: 96 MG/DL (ref 50–99)
NON-HDL CHOLESTEROL, 011976: 107 MG/DL
SED RATE (ESR): 38 MM/HR
TRIGL SERPL-MCNC: 57 MG/DL (ref 40–149)
TSH SERPL DL<=0.005 MIU/L-ACNC: 2.09 MCU/ML (ref 0.27–4.2)
VLDLC SERPL CALC-MCNC: 11 MG/DL (ref 8–30)

## 2022-09-08 NOTE — PROGRESS NOTES
Richelle Gil (: 2000) is a 23 y.o. female, PCP Delilah Galo DO, here for URGENT VISIT to address:    ICD-10-CM ICD-9-CM   1. Tuberculosis screening  Z11.1 V74.1     Assessment   Plan     #Tuberculosis screening required  Asymptomatic  Plan to order TB Gold     #Body mass index is 36.39 kg/m². Counseled on diet and exercise methods. Positive reinforcement provided. Wt Readings from Last 3 Encounters:   22 213 lb 3.2 oz (96.7 kg)   22 212 lb (96.2 kg)   22 221 lb 6.4 oz (100.4 kg)     #MDD   #Anxiety about health - uncontrolled  With #Panic attacks   #ADHD - Formal Neuropsychiatry dx 2022  #Borderline intellectual learning disability   Reviewed risk/benefit of both preventative daily treatment and rescue (prn) medication. CSA reviewed and signed in office 22. Random UDS 22     Prior treatments:   Felt \"zombie\" like with Zoloft (3mo of use). Xanax helps with sx but reports is too sedating even with use of 1/2 of 0.25mg tablet. CBD gummies help with sx but too sedating. Poorly tolerated Duloxetine and discontinued after short use  Poorly tolerated Prozac stating felt anxious with every dose she took, stopped before using regularly ~1mo  Tried 2 days of Atomoxetine and report notable improvement in productivity with use. Fearful that recent SOB was from medication but suspect uncontrolled asthma and encouraged to try again. Current regimen and adjustments:   Fearful of daily preventative medication out of concern for worsening emotional blunting but agreeable to consider trial with new treatment, plan to start low dose Lexapro. Lorazepam as rescue agent ONLY (previously used as AED and believes tolerated well). Goal to use less than twice weekly on average. Reviewed to avoid prolonged regular use >6wks to prevent dependence. Effective response in symptoms with one dose.    Established with Therapist      Last PDMP Char Gonzales as Reviewed:  Review User Review Instant Review Result   Omar Saldana 8/17/2022  3:43 PM Reviewed PDMP [1]     #GERD with esophagitis  And #Esophageal stenosis s/p dilation  And #Atrophic gastritis  Established with GI  Rec review of EGD 4/27/22 with \"LA Grade A reflux esophagitis. Rule out Dutta's esophagus. Biopsied. Benign-appearing esophageal stenosis. Dilated. Atrophic gastritis. Biopsied. Normal examined duodenum\" and plan to increase Omeprazole to 40mg and for gastric emptying study and repeat EGD prn for retreatment     #Hx of Seizure - reports 1 prior seizure; Not on AEDs  Reports occurrence of Seizure around time of initial Lupus diagnosis (Late 2018)  Record review EEG 10/18/18: Normal EEG recorded during the awake state. No epileptiform discharges or focal abnormality was seen. This does not preclude a diagnosis of epilepsy. When first diagnosed with Lupus (Late 2018), was having significant migraines (daily), hyperpigmented rash on arms/face, and fatigue - also notes significant memory loss regarding this time of life. Reports initial symptoms improved after starting Lupus treatment (Cellcept, Plaquenil) but has had recent recurrence of neuro sx in last few months and interested in evaluation. No longer on Cellcept. MRI brain 4/25/22 with no acute infarct, hemorrhage, mass effect, or herniation. Referred to Neurology 3/28/22 DR [facility]) - unable to schedule prior to October 2022      #mild left sinus disease - incidental finding on recent MRI brain  Reviewed optimal use of OTC agents for symptom control and provided with instructions. Instructed to start daily claritin and flonase at least two weeks and monitor for improvement in symptoms. If limited improvement, could consider short abx course. #mild persistent asthma - improved  Restarted daily preventative inhaler . Reviewed instructions and optimal use to improve symptoms.  Given difficulty with inhalers, encouraged to find nebulizer machine to utilize nebulizer treatments when having acute illness. Follow up if use of Albuterol inhaler increases to twice weekly or more. Established with Pulmonology; Encouraged to follow up and pursue sleep study to r/o MADDY contributing to sx (STOP Bang screen 3, high risk) Scheduled to see Pulmonologist in August.      Key COPD Medications               albuterol (PROVENTIL VENTOLIN) 2.5 mg /3 mL (0.083 %) nebu (Taking) 1.5 mL by Nebulization route every four (4) hours as needed for Wheezing, Shortness of Breath or Respiratory Distress. Flovent HFA 44 mcg/actuation inhaler (Taking) Take 2 Puffs by inhalation two (2) times a day. albuterol (PROVENTIL HFA, VENTOLIN HFA, PROAIR HFA) 90 mcg/actuation inhaler (Taking) Take 2 Puffs by inhalation every four (4) hours as needed. Advair -21 mcg/actuation inhaler           #Acute DVT of LLE in setting of #Lupus   #Anticoagulated on Xarelto  Established with Rheumatology for Lupus monitoring   Taking plaquenil; Cellcept discontinued     Lab Results   Component Value Date/Time    Sed rate (ESR) 38 (H) 08/29/2022 07:57 AM     #HLD - goal <100  Reviewed Statin hx, unclear if needed but has not been using regularly. Interested to stop using given Patient's goal to reduce pill burden and continue with monitoring and focusing on management with heart healthy habits. Lab Results   Component Value Date/Time    LDL, calculated 96 08/29/2022 07:57 AM     #Vit D Def - reviewed dx and recommended supplement dosing  Lab Results   Component Value Date/Time    VITAMIN D, 25-HYDROXY 37.4 08/29/2022 07:57 AM             Orders Placed This Encounter    QUANTIFERON-TB GOLD PLUS     Standing Status:   Future     Number of Occurrences:   1     Standing Expiration Date:   9/10/2023    Advair -21 mcg/actuation inhaler     Return in about 3 months (around 12/9/2022) for 30 min follow up, routine care with PCP.     Subjective   See A/P  CELESTE 2/7 9/9/2022 7/25/2022 4/14/2022   Feeling nervous, anxious or on edge? 3 2 3   Not being able to stop or control worrying? 3 1 3   CLEESTE-2 Subtotal 6 3 6   Worrying too much about different things? 3 2 3   Trouble relaxing? 1 1 2   Being so restless that it is hard to sit still? 1 0 1   Becoming easily annoyed or irritable? 1 1 1   Feeling afraid as if something awful might happen? 1 1 1   CELESTE-7 Total Score 13 8 14   If you checked off any problems, how difficult have these problems made it for you to do your work, take care of thinks at home, or get along with other people? Extremely difficult Very difficult Extremely difficult     3 most recent PHQ Screens 9/9/2022   Little interest or pleasure in doing things Not at all   Feeling down, depressed, irritable, or hopeless Several days   Total Score PHQ 2 1   Trouble falling or staying asleep, or sleeping too much More than half the days   Feeling tired or having little energy Nearly every day   Poor appetite, weight loss, or overeating More than half the days   Feeling bad about yourself - or that you are a failure or have let yourself or your family down Not at all   Trouble concentrating on things such as school, work, reading, or watching TV Several days   Moving or speaking so slowly that other people could have noticed; or the opposite being so fidgety that others notice Several days   Thoughts of being better off dead, or hurting yourself in some way Not at all   PHQ 9 Score 10   How difficult have these problems made it for you to do your work, take care of your home and get along with others Extremely difficult        Current Outpatient Medications   Medication Instructions    Advair -21 mcg/actuation inhaler No dose, route, or frequency recorded.     albuterol (PROVENTIL HFA, VENTOLIN HFA, PROAIR HFA) 90 mcg/actuation inhaler 2 Puffs, Inhalation, EVERY 4 HOURS AS NEEDED    albuterol (PROVENTIL VENTOLIN) 1.25 mg, Nebulization, EVERY 4 HOURS AS NEEDED    escitalopram oxalate (LEXAPRO) 5 mg, Oral, DAILY Flovent HFA 44 mcg/actuation inhaler 2 Puffs, Inhalation, 2 TIMES DAILY    fluticasone propionate (FLONASE) 50 mcg/actuation nasal spray 2 Sprays, Both Nostrils, DAILY    hydrOXYchloroQUINE (PLAQUENIL) 200 mg, Oral, DAILY    loratadine (CLARITIN) 10 mg tablet loratadine 10 mg tablet  Take 1 tablet every day by oral route    LORazepam (ATIVAN) 0.5 mg, Oral, DAILY AS NEEDED    omeprazole (PRILOSEC) 40 mg, Oral, DAILY    Xarelto 10 mg tablet No dose, route, or frequency recorded. Allergies   Allergen Reactions    Cephalexin Other (comments)     Patient c/o severe chest pain      Metronidazole Other (comments)    Pcn [Penicillins] Hives    Pcn [Penicillins] Hives and Itching      Objective   Visit Vitals  /69 (BP 1 Location: Right arm, BP Patient Position: Sitting, BP Cuff Size: Adult)   Pulse 76   Temp 98.2 °F (36.8 °C) (Temporal)   Resp 16   Ht 5' 4\" (1.626 m)   Wt 213 lb 3.2 oz (96.7 kg)   LMP  (Within Days)   SpO2 99%   BMI 36.60 kg/m²     Physical Exam  Vitals and nursing note reviewed. Constitutional:       General: She is not in acute distress. Interventions: Face mask in place. Cardiovascular:      Rate and Rhythm: Normal rate. Pulses: Normal pulses. Pulmonary:      Effort: Pulmonary effort is normal. No respiratory distress. Neurological:      Mental Status: She is alert and oriented to person, place, and time.       Gait: Gait normal.   Psychiatric:         Mood and Affect: Mood normal.         Behavior: Behavior normal.          Shefali Burciaga,   Family Medicine  09/09/2022

## 2022-09-09 ENCOUNTER — OFFICE VISIT (OUTPATIENT)
Dept: FAMILY MEDICINE CLINIC | Age: 22
End: 2022-09-09
Payer: MEDICAID

## 2022-09-09 VITALS
RESPIRATION RATE: 16 BRPM | BODY MASS INDEX: 36.4 KG/M2 | HEIGHT: 64 IN | TEMPERATURE: 98.2 F | HEART RATE: 76 BPM | OXYGEN SATURATION: 99 % | WEIGHT: 213.2 LBS | SYSTOLIC BLOOD PRESSURE: 105 MMHG | DIASTOLIC BLOOD PRESSURE: 69 MMHG

## 2022-09-09 DIAGNOSIS — Z11.1 TUBERCULOSIS SCREENING: Primary | ICD-10-CM

## 2022-09-09 PROCEDURE — 99214 OFFICE O/P EST MOD 30 MIN: CPT | Performed by: STUDENT IN AN ORGANIZED HEALTH CARE EDUCATION/TRAINING PROGRAM

## 2022-09-09 RX ORDER — FLUTICASONE PROPIONATE AND SALMETEROL XINAFOATE 115; 21 UG/1; UG/1
AEROSOL, METERED RESPIRATORY (INHALATION)
COMMUNITY
Start: 2022-08-29

## 2022-09-09 NOTE — PROGRESS NOTES
Veronica Encinas is a 23 y.o. female (: 2000) presenting to address:    Chief Complaint   Patient presents with    Results     Lab review       Vitals:    22 1022   BP: 105/69   Pulse: 76   Resp: 16   Temp: 98.2 °F (36.8 °C)   TempSrc: Temporal   SpO2: 99%   Weight: 213 lb 3.2 oz (96.7 kg)   Height: 5' 4\" (1.626 m)   PainSc:   6   PainLoc: Head       Hearing/Vision:   No results found. Learning Assessment:     Learning Assessment 2021   PRIMARY LEARNER Patient   HIGHEST LEVEL OF EDUCATION - PRIMARY LEARNER  -   BARRIERS PRIMARY LEARNER -   CO-LEARNER CAREGIVER -   PRIMARY LANGUAGE ENGLISH   LEARNER PREFERENCE PRIMARY DEMONSTRATION     READING   ANSWERED BY patient    RELATIONSHIP SELF     Depression Screening:     3 most recent PHQ Screens 2022   Little interest or pleasure in doing things Several days   Feeling down, depressed, irritable, or hopeless Several days   Total Score PHQ 2 2   Trouble falling or staying asleep, or sleeping too much More than half the days   Feeling tired or having little energy Several days   Poor appetite, weight loss, or overeating Several days   Feeling bad about yourself - or that you are a failure or have let yourself or your family down Not at all   Trouble concentrating on things such as school, work, reading, or watching TV Not at all   Moving or speaking so slowly that other people could have noticed; or the opposite being so fidgety that others notice Several days   Thoughts of being better off dead, or hurting yourself in some way Not at all   PHQ 9 Score 7   How difficult have these problems made it for you to do your work, take care of your home and get along with others Very difficult     Fall Risk Assessment:     Fall Risk Assessment, last 12 mths 2022   Able to walk? Yes   Fall in past 12 months? 0   Do you feel unsteady?  0   Are you worried about falling 0     Abuse Screening:     Abuse Screening Questionnaire 2022   Do you ever feel afraid of your partner? N   Are you in a relationship with someone who physically or mentally threatens you? N   Is it safe for you to go home? Y     ADL Assessment:   No flowsheet data found. Coordination of Care Questionaire:   1. \"Have you been to the ER, urgent care clinic since your last visit? Hospitalized since your last visit? \" No    2. \"Have you seen or consulted any other health care providers outside of the [address] [place] [address] since your last visit? \" No     3. For patients aged 39-70: Has the patient had a colonoscopy / FIT/ Cologuard? NA - based on age      If the patient is female:    4. For patients aged 41-77: Has the patient had a mammogram within the past 2 years? NA - based on age or sex  See top three    5. For patients aged 21-65: Has the patient had a pap smear? Yes - no Care Gap present    Advanced Directive:   1. Do you have an Advanced Directive? NO    2. Would you like information on Advanced Directives?  NO

## 2022-09-09 NOTE — PATIENT INSTRUCTIONS
Where can you find the booster? Right now the only places that have access to the CoVid booster locally are pharmacies. At this website you can select the brand of booster and enter your zip code to find where it is being offered near you. Most sites are [employer], [employer], [employer], etc and fairly easy to schedule online.  (I myself went through this process to schedule my booster so if you have trouble I can help in office.)      [email or web address]

## 2022-10-10 NOTE — PROGRESS NOTES
Ladi Gil (: 2000) is a 23 y.o. female, ESTABLISHED patient, here for FOLLOW UP:      ICD-10-CM ICD-9-CM   1. Anxiety about health  F41.8 300.09   2. Auditory hallucination  R44.0 780.1   3. Visual hallucination  R44.1 368.16   4. Systemic lupus erythematosus, unspecified SLE type, unspecified organ involvement status ([facility] [address]  M32.9 710.0   5. Panic attacks  F41.0 300.01   6. Borderline intellectual disability  R41.83 V62.89   7. Attention deficit hyperactivity disorder (ADHD), unspecified ADHD type  F90.9 314.01   8. Anticoagulated  Z79.01 V58.61     Assessment   Plan     #MDD   #Anxiety about health - uncontrolled  With #Panic attacks   #ADHD - Formal Neuropsychiatry dx 2022  #Borderline intellectual learning disability   Reviewed risk/benefit of both preventative daily treatment and rescue (prn) medication. CSA reviewed and signed in office 22. Random UDS 22     #Auditory/Visual Hallucinations - susp for Schizophrenia  Scary voice making comments/no commands     Prior treatments:   Felt \"zombie\" like with Zoloft (3mo of use). Xanax helps with sx but reports is too sedating even with use of 1/2 of 0.25mg tablet. CBD gummies help with sx but too sedating. Poorly tolerated Duloxetine and discontinued after short use  Poorly tolerated Prozac stating felt anxious with every dose she took, stopped before using regularly ~1mo  Tried 2 days of Atomoxetine and report notable improvement in productivity with use. Fearful that recent SOB was from medication but suspect uncontrolled asthma and encouraged to try again. Stopped Lexapro after ~1mo, 2 weeks without; Nightmares, brainzaps have restarted since discharge     Current regimen and adjustments:   Fearful of daily preventative medication out of concern for worsening emotional blunting   Lorazepam as rescue agent ONLY (previously used as AED and believes tolerated well). Goal to use less than twice weekly on average.  Reviewed to avoid prolonged regular use >6wks to prevent dependence. Effective response in symptoms with one dose. Established with Therapist   Discussed consideration for Bushra Aleks pending Psychiatry eval. Patient agreeable to schedule with Psychiatrist and follow up in office same day to review and reinforce recommendations. 03/28/2022 03/28/2022   1  Lorazepam 0.5 Mg Tablet 30.00  30  Ki Neg  [id number]   Matthias (0556)  0  0.50 LME  [place]  [place]     12/03/2021 12/03/2021   1  Alprazolam 0.25 Mg Tablet              Last PDMP Joe as Reviewed:  Review User Review Instant Review Result   Morgan Bland 10/10/2022  7:37 AM Reviewed PDMP [1]     #Body mass index is 36.39 kg/m². Counseled on diet and exercise methods. Positive reinforcement provided. Wt Readings from Last 3 Encounters:   10/12/22 216 lb (98 kg)   09/09/22 213 lb 3.2 oz (96.7 kg)   07/25/22 212 lb (96.2 kg)     #GERD with esophagitis  And #Esophageal stenosis s/p dilation  And #Atrophic gastritis  Established with GI  Rec review of EGD 4/27/22 with \"LA Grade A reflux esophagitis. Rule out Dutta's esophagus. Biopsied. Benign-appearing esophageal stenosis. Dilated. Atrophic gastritis. Biopsied. Normal examined duodenum\" and plan to increase Omeprazole to 40mg and for gastric emptying study and repeat EGD prn for retreatment     #Hx of Seizure - reports 1 prior seizure; Not on AEDs  Reports occurrence of Seizure around time of initial Lupus diagnosis (Late 2018)  Record review EEG 10/18/18: Normal EEG recorded during the awake state. No epileptiform discharges or focal abnormality was seen. This does not preclude a diagnosis of epilepsy. When first diagnosed with Lupus (Late 2018), was having significant migraines (daily), hyperpigmented rash on arms/face, and fatigue - also notes significant memory loss regarding this time of life.  Reports initial symptoms improved after starting Lupus treatment (Cellcept, Plaquenil) but has had recent recurrence of neuro sx in last few months and interested in evaluation. No longer on Cellcept. MRI brain 4/25/22 with no acute infarct, hemorrhage, mass effect, or herniation. Referred to Neurology 3/28/22 T[facility]) - unable to schedule prior to October 2022      #mild left sinus disease - incidental finding on recent MRI brain  Reviewed optimal use of OTC agents for symptom control and provided with instructions. Instructed to start daily claritin and flonase at least two weeks and monitor for improvement in symptoms. If limited improvement, could consider short abx course. #mild persistent asthma - improved  Restarted daily preventative inhaler . Reviewed instructions and optimal use to improve symptoms. Given difficulty with inhalers, encouraged to find nebulizer machine to utilize nebulizer treatments when having acute illness. Follow up if use of Albuterol inhaler increases to twice weekly or more. Established with Pulmonology; Encouraged to follow up and pursue sleep study to r/o MADDY contributing to sx (STOP Bang screen 3, high risk) Scheduled to see Pulmonologist in August.      Key COPD Medications               albuterol (PROVENTIL VENTOLIN) 2.5 mg /3 mL (0.083 %) nebu (Taking) 1.5 mL by Nebulization route every four (4) hours as needed for Wheezing, Shortness of Breath or Respiratory Distress. Flovent HFA 44 mcg/actuation inhaler (Taking) Take 2 Puffs by inhalation two (2) times a day. albuterol (PROVENTIL HFA, VENTOLIN HFA, PROAIR HFA) 90 mcg/actuation inhaler (Taking) Take 2 Puffs by inhalation every four (4) hours as needed.     Advair -21 mcg/actuation inhaler           #Acute DVT of LLE in setting of #Lupus   #Anticoagulated on Xarelto  Established with Rheumatology for Lupus monitoring   Taking plaquenil; Cellcept discontinued     Lab Results   Component Value Date/Time    Sed rate (ESR) 38 (H) 08/29/2022 07:57 AM     #HLD - goal <100  Reviewed Statin hx, unclear if needed but has not been using regularly. Interested to stop using given Patient's goal to reduce pill burden and continue with monitoring and focusing on management with heart healthy habits. Lab Results   Component Value Date/Time    LDL, calculated 96 08/29/2022 07:57 AM     #Vit D Def - reviewed dx and recommended supplement dosing  Lab Results   Component Value Date/Time    VITAMIN D, 25-HYDROXY 37.4 08/29/2022 07:57 AM             Orders Placed This Encounter    buPROPion XL (WELLBUTRIN XL) 150 mg tablet     Sig: Take 1 Tablet by mouth Every morning. Dispense:  90 Tablet     Refill:  1     Return for same day as Psychiatry visit to review , 30 min follow up, schedule Monday or Tuesday to do PPD. Subjective   Last PCP visit: 9/9/2022  Since last visit:   Any changes in health, procedures, hospital visits, or specialist visits? See A/P  Tolerating medications without adverse reactions? Reports good compliance with Rx without notable adverse effects. Current Outpatient Medications   Medication Instructions    Advair -21 mcg/actuation inhaler No dose, route, or frequency recorded. albuterol (PROVENTIL HFA, VENTOLIN HFA, PROAIR HFA) 90 mcg/actuation inhaler 2 Puffs, Inhalation, EVERY 4 HOURS AS NEEDED    albuterol (PROVENTIL VENTOLIN) 1.25 mg, Nebulization, EVERY 4 HOURS AS NEEDED    buPROPion XL (WELLBUTRIN XL) 150 mg, Oral, EVERY MORNING    Flovent HFA 44 mcg/actuation inhaler 2 Puffs, 2 TIMES DAILY    fluticasone propionate (FLONASE) 50 mcg/actuation nasal spray 2 Sprays, Both Nostrils, DAILY    hydrOXYchloroQUINE (PLAQUENIL) 200 mg, Oral, DAILY    loratadine (CLARITIN) 10 mg tablet loratadine 10 mg tablet  Take 1 tablet every day by oral route    LORazepam (ATIVAN) 0.5 mg, Oral, DAILY AS NEEDED    omeprazole (PRILOSEC) 40 mg, Oral, DAILY    Xarelto 10 mg tablet No dose, route, or frequency recorded.       Objective   Visit Vitals  BP 95/68 (BP 1 Location: Right arm, BP Patient Position: Sitting, BP Cuff Size: Large adult)   Pulse 68   Temp 98 °F (36.7 °C) (Temporal)   Resp 16   Ht 5' 4\" (1.626 m)   Wt 216 lb (98 kg)   LMP  (Within Months)   SpO2 98%   BMI 37.08 kg/m²     Physical Exam  Vitals and nursing note reviewed. Constitutional:       General: She is not in acute distress. Interventions: Face mask in place. Cardiovascular:      Rate and Rhythm: Normal rate. Pulses: Normal pulses. Pulmonary:      Effort: Pulmonary effort is normal. No respiratory distress. Neurological:      Mental Status: She is alert and oriented to person, place, and time. Gait: Gait normal.   Psychiatric:         Attention and Perception: She is attentive. Mood and Affect: Mood is anxious and depressed. Behavior: Behavior normal. Behavior is cooperative.           Erick Flores DO  Family Medicine  10/12/2022

## 2022-10-12 ENCOUNTER — OFFICE VISIT (OUTPATIENT)
Dept: FAMILY MEDICINE CLINIC | Age: 22
End: 2022-10-12
Payer: MEDICAID

## 2022-10-12 ENCOUNTER — TELEPHONE (OUTPATIENT)
Dept: FAMILY MEDICINE CLINIC | Age: 22
End: 2022-10-12

## 2022-10-12 VITALS
DIASTOLIC BLOOD PRESSURE: 68 MMHG | BODY MASS INDEX: 36.88 KG/M2 | RESPIRATION RATE: 16 BRPM | TEMPERATURE: 98 F | HEIGHT: 64 IN | SYSTOLIC BLOOD PRESSURE: 95 MMHG | HEART RATE: 68 BPM | WEIGHT: 216 LBS | OXYGEN SATURATION: 98 %

## 2022-10-12 DIAGNOSIS — M32.9 SYSTEMIC LUPUS ERYTHEMATOSUS, UNSPECIFIED SLE TYPE, UNSPECIFIED ORGAN INVOLVEMENT STATUS (HCC): ICD-10-CM

## 2022-10-12 DIAGNOSIS — F41.0 PANIC ATTACKS: ICD-10-CM

## 2022-10-12 DIAGNOSIS — Z79.01 ANTICOAGULATED: ICD-10-CM

## 2022-10-12 DIAGNOSIS — R41.83 BORDERLINE INTELLECTUAL DISABILITY: ICD-10-CM

## 2022-10-12 DIAGNOSIS — R44.0 AUDITORY HALLUCINATION: ICD-10-CM

## 2022-10-12 DIAGNOSIS — F90.9 ATTENTION DEFICIT HYPERACTIVITY DISORDER (ADHD), UNSPECIFIED ADHD TYPE: ICD-10-CM

## 2022-10-12 DIAGNOSIS — F41.8 ANXIETY ABOUT HEALTH: Primary | ICD-10-CM

## 2022-10-12 DIAGNOSIS — R44.1 VISUAL HALLUCINATION: ICD-10-CM

## 2022-10-12 PROCEDURE — 99214 OFFICE O/P EST MOD 30 MIN: CPT | Performed by: STUDENT IN AN ORGANIZED HEALTH CARE EDUCATION/TRAINING PROGRAM

## 2022-10-12 RX ORDER — BUPROPION HYDROCHLORIDE 150 MG/1
150 TABLET ORAL EVERY MORNING
Qty: 90 TABLET | Refills: 1 | Status: SHIPPED | OUTPATIENT
Start: 2022-10-12

## 2022-10-12 NOTE — PROGRESS NOTES
Toyin Huang is a 23 y.o. female (: 2000) presenting to address:    Chief Complaint   Patient presents with    Medication Evaluation       Vitals:    10/12/22 0728   BP: 95/68   Pulse: 68   Resp: 16   Temp: 98 °F (36.7 °C)   TempSrc: Temporal   SpO2: 98%   Weight: 216 lb (98 kg)   Height: 5' 4\" (1.626 m)   PainSc:   0 - No pain       Hearing/Vision:   No results found. Learning Assessment:     Learning Assessment 2021   PRIMARY LEARNER Patient   HIGHEST LEVEL OF EDUCATION - PRIMARY LEARNER  -   BARRIERS PRIMARY LEARNER -   CO-LEARNER CAREGIVER -   PRIMARY LANGUAGE ENGLISH   LEARNER PREFERENCE PRIMARY DEMONSTRATION     READING   ANSWERED BY patient    RELATIONSHIP SELF     Depression Screening:     3 most recent PHQ Screens 10/12/2022   Little interest or pleasure in doing things Not at all   Feeling down, depressed, irritable, or hopeless Not at all   Total Score PHQ 2 0   Trouble falling or staying asleep, or sleeping too much -   Feeling tired or having little energy -   Poor appetite, weight loss, or overeating -   Feeling bad about yourself - or that you are a failure or have let yourself or your family down -   Trouble concentrating on things such as school, work, reading, or watching TV -   Moving or speaking so slowly that other people could have noticed; or the opposite being so fidgety that others notice -   Thoughts of being better off dead, or hurting yourself in some way -   PHQ 9 Score -   How difficult have these problems made it for you to do your work, take care of your home and get along with others -     Fall Risk Assessment:     Fall Risk Assessment, last 12 mths 2022   Able to walk? Yes   Fall in past 12 months? 0   Do you feel unsteady? 0   Are you worried about falling 0     Abuse Screening:     Abuse Screening Questionnaire 2022   Do you ever feel afraid of your partner? N   Are you in a relationship with someone who physically or mentally threatens you?  N   Is it safe for you to go home? Y     ADL Assessment:   No flowsheet data found. Coordination of Care Questionaire:   1. \"Have you been to the ER, urgent care clinic since your last visit? Hospitalized since your last visit? \"  Seen at [facility] for left hand cut on 10/10.    2. \"Have you seen or consulted any other health care providers outside of the [address] [place] [address] since your last visit? \" No     3. For patients aged 39-70: Has the patient had a colonoscopy / FIT/ Cologuard? NA - based on age      If the patient is female:    4. For patients aged 41-77: Has the patient had a mammogram within the past 2 years? NA - based on age or sex  See top three    5. For patients aged 21-65: Has the patient had a pap smear? Yes - no Care Gap present    Advanced Directive:   1. Do you have an Advanced Directive? NO    2. Would you like information on Advanced Directives?  NO

## 2022-10-12 NOTE — TELEPHONE ENCOUNTER
It looks like the prescription was cancelled at [phone] before the pharmacy opens at 0900. Spoke to Alex Knight from [facility] and states that they did not received the fax until after 10am. I informed that pharmacist that we discontinued the medication at [phone] per our system and just want to clarify where it went wrong. Pharmacist then states, \"Y'all have to take responsibility because it wasn't sent until after the pt already picked up the medication\". Pharmacist has been informed that we're only calling to get clarification and to document the event. Pharmacist states that she will be calling the pt. Would you like for the pt to continue taking this medication? Please advise.

## 2022-10-12 NOTE — TELEPHONE ENCOUNTER
Pharmacy called to inform us that they did receive the cancellation request for Lexapro but that the patient had already picked up that medication today.

## 2022-10-14 NOTE — TELEPHONE ENCOUNTER
Can you confirm that Ladi is no longer taking Lexapro? We discussed her not taking it during the visit and I provided her with an AVS showing that Wellbutrin had been sent to her pharmacy. She can start the new medication Wellbutrin when she picks it up.

## 2022-10-14 NOTE — TELEPHONE ENCOUNTER
Pt states that someone else had  the medication on her behalf and not aware that she no longer takes it. Pt states that she is not going take the medication and will start taking her Wellbutrin. Advised the pt to take the Lexapro medication back to the pharmacy or bring it to the office to properly discard the medication. She also has been advised not to discard the medication by flushing it down the toilet. Pt verbalized understanding and states that she will take it to the pharmacy when she can.

## 2022-10-17 ENCOUNTER — TELEPHONE (OUTPATIENT)
Dept: FAMILY MEDICINE CLINIC | Age: 22
End: 2022-10-17

## 2022-10-17 NOTE — TELEPHONE ENCOUNTER
Pt called wanting to know if it would be safe for her to take a magnesium supplement alongside her wellbutrin. She also states that her college classes do not need paperwork to be filled out to take a leave of absence.

## 2022-10-24 ENCOUNTER — TELEPHONE (OUTPATIENT)
Dept: FAMILY MEDICINE CLINIC | Age: 22
End: 2022-10-24

## 2022-10-24 NOTE — TELEPHONE ENCOUNTER
Pt called with medication questions. Would like to know if she could take a supplement, L-THEANINE, with her antidepressants. *supplemant is not FDA approved.

## 2022-10-31 ENCOUNTER — PATIENT MESSAGE (OUTPATIENT)
Dept: FAMILY MEDICINE CLINIC | Age: 22
End: 2022-10-31

## 2022-10-31 NOTE — TELEPHONE ENCOUNTER
I responded to Patient via [employer]. Here's my vitamin/supplement recommendations for any patient questions that come up if you want to save it as a smart set     There aren't many guidelines on recommendations for supplements. My general recommendation is if there are studies on the supplements quality, efficacy, and safety (and it is not costing you a fortune) then it is something worth trying and if helpful worth continuing. I wanted to share this free site with you which does a good job of organizing the research we have available for supplement and vitamin treatments.      Https://medlineplus.gov/druginfo/herb_All.html

## 2022-11-04 ENCOUNTER — TELEPHONE (OUTPATIENT)
Dept: FAMILY MEDICINE CLINIC | Age: 22
End: 2022-11-04

## 2022-11-04 NOTE — TELEPHONE ENCOUNTER
Pt called to get scheduled with PCP for Ed F/U. Informed pt that Dr Sondra Carrillo didn't have any openings until Nov 18. Pt said not to worry about it and promptly hung up.

## 2022-11-04 NOTE — TELEPHONE ENCOUNTER
Pt called to get scheduled with PCP for Ed F/U. Informed pt that Dr Solares didn't have any openings until Nov 18. Pt said not to worry about it and promptly hung up.

## 2022-11-07 ENCOUNTER — TELEPHONE (OUTPATIENT)
Dept: FAMILY MEDICINE CLINIC | Age: 22
End: 2022-11-07

## 2022-11-07 NOTE — TELEPHONE ENCOUNTER
----- Message from Isonasmaxmieat 2 sent at 11/7/2022 11:27 AM EST -----  Subject: Appointment Request    Reason for Call: Established Patient Appointment needed: Urgent (Patient   Request) ED Follow Up Visit    QUESTIONS    Reason for appointment request? No appointments available during search     Additional Information for Provider? Patient requests appointment ASAP for   ER follow up. Nausea- ER gave her a shot that she had an allergic reaction   to it. Went back to the ER for the allergic reaction 11/5. Tested positive   for Flu-A 11/6.  Also has seen her psychiatrist and was put on new meds   that she hasn't started yet and Dr. Glen Hanley requested she make appointment   after see psych-Wellbutrin is not working and psych said she can't take it   with new meds but needs to figure out how to taper off.   ---------------------------------------------------------------------------  --------------  Marlon Andres [facility]  [phone]; OK to leave message on voicemail  ---------------------------------------------------------------------------  --------------  SCRIPT ANSWERS  COVID Screen: Rick Medina

## 2022-11-07 NOTE — TELEPHONE ENCOUNTER
----- Message from Maddy Aguillon sent at 11/7/2022 11:27 AM EST -----  Subject: Appointment Request    Reason for Call: Established Patient Appointment needed: Urgent (Patient   Request) ED Follow Up Visit    QUESTIONS    Reason for appointment request? No appointments available during search     Additional Information for Provider? Patient requests appointment ASAP for   ER follow up. Nausea- ER gave her a shot that she had an allergic reaction   to it. Went back to the ER for the allergic reaction 11/5. Tested positive   for Flu-A 11/6. Also has seen her psychiatrist and was put on new meds   that she hasn't started yet and Dr. Solares requested she make appointment   after see psych-Wellbutrin is not working and psych said she can't take it   with new meds but needs to figure out how to taper off.   ---------------------------------------------------------------------------  --------------  CALL BACK INFO  [phone]; OK to leave message on voicemail  ---------------------------------------------------------------------------  --------------  SCRIPT ANSWERS  COVID Screen: Green

## 2022-11-09 ENCOUNTER — VIRTUAL VISIT (OUTPATIENT)
Dept: FAMILY MEDICINE CLINIC | Age: 22
End: 2022-11-09
Payer: MEDICAID

## 2022-11-09 DIAGNOSIS — Z09 HOSPITAL DISCHARGE FOLLOW-UP: Primary | ICD-10-CM

## 2022-11-09 DIAGNOSIS — Z79.01 ANTICOAGULATED: ICD-10-CM

## 2022-11-09 DIAGNOSIS — R44.0 AUDITORY HALLUCINATION: ICD-10-CM

## 2022-11-09 DIAGNOSIS — J09.X2 INFLUENZA A (H5N1): ICD-10-CM

## 2022-11-09 DIAGNOSIS — F41.0 PANIC ATTACKS: ICD-10-CM

## 2022-11-09 DIAGNOSIS — F41.8 ANXIETY ABOUT HEALTH: ICD-10-CM

## 2022-11-09 DIAGNOSIS — F90.9 ATTENTION DEFICIT HYPERACTIVITY DISORDER (ADHD), UNSPECIFIED ADHD TYPE: ICD-10-CM

## 2022-11-09 DIAGNOSIS — M32.9 SYSTEMIC LUPUS ERYTHEMATOSUS, UNSPECIFIED SLE TYPE, UNSPECIFIED ORGAN INVOLVEMENT STATUS (HCC): ICD-10-CM

## 2022-11-09 DIAGNOSIS — R41.83 BORDERLINE INTELLECTUAL DISABILITY: ICD-10-CM

## 2022-11-09 PROCEDURE — 99215 OFFICE O/P EST HI 40 MIN: CPT | Performed by: STUDENT IN AN ORGANIZED HEALTH CARE EDUCATION/TRAINING PROGRAM

## 2022-11-09 NOTE — PROGRESS NOTES
Ladi Gil (: 2000) is a 23 y.o. female, ESTABLISHED patient, here for FOLLOW UP:    ICD-10-CM ICD-9-CM   1. Hospital discharge follow-up  Z09 V67.59   2. Influenza A (H5N1)  J09. X2 488.02   3. Anxiety about health  F41.8 300.09   4. Auditory hallucination  R44.0 780.1   5. Systemic lupus erythematosus, unspecified SLE type, unspecified organ involvement status ([facility] [address]  M32.9 710.0   6. Panic attacks  F41.0 300.01   7. Borderline intellectual disability  R41.83 V62.89   8. Attention deficit hyperactivity disorder (ADHD), unspecified ADHD type  F90.9 314.01   9. Anticoagulated  Z79.01 V58.61     Assessment   Plan     #Rec review ED visit 11/3,  related to recent #Flu A - improved   Discussed new allergic reaction to droperidol and added to allergy list     #MDD   #Anxiety about health - uncontrolled  With #Panic attacks   #ADHD - Formal Neuropsychiatry dx 2022  #Borderline intellectual learning disability   Reviewed risk/benefit of both preventative daily treatment and rescue (prn) medication. CSA reviewed and signed in office 22. Random UDS 22     #Auditory/Visual Hallucinations - susp for Schizophrenia  Scary voice making comments/no commands     Prior treatments:   Felt \"zombie\" like with Zoloft (3mo of use). Xanax helps with sx but reports is too sedating even with use of 1/2 of 0.25mg tablet. CBD gummies help with sx but too sedating. Poorly tolerated Duloxetine and discontinued after short use  Poorly tolerated Prozac stating felt anxious with every dose she took, stopped before using regularly ~1mo  Tried 2 days of Atomoxetine and report notable improvement in productivity with use. Fearful that recent SOB was from medication but suspect uncontrolled asthma and encouraged to try again.    Stopped Lexapro after ~1mo, 2 weeks without; Nightmares, brainzaps have restarted since discharge   Stopped Wellbutrin after ~1mo, initially thought was helpful     Current regimen and adjustments:   Fearful of daily preventative medication out of concern for worsening emotional blunting   Lorazepam as rescue agent ONLY (previously used as AED and believes tolerated well). Goal to use less than twice weekly on average. Reviewed to avoid prolonged regular use >6wks to prevent dependence. Effective response in symptoms with one dose. Established with Therapist   Established with Psychiatrist with recommendation to start Abilify and Buspar prn. Reviewed safety and indications for Abilify and encouraged to try       03/28/2022 03/28/2022   1  Lorazepam 0.5 Mg Tablet 30.00  30  Ki Neg  [id number]   Matthias (0556)  0  0.50 LME  [place]  [place]     12/03/2021 12/03/2021   1  Alprazolam 0.25 Mg Tablet              Last PDMP Joe as Reviewed:  Review User Review Instant Review Result   Patti Medley 11/8/2022  8:50 PM Reviewed PDMP [1]     #Body mass index is 36.39 kg/m². Counseled on diet and exercise methods. Positive reinforcement provided. Wt Readings from Last 3 Encounters:   10/12/22 216 lb (98 kg)   09/09/22 213 lb 3.2 oz (96.7 kg)   07/25/22 212 lb (96.2 kg)     #GERD with esophagitis  And #Esophageal stenosis s/p dilation  And #Atrophic gastritis  Established with GI  Rec review of EGD 4/27/22 with \"LA Grade A reflux esophagitis. Benign-appearing esophageal stenosis. Dilated. Atrophic gastritis. \" and plan to increase Omeprazole to 40mg and for gastric emptying study and repeat EGD prn for retreatment     #Hx of Seizure - reports 1 prior seizure; Not on AEDs  Reports occurrence of Seizure around time of initial Lupus diagnosis (Late 2018)  Record review EEG 10/18/18: Normal EEG recorded during the awake state. No epileptiform discharges or focal abnormality was seen. This does not preclude a diagnosis of epilepsy.     When first diagnosed with Lupus (Late 2018), was having significant migraines (daily), hyperpigmented rash on arms/face, and fatigue - also notes significant memory loss regarding this time of life. Reports initial symptoms improved after starting Lupus treatment (Cellcept, Plaquenil) but has had recent recurrence of neuro sx in last few months and interested in evaluation. No longer on Cellcept. MRI brain 4/25/22 with no acute infarct, hemorrhage, mass effect, or herniation. Referred to Neurology 3/28/22 Tiffany ) - unable to schedule prior to October 2022      #mild left sinus disease - incidental finding on recent MRI brain  Reviewed optimal use of OTC agents for symptom control and provided with instructions. Instructed to start daily claritin and flonase at least two weeks and monitor for improvement in symptoms. If limited improvement, could consider short abx course. #mild persistent asthma - improved  Restarted daily preventative inhaler . Reviewed instructions and optimal use to improve symptoms. Given difficulty with inhalers, encouraged to find nebulizer machine to utilize nebulizer treatments when having acute illness. Follow up if use of Albuterol inhaler increases to twice weekly or more. Established with Pulmonology; Encouraged to follow up and pursue sleep study to r/o MADDY contributing to sx (STOP Bang screen 3, high risk)     Key COPD Medications               albuterol (PROVENTIL VENTOLIN) 2.5 mg /3 mL (0.083 %) nebu (Taking) 1.5 mL by Nebulization route every four (4) hours as needed for Wheezing, Shortness of Breath or Respiratory Distress. Flovent HFA 44 mcg/actuation inhaler (Taking) Take 2 Puffs by inhalation two (2) times a day. albuterol (PROVENTIL HFA, VENTOLIN HFA, PROAIR HFA) 90 mcg/actuation inhaler (Taking) Take 2 Puffs by inhalation every four (4) hours as needed.     Advair -21 mcg/actuation inhaler           #Acute DVT of LLE in setting of #Lupus   #Anticoagulated on Xarelto  Established with Rheumatology for Lupus monitoring   Taking plaquenil; Cellcept discontinued     Lab Results   Component Value Date/Time    Sed rate (ESR) 38 (H) 08/29/2022 07:57 AM     #HLD - goal <100  Reviewed Statin hx, unclear if needed but has not been using regularly. Interested to stop using given Patient's goal to reduce pill burden and continue with monitoring and focusing on management with heart healthy habits. Lab Results   Component Value Date/Time    LDL, calculated 96 08/29/2022 07:57 AM     #Vit D Def - reviewed dx and recommended supplement dosing  Lab Results   Component Value Date/Time    VITAMIN D, 25-HYDROXY 37.4 08/29/2022 07:57 AM               No orders of the defined types were placed in this encounter. Return in about 6 weeks (around 12/21/2022) for 30 min follow up, Virtual OK. Subjective   Last PCP visit: 10/12/2022  Since last visit:   Any changes in health, procedures, hospital visits, or specialist visits? See A/P  Tolerating medications without adverse reactions? Reports good compliance with Rx without notable adverse effects except stopped Wellbutrin     Current Outpatient Medications   Medication Instructions    Advair -21 mcg/actuation inhaler No dose, route, or frequency recorded. albuterol (PROVENTIL HFA, VENTOLIN HFA, PROAIR HFA) 90 mcg/actuation inhaler 2 Puffs, Inhalation, EVERY 4 HOURS AS NEEDED    albuterol (PROVENTIL VENTOLIN) 1.25 mg, Nebulization, EVERY 4 HOURS AS NEEDED    Flovent HFA 44 mcg/actuation inhaler 2 Puffs, 2 TIMES DAILY    fluticasone propionate (FLONASE) 50 mcg/actuation nasal spray 2 Sprays, Both Nostrils, DAILY    hydrOXYchloroQUINE (PLAQUENIL) 200 mg, Oral, DAILY    loratadine (CLARITIN) 10 mg tablet loratadine 10 mg tablet  Take 1 tablet every day by oral route    LORazepam (ATIVAN) 0.5 mg, Oral, DAILY AS NEEDED    omeprazole (PRILOSEC) 40 mg, Oral, DAILY    Xarelto 10 mg tablet No dose, route, or frequency recorded. Objective   There were no vitals taken for this visit. Physical Exam  Nursing note reviewed. Constitutional:       General: She is not in acute distress.   Pulmonary: Effort: Pulmonary effort is normal. No respiratory distress. Neurological:      Mental Status: She is alert and oriented to person, place, and time. Psychiatric:         Attention and Perception: Attention normal.         Mood and Affect: Mood is anxious. Affect is tearful.          Speech: Speech normal.         Behavior: Behavior normal.          Lucinda Joseph DO  Family Medicine  11/09/2022

## 2022-11-11 ENCOUNTER — TELEPHONE (OUTPATIENT)
Dept: FAMILY MEDICINE CLINIC | Age: 22
End: 2022-11-11

## 2022-11-11 RX ORDER — DOXYCYCLINE 100 MG/1
100 CAPSULE ORAL 2 TIMES DAILY
Qty: 10 CAPSULE | Refills: 0 | Status: SHIPPED | OUTPATIENT
Start: 2022-11-11 | End: 2022-11-16

## 2022-11-11 NOTE — TELEPHONE ENCOUNTER
Dr. Charan Mckeon received a notification that Ms. Gil called the On-Call line regarding congestion. I called and spoke to the pt. She states that her congestion has been really bad this past 2 days to the point that she's \"barely breathing\". Pt was complaining of headache and sinus pressure. Pt states that she has been using nasal sprays, Benadryl, her allergy medication, and mucinex. Pt would like to know if there's anything else she can do to help with congestion. Please advise.

## 2022-11-15 ENCOUNTER — TELEPHONE (OUTPATIENT)
Dept: FAMILY MEDICINE CLINIC | Age: 22
End: 2022-11-15

## 2022-11-15 NOTE — TELEPHONE ENCOUNTER
Pt called to leave a message with Dr Solares. Pt stated that someone from [employer] would be contacting her in regards to getting an order for a test for medication. Advised pt to also send a [employer] message to Dr Solares. Pt declined stating that it was too much to send in a message and she rather someon else send the message.   Future Appointments   Date Time Provider Department Center   12/9/2022 11:30 AM Robyn Solares DO [facility]

## 2022-11-15 NOTE — TELEPHONE ENCOUNTER
Pt called to leave a message with Dr Isabel Blanco. Pt stated that someone from [employer] would be contacting her in regards to getting an order for a test for medication. Advised pt to also send a [employer] message to Dr Isabel Blanco. Pt declined stating that it was too much to send in a message and she rather someon else send the message.    Future Appointments   Date Time Provider Timur Mackey   12/9/2022 11:30 AM Robyn Solares DO [facility]

## 2022-11-17 ENCOUNTER — TELEPHONE (OUTPATIENT)
Dept: FAMILY MEDICINE CLINIC | Age: 22
End: 2022-11-17

## 2022-11-17 NOTE — TELEPHONE ENCOUNTER
The patient needs to get the details for what I need to order from [facility]. I don't have experience with this order or site, only in interpreting the results. It looks like we're supposed to contact someone named bev from [employer] but I don't see any contact info. I am willing to order the test but I need to know how to do so.

## 2022-11-17 NOTE — TELEPHONE ENCOUNTER
I have received a call through the call center patient is complaining saying that my brain feels wired , and my body feels weird she has no convulsions no loss of consciousness,  Has no dizziness ,no headaches ,  She had shortness of breath yesterday and took her nebulizer but she does not like it because she needs it makes her jittery  No nausea no vomiting  Patient is complaining about head congestion    We both agreed that at this point there is no emergency unless if she is having shortness of breath, chest pain, high fever, severe headaches, persistent nausea and vomiting, if she have any of the above symptoms she need to go to emergency room  Otherwise she need to follow-up with her PCP,

## 2022-11-17 NOTE — TELEPHONE ENCOUNTER
The patient needs to get the details for what I need to order from [facility]. I don't have experience with this order or site, only in interpreting the results. It looks like we're supposed to contact someone named bev from [facility] but I don't see any contact info. I am willing to order the test but I need to know how to do so.

## 2022-11-19 DIAGNOSIS — F41.8 ANXIETY ABOUT HEALTH: ICD-10-CM

## 2022-11-19 DIAGNOSIS — F90.9 ATTENTION DEFICIT HYPERACTIVITY DISORDER (ADHD), UNSPECIFIED ADHD TYPE: ICD-10-CM

## 2022-11-19 DIAGNOSIS — R41.83 BORDERLINE INTELLECTUAL DISABILITY: Primary | ICD-10-CM

## 2022-11-19 DIAGNOSIS — F41.0 PANIC ATTACKS: ICD-10-CM

## 2022-11-19 DIAGNOSIS — R44.0 AUDITORY HALLUCINATION: ICD-10-CM

## 2022-11-21 NOTE — TELEPHONE ENCOUNTER
Pt states that she called [facility] [employer] again and that they will be calling the office regarding the order. She also states that the only thing they’re going to be requesting is a dna swab test to see which antidepressants are genetically compatible for the pt. I will talk to the representative once they call to clarify order.

## 2022-11-21 NOTE — TELEPHONE ENCOUNTER
Pt states that she called [facility] [employer] again and that they will be calling the office regarding the order. She also states that the only thing theyre going to be requesting is a dna swab test to see which antidepressants are genetically compatible for the pt. I will talk to the representative once they call to clarify order.

## 2022-11-23 NOTE — TELEPHONE ENCOUNTER
Maile Marie from [facility] called to follow up.    Claires best contact is [phone]

## 2022-11-28 ENCOUNTER — TELEPHONE (OUTPATIENT)
Dept: FAMILY MEDICINE CLINIC | Age: 22
End: 2022-11-28

## 2022-11-28 NOTE — TELEPHONE ENCOUNTER
Pt stated that she has been waiting for the provider that prescribed her Abilify to contact her she has not heard anything from her. Pt thinks that is the medication. Pt stated she has not taken the medication in a week. Pt also called to follow- up on request for gene testing pt was informed that we are waiting on a call back from the office.

## 2022-11-28 NOTE — TELEPHONE ENCOUNTER
Pt stated that she has been waiting for the provider that prescribed her Abilify to contact her she has not heard anything from her.Pt thinks that is the medication. Pt stated she has not taken the medication in a week. Pt also called to follow- up on request for gene testing pt was informed that we are waiting on a call back from the office.

## 2022-11-29 NOTE — TELEPHONE ENCOUNTER
Spoke to pt and informed her that we are not aware who prescribed her the medication and that [facility]. Pt states that she just want to inform Dr. Solares that she had stop taking the Abilify because of how it's making her feel. Informed the pt that I will let Dr. Solares know.

## 2022-11-29 NOTE — TELEPHONE ENCOUNTER
Spoke to pt and informed her that we are not aware who prescribed her the medication and that we are still waiting for [employer] to call us back. Pt states that she just want to inform Dr. Romy Kathleen that she had stop taking the Abilify because of how it's making her feel. Informed the pt that I will let Dr. Romy Kathleen know.

## 2022-12-09 ENCOUNTER — APPOINTMENT (OUTPATIENT)
Dept: FAMILY MEDICINE CLINIC | Age: 22
End: 2022-12-09

## 2022-12-09 ENCOUNTER — TELEPHONE (OUTPATIENT)
Dept: FAMILY MEDICINE CLINIC | Age: 22
End: 2022-12-09

## 2022-12-09 ENCOUNTER — OFFICE VISIT (OUTPATIENT)
Dept: FAMILY MEDICINE CLINIC | Age: 22
End: 2022-12-09
Payer: MEDICAID

## 2022-12-09 VITALS
HEIGHT: 64 IN | WEIGHT: 217 LBS | OXYGEN SATURATION: 99 % | HEART RATE: 74 BPM | SYSTOLIC BLOOD PRESSURE: 125 MMHG | TEMPERATURE: 97 F | BODY MASS INDEX: 37.05 KG/M2 | RESPIRATION RATE: 15 BRPM | DIASTOLIC BLOOD PRESSURE: 83 MMHG

## 2022-12-09 DIAGNOSIS — Z79.01 ANTICOAGULATED: ICD-10-CM

## 2022-12-09 DIAGNOSIS — F41.8 ANXIETY ABOUT HEALTH: ICD-10-CM

## 2022-12-09 DIAGNOSIS — F90.9 ATTENTION DEFICIT HYPERACTIVITY DISORDER (ADHD), UNSPECIFIED ADHD TYPE: ICD-10-CM

## 2022-12-09 DIAGNOSIS — R82.90 ABNORMAL URINE ODOR: ICD-10-CM

## 2022-12-09 DIAGNOSIS — R41.83 BORDERLINE INTELLECTUAL DISABILITY: ICD-10-CM

## 2022-12-09 DIAGNOSIS — M32.9 SYSTEMIC LUPUS ERYTHEMATOSUS, UNSPECIFIED SLE TYPE, UNSPECIFIED ORGAN INVOLVEMENT STATUS (HCC): ICD-10-CM

## 2022-12-09 DIAGNOSIS — F41.0 PANIC ATTACKS: ICD-10-CM

## 2022-12-09 DIAGNOSIS — F20.0 PARANOID SCHIZOPHRENIA (HCC): Primary | ICD-10-CM

## 2022-12-09 DIAGNOSIS — R44.0 AUDITORY HALLUCINATION: ICD-10-CM

## 2022-12-09 RX ORDER — FLUTICASONE PROPIONATE 50 MCG
2 SPRAY, SUSPENSION (ML) NASAL DAILY
Qty: 1 EACH | Refills: 1 | Status: SHIPPED | OUTPATIENT
Start: 2022-12-09

## 2022-12-09 NOTE — PROGRESS NOTES
Kylie Merritt is a 23 y.o. female (: 2000)  Pt is not fasting. Pt is in Room # 17 presenting to address:    No chief complaint on file. There were no vitals filed for this visit. Hearing/Vision:   No results found. Learning Assessment:     Learning Assessment 2021   PRIMARY LEARNER Patient   HIGHEST LEVEL OF EDUCATION - PRIMARY LEARNER  -   BARRIERS PRIMARY LEARNER -   CO-LEARNER CAREGIVER -   PRIMARY LANGUAGE ENGLISH   LEARNER PREFERENCE PRIMARY DEMONSTRATION     READING   ANSWERED BY patient    RELATIONSHIP SELF     Depression Screening:     3 most recent PHQ Screens 2022   Little interest or pleasure in doing things Not at all   Feeling down, depressed, irritable, or hopeless Not at all   Total Score PHQ 2 0   Trouble falling or staying asleep, or sleeping too much -   Feeling tired or having little energy -   Poor appetite, weight loss, or overeating -   Feeling bad about yourself - or that you are a failure or have let yourself or your family down -   Trouble concentrating on things such as school, work, reading, or watching TV -   Moving or speaking so slowly that other people could have noticed; or the opposite being so fidgety that others notice -   Thoughts of being better off dead, or hurting yourself in some way -   PHQ 9 Score -   How difficult have these problems made it for you to do your work, take care of your home and get along with others -     Fall Risk Assessment:     Fall Risk Assessment, last 12 mths 2022   Able to walk? Yes   Fall in past 12 months? 0   Do you feel unsteady? 0   Are you worried about falling 0     Abuse Screening:     Abuse Screening Questionnaire 2022   Do you ever feel afraid of your partner? N   Are you in a relationship with someone who physically or mentally threatens you? N   Is it safe for you to go home? Y     ADL Assessment:   No flowsheet data found. Coordination of Care Questionaire:   1.  \"Have you been to the ER, urgent care clinic since your last visit? Hospitalized since your last visit? \" No    2. \"Have you seen or consulted any other health care providers outside of the [address] [place] [address] since your last visit? \" No     3. For patients aged 39-70: Has the patient had a colonoscopy / FIT/ Cologuard? NA - based on age      If the patient is female:    4. For patients aged 41-77: Has the patient had a mammogram within the past 2 years? NA - based on age or sex  See top three    5. For patients aged 21-65: Has the patient had a pap smear? No    Advanced Directive:   1. Do you have an Advanced Directive? NO    2. Would you like information on Advanced Directives?  NO

## 2022-12-09 NOTE — PROGRESS NOTES
Ladi Gil (: 2000) is a 23 y.o. female, ESTABLISHED patient, here for FOLLOW UP:    ICD-10-CM ICD-9-CM   1. Paranoid schizophrenia ([facility]  F20.0 295.30   2. Abnormal urine odor  R82.90 791.9   3. Auditory hallucination  R44.0 780.1   4. Systemic lupus erythematosus, unspecified SLE type, unspecified organ involvement status ([facility]  M32.9 710.0   5. Borderline intellectual disability  R41.83 V62.89   6. Panic attacks  F41.0 300.01   7. Anxiety about health  F41.8 300.09   8. Attention deficit hyperactivity disorder (ADHD), unspecified ADHD type  F90.9 314.01   9. Anticoagulated  Z79.01 V58.61     Assessment   Plan     #Primarily auditory hallucinations with tendency toward paranoia consistent with likely #schizophrenia - uncontrolled  Main hallucination of scary voice making comments/no commands   Feels in crisis when positive symptoms uncontrolled, Also with significant negative symptoms   #Anxiety about health   With #Panic attacks   #ADHD - Formal Neuropsychiatry dx 2022  #Borderline intellectual learning disability   Reviewed risk/benefit of both preventative daily treatment and rescue (prn) medication. CSA reviewed and signed in office 22. Random UDS 22     Prior treatments:   Felt \"zombie\" like with Zoloft (3mo of use). Xanax helps with sx but reports is too sedating even with use of 1/2 of 0.25mg tablet. CBD gummies help with sx but too sedating. Poorly tolerated Duloxetine and discontinued after short use  Poorly tolerated Prozac stating felt anxious with every dose she took, stopped before using regularly ~1mo  Tried 2 days of Atomoxetine and report notable improvement in productivity with use. Fearful that recent SOB was from medication but suspect uncontrolled asthma and encouraged to try again.    Stopped Lexapro after ~1mo, 2 weeks without; Nightmares, brainzaps have restarted since discharge   Stopped Wellbutrin after ~1mo, initially thought was helpful     Current regimen and adjustments:   Lorazepam as rescue agent ONLY (previously used as AED and believes tolerated well). Goal to use less than twice weekly on average. Reviewed to avoid prolonged regular use >6wks to prevent dependence. Effective response in symptoms with one dose. Established with Therapist   Established with Psychiatrist with recommendation to start Abilify and Buspar prn. Reviewed safety and indications for Abilify and encouraged to try. Patient tried for 3 days and noting fear of AE stopped abruptly. Extensive discussion regarding symptoms and suspect uncontrolled schizophrenia presenting as AE of treatments. Reviewed benefits of antipsychotic medications to target most disruptive symptoms and encouraged to try. Reviewed benefits of long acing injectable treatments with adherence and encouraged patient to discuss with Psych scheduled 12/13/22. Plan:   Encouraged to restart Buspar scheduled BID and try Rexulti. Counseled Patient that I have a low suspicion Abilify was contributing to recent symptoms and she could restart if recommended by Psych.       03/28/2022 03/28/2022   1  Lorazepam 0.5 Mg Tablet 30.00  30  Ki Neg  [id number]   Matthias (0556)  0  0.50 LME  [place]  [place]     12/03/2021 12/03/2021   1  Alprazolam 0.25 Mg Tablet              Last PDMP Joe as Reviewed:  Review User Review Instant Review Result   Serina Perez 11/8/2022  8:50 PM Reviewed PDMP [1]     #Body mass index is 36.39 kg/m². Counseled on diet and exercise methods. Positive reinforcement provided. Wt Readings from Last 3 Encounters:   12/09/22 217 lb (98.4 kg)   10/12/22 216 lb (98 kg)   09/09/22 213 lb 3.2 oz (96.7 kg)     #GERD with esophagitis  And #Esophageal stenosis s/p dilation  And #Atrophic gastritis  Established with GI  Rec review of EGD 4/27/22 with \"LA Grade A reflux esophagitis. Benign-appearing esophageal stenosis. Dilated. Atrophic gastritis. \" and plan to increase Omeprazole to 40mg and for gastric emptying study and repeat EGD prn for retreatment     #Hx of Seizure - reports 1 prior seizure; Not on AEDs  Reports occurrence of Seizure around time of initial Lupus diagnosis (Late 2018)  Record review EEG 10/18/18: Normal EEG recorded during the awake state. No epileptiform discharges or focal abnormality was seen. This does not preclude a diagnosis of epilepsy. When first diagnosed with Lupus (Late 2018), was having significant migraines (daily), hyperpigmented rash on arms/face, and fatigue - also notes significant memory loss regarding this time of life. Reports initial symptoms improved after starting Lupus treatment (Cellcept, Plaquenil) but has had recent recurrence of neuro sx in last few months and interested in evaluation. No longer on Cellcept. MRI brain 4/25/22 with no acute infarct, hemorrhage, mass effect, or herniation. Referred to Neurology 3/28/22 Marylee Felts) - unable to schedule prior to October 2022      #mild persistent asthma - improved  Restarted daily preventative inhaler . Reviewed instructions and optimal use to improve symptoms. Given difficulty with inhalers, encouraged to find nebulizer machine to utilize nebulizer treatments when having acute illness. Follow up if use of Albuterol inhaler increases to twice weekly or more. Established with Pulmonology; Encouraged to follow up and pursue sleep study to r/o MADDY contributing to sx (STOP Bang screen 3, high risk)     Key COPD Medications               albuterol (PROVENTIL VENTOLIN) 2.5 mg /3 mL (0.083 %) nebu (Taking) 1.5 mL by Nebulization route every four (4) hours as needed for Wheezing, Shortness of Breath or Respiratory Distress. Flovent HFA 44 mcg/actuation inhaler (Taking) Take 2 Puffs by inhalation two (2) times a day. albuterol (PROVENTIL HFA, VENTOLIN HFA, PROAIR HFA) 90 mcg/actuation inhaler (Taking) Take 2 Puffs by inhalation every four (4) hours as needed.     Advair -21 mcg/actuation inhaler           #Acute DVT of LLE in setting of #Lupus   #Anticoagulated on Xarelto  Established with Rheumatology for Lupus monitoring   Taking plaquenil; Cellcept discontinued     Lab Results   Component Value Date/Time    Sed rate (ESR) 38 (H) 2022 07:57 AM     #HLD - goal <100  Reviewed Statin hx, unclear if needed but has not been using regularly. Interested to stop using given Patient's goal to reduce pill burden and continue with monitoring and focusing on management with heart healthy habits. Lab Results   Component Value Date/Time    LDL, calculated 96 2022 07:57 AM     #Vit D Def - reviewed dx and recommended supplement dosing  Lab Results   Component Value Date/Time    VITAMIN D, 25-HYDROXY 37.4 2022 07:57 AM             Orders Placed This Encounter    CULTURE, URINE     Standing Status:   Future     Standing Expiration Date:   12/10/2023    URINALYSIS W/ RFLX MICROSCOPIC     Standing Status:   Future     Standing Expiration Date:   2023    fluticasone propionate (FLONASE) 50 mcg/actuation nasal spray     Si Sprays by Both Nostrils route daily. Dispense:  1 Each     Refill:  1    brexpiprazole (REXULTI) 0.25 mg tab tablet     Sig: Starting dose: Will start with low dose of 0.25mg once daily until follow up with Psychiatry 22     Dispense:  30 Tablet     Refill:  0     Return in about 2 weeks (around 2022) for 45 min follow up, med management, Virtual OK. Subjective   Last PCP visit: 2022  Since last visit:   Any changes in health, procedures, hospital visits, or specialist visits? Visit to ED, change of treatment with psych   Tolerating medications without adverse reactions? Reports good compliance with Rx without notable adverse effects with nonpsychiatric treatment. Continues to have difficulty with adherence to psychiatric medications.       Current Outpatient Medications   Medication Instructions    Advair -21 mcg/actuation inhaler No dose, route, or frequency recorded. albuterol (PROVENTIL HFA, VENTOLIN HFA, PROAIR HFA) 90 mcg/actuation inhaler 2 Puffs, Inhalation, EVERY 4 HOURS AS NEEDED    albuterol (PROVENTIL VENTOLIN) 1.25 mg, Nebulization, EVERY 4 HOURS AS NEEDED    brexpiprazole (REXULTI) 0.25 mg tab tablet Starting dose: Will start with low dose of 0.25mg once daily until follow up with Psychiatry 12/13/22    Flovent HFA 44 mcg/actuation inhaler 2 Puffs, Inhalation, 2 TIMES DAILY    fluticasone propionate (FLONASE) 50 mcg/actuation nasal spray 2 Sprays, Both Nostrils, DAILY    hydrOXYchloroQUINE (PLAQUENIL) 200 mg, Oral, DAILY    loratadine (CLARITIN) 10 mg tablet loratadine 10 mg tablet  Take 1 tablet every day by oral route    LORazepam (ATIVAN) 0.5 mg, Oral, DAILY AS NEEDED    omeprazole (PRILOSEC) 40 mg, Oral, DAILY    Xarelto 10 mg tablet No dose, route, or frequency recorded. Objective   Visit Vitals  /83 (BP 1 Location: Left upper arm, BP Patient Position: Sitting)   Pulse 74   Temp 97 °F (36.1 °C) (Temporal)   Resp 15   Ht 5' 4\" (1.626 m)   Wt 217 lb (98.4 kg)   LMP 11/27/2022   SpO2 99%   BMI 37.25 kg/m²     Physical Exam  Nursing note reviewed. Constitutional:       General: She is not in acute distress. Pulmonary:      Effort: Pulmonary effort is normal. No respiratory distress. Neurological:      Mental Status: She is alert and oriented to person, place, and time. Psychiatric:         Attention and Perception: Attention normal. She perceives auditory hallucinations. Mood and Affect: Mood is anxious. Speech: Speech normal.         Behavior: Behavior normal.         Thought Content: Thought content is paranoid. On this date 12/09/2022 I have spent 45 minutes reviewing previous notes, test results and face to face with the patient discussing the diagnosis and importance of compliance with the treatment plan as well as documenting on the day of the visit.   Jose Nowak DO  Family Medicine  12/09/2022

## 2022-12-11 LAB
BILIRUB UR QL: NEGATIVE
CLARITY: CLEAR
COLOR UR: YELLOW
GLUCOSE UR QL: NEGATIVE MG/DL
HGB UR QL STRIP: NEGATIVE
KETONES UR QL STRIP.AUTO: NEGATIVE MG/DL
LEUKOCYTE ESTERASE: NEGATIVE
NITRITE UR QL STRIP.AUTO: NEGATIVE
PH UR STRIP: 8 PH (ref 5–8)
PROT UR QL STRIP: NEGATIVE MG/DL
RESULT: NORMAL
SP GR UR: 1.01 (ref 1–1.03)
UROBILINOGEN UR STRIP-MCNC: 0.2 MG/DL

## 2022-12-21 ENCOUNTER — TELEPHONE (OUTPATIENT)
Dept: FAMILY MEDICINE CLINIC | Age: 22
End: 2022-12-21

## 2022-12-21 NOTE — TELEPHONE ENCOUNTER
Hospitalist called the office to see the dosage for cellcept. LPN informed that the hospitalist that she should NOT be taking this medication since it was d/c from our records.

## 2022-12-27 NOTE — THERAPY DISCHARGE
[address] [place] [address] PHYSICAL THERAPY  [address] [place] [address], [facility], [facility] - Phone: [phone]  Fax: [phone]    DISCHARGE NOTE  Patient Name: Morris Cash : 2000   Treatment/Medical Diagnosis: Pain in left thigh [M79.652]  Pain in right thigh [M79.651]   Referral Source: Tory Merchant MD     Date of Initial Visit: 8/10/22 Attended Visits: 3 Missed Visits: 1       SUMMARY OF TREATMENT  Pt attended only initial evaluation and     2     follow-ups and then did not return. Therefore a formal reassessment of goals was not performed. RECOMMENDATIONS  Discontinue physical therapy due to patient not returning. If you have any questions/comments please contact us directly at [phone]. Thank you for allowing us to assist in the care of your patient. Therapist Signature: Diana Mireles PT Date: 22     Time: 4:42 PM     NOTE TO PHYSICIAN:  Your patient's insurance requires this discharge note be signed and returned. PLEASE COMPLETE THE ORDERS BELOW AND RETURN TO:  [facility]YS[facility]    ___ I have read the above report and request that my patient be discharged from therapy.      Physician Signature:        Date:       Time:                                        Tory Merchant MD

## 2023-01-04 ENCOUNTER — VIRTUAL VISIT (OUTPATIENT)
Dept: FAMILY MEDICINE CLINIC | Age: 23
End: 2023-01-04
Payer: MEDICAID

## 2023-01-04 DIAGNOSIS — M32.9 SYSTEMIC LUPUS ERYTHEMATOSUS, UNSPECIFIED SLE TYPE, UNSPECIFIED ORGAN INVOLVEMENT STATUS (HCC): ICD-10-CM

## 2023-01-04 DIAGNOSIS — F41.0 PANIC ATTACKS: ICD-10-CM

## 2023-01-04 DIAGNOSIS — Z79.01 ANTICOAGULATED: ICD-10-CM

## 2023-01-04 DIAGNOSIS — F20.0 PARANOID SCHIZOPHRENIA (HCC): ICD-10-CM

## 2023-01-04 DIAGNOSIS — R45.851 SUICIDAL IDEATIONS: Primary | ICD-10-CM

## 2023-01-04 DIAGNOSIS — R41.83 BORDERLINE INTELLECTUAL DISABILITY: ICD-10-CM

## 2023-01-04 DIAGNOSIS — R44.0 AUDITORY HALLUCINATION: ICD-10-CM

## 2023-01-04 DIAGNOSIS — F90.9 ATTENTION DEFICIT HYPERACTIVITY DISORDER (ADHD), UNSPECIFIED ADHD TYPE: ICD-10-CM

## 2023-01-04 DIAGNOSIS — F41.8 ANXIETY ABOUT HEALTH: ICD-10-CM

## 2023-01-04 RX ORDER — ONDANSETRON 4 MG/1
TABLET, ORALLY DISINTEGRATING ORAL
COMMUNITY
Start: 2022-11-04

## 2023-01-04 RX ORDER — PANTOPRAZOLE SODIUM 40 MG/1
TABLET, DELAYED RELEASE ORAL
COMMUNITY
Start: 2022-12-13

## 2023-01-04 RX ORDER — PREDNISONE 5 MG/1
5 TABLET ORAL 4 TIMES DAILY
COMMUNITY
Start: 2022-12-23

## 2023-01-04 RX ORDER — MYCOPHENOLATE MOFETIL 500 MG/1
500 TABLET ORAL 4 TIMES DAILY
COMMUNITY
Start: 2022-12-17

## 2023-01-04 NOTE — PROGRESS NOTES
For virtual visit patient would like to receive link via TEXT/EMAIL: [phone]. Noy Luo is a 23 y.o. female (: 2000) evaluated via telephone on 2023 to address:    Chief Complaint   Patient presents with    Form Completion       No flowsheet data found. Allergies Reviewed. Learning Assessment:     Learning Assessment 2021   PRIMARY LEARNER Patient   HIGHEST LEVEL OF EDUCATION - PRIMARY LEARNER  -   BARRIERS PRIMARY LEARNER -   CO-LEARNER CAREGIVER -   PRIMARY LANGUAGE ENGLISH   LEARNER PREFERENCE PRIMARY DEMONSTRATION     READING   ANSWERED BY patient    RELATIONSHIP SELF     Depression Screening:     3 most recent PHQ Screens 2023   Little interest or pleasure in doing things Nearly every day   Feeling down, depressed, irritable, or hopeless Nearly every day   Total Score PHQ 2 6   Trouble falling or staying asleep, or sleeping too much Nearly every day   Feeling tired or having little energy Nearly every day   Poor appetite, weight loss, or overeating Nearly every day   Feeling bad about yourself - or that you are a failure or have let yourself or your family down Not at all   Trouble concentrating on things such as school, work, reading, or watching TV Nearly every day   Moving or speaking so slowly that other people could have noticed; or the opposite being so fidgety that others notice More than half the days   Thoughts of being better off dead, or hurting yourself in some way Several days   PHQ 9 Score 21   How difficult have these problems made it for you to do your work, take care of your home and get along with others Extremely difficult     Fall Risk Assessment:     Fall Risk Assessment, last 12 mths 2022   Able to walk? Yes   Fall in past 12 months? 0   Do you feel unsteady? 0   Are you worried about falling 0     Abuse Screening:     Abuse Screening Questionnaire 2022   Do you ever feel afraid of your partner?  N   Are you in a relationship with someone who physically or mentally threatens you? N   Is it safe for you to go home? Y     ADL Assessment:   No flowsheet data found. Coordination of Care Questionaire:   1. Have you been to the ER, urgent care clinic since your last visit? Hospitalized since your last visit? YES, Briseyda Cancino x yesterday for stomach flu.    2. Have you seen or consulted any other health care providers outside of the [address] [place] [address] since your last visit? Include any pap smears or colon screening. NO    Advanced Directive:   1. Do you have an Advanced Directive? NO    2. Would you like information on Advanced Directives?  NO

## 2023-01-05 ENCOUNTER — TELEPHONE (OUTPATIENT)
Dept: FAMILY MEDICINE CLINIC | Age: 23
End: 2023-01-05

## 2023-01-05 NOTE — TELEPHONE ENCOUNTER
Pt was transferred by nurse triage. No nurse was available. Pt called with abdominal pain for 2 days and diarrhea last night continously and can not eat. I spoke with PCP and was advised to schedule pt between the 2 visits that she has scheduled already. Pt did not want to wait she wanted Dr Andrea Garcia to send a prescription for diarrhea. However I did inform the pt that she would have to be seen by PCP in order to have a prescription sent to the pharmacy. Again pt was not satisfied and stated that she will take OTC medication.

## 2023-01-05 NOTE — TELEPHONE ENCOUNTER
Pt was transferred by nurse triage. No nurse was available. Pt called with abdominal pain for 2 days and diarrhea last night continously and can not eat. I spoke with PCP and was advised to schedule pt between the 2 visits that she has scheduled already. Pt did not want to wait she wanted Dr Solares to send a prescription for diarrhea. However I did inform the pt that she would have to be seen by PCP in order to have a prescription sent to the pharmacy. Again pt was not satisfied and stated that she will take OTC medication.

## 2023-01-06 NOTE — TELEPHONE ENCOUNTER
Call from patient regarding abdominal cramping, took pepto bismol-states that she has already called an ambulance and does not need any assistance

## 2023-01-11 NOTE — PROGRESS NOTES
Ladi Gil (: 2000) is a 23 y.o. female, ESTABLISHED patient, here for a VIRTUAL VISIT to address:    ICD-10-CM ICD-9-CM   1. Suicidal ideations  R45.851 V62.84   2. Auditory hallucination  R44.0 780.1   3. Paranoid schizophrenia ([facility] [address] [facility]  F20.0 295.30   4. Systemic lupus erythematosus, unspecified SLE type, unspecified organ involvement status ([facility]  M32.9 710.0   5. Borderline intellectual disability  R41.83 V62.89   6. Panic attacks  F41.0 300.01   7. Anxiety about health  F41.8 300.09   8. Attention deficit hyperactivity disorder (ADHD), unspecified ADHD type  F90.9 314.01   9. Anticoagulated  Z79.01 V58.61     Assessment   Plan     #Suicidal ideations - Reports thoughts of taking unused pills, denies prior attempts; Reviewed need to discard all unnecessary prescriptions and that if misses appointment I will have to notify nonemergency police line to do welfare check. States parents (lives with) are protective factors. Denies concerns for safety . Aware if fear for safety would need to go to hospital with behavioral health unit. Provided with National suicide prevention lifeline and encouraged to enter contact into cell phone, available . Encouraged to utilize virtual visit same or next day visits with myself for acute mental health concerns. #Primarily auditory hallucinations with tendency toward paranoia consistent with likely #schizophrenia - uncontrolled  Main hallucination of \"scary voice\" making comments/no commands   Feels in crisis when positive symptoms uncontrolled, Also with significant negative symptoms   #Anxiety about health   With #Panic attacks   #ADHD - Formal Neuropsychiatry dx 2022  #Borderline intellectual learning disability   Reviewed risk/benefit of both preventative daily treatment and rescue (prn) medication. CSA reviewed and signed in office 22. Random UDS 22     Prior treatments:   Felt \"zombie\" like with Zoloft (3mo of use).    Xanax helps with sx but reports is too sedating even with use of 1/2 of 0.25mg tablet. CBD gummies help with sx but too sedating. Poorly tolerated Duloxetine and discontinued after short use  Poorly tolerated Prozac stating felt anxious with every dose she took, stopped before using regularly ~1mo  Tried 2 days of Atomoxetine and report notable improvement in productivity with use. Fearful that recent SOB was from medication but suspect uncontrolled asthma and encouraged to try again. Stopped Lexapro after ~1mo, 2 weeks without; Nightmares, brainzaps have restarted since discharge   Stopped Wellbutrin after ~1mo, initially thought was helpful      Current regimen and adjustments:   Lorazepam as rescue agent ONLY (previously used as AED and believes tolerated well). Goal to use less than twice weekly on average. Reviewed to avoid prolonged regular use >6wks to prevent dependence. Effective response in symptoms with one dose. Established with Therapist   Established with Psychiatrist with recommendation to manage medications, encouraged to continue with treatment plan as prescribed. Reviewed indications, safety profile of treatments. 03/28/2022 03/28/2022    1  Lorazepam 0.5 Mg Tablet 30.00  30  Ki Neg  [id number]   Matthias (0556)  0  0.50 LME  [place]     12/03/2021 12/03/2021    1  Alprazolam 0.25 Mg Tablet                        Last PDMP Joe as Reviewed:  Review User Review Instant Review Result   [place] Dose 1/29/2023  8:12 AM Reviewed PDMP [1]      #Body mass index is 36.39 kg/m². Counseled on diet and exercise methods. Positive reinforcement provided. Wt Readings from Last 3 Encounters:   12/09/22 217 lb (98.4 kg)   10/12/22 216 lb (98 kg)   09/09/22 213 lb 3.2 oz (96.7 kg)      #GERD with esophagitis  And #Esophageal stenosis s/p dilation  And #Atrophic gastritis  Established with GI  Rec review of EGD 4/27/22 with \"LA Grade A reflux esophagitis. Benign-appearing esophageal stenosis. Dilated.  Atrophic gastritis. \" and plan to increase Omeprazole to 40mg and for gastric emptying study and repeat EGD prn for retreatment     #Hx of Seizure - reports 1 prior seizure; Not on AEDs  Reports occurrence of Seizure around time of initial Lupus diagnosis (Late 2018)  Record review EEG 10/18/18: Normal EEG recorded during the awake state. No epileptiform discharges or focal abnormality was seen. This does not preclude a diagnosis of epilepsy. When first diagnosed with Lupus (Late 2018), was having significant migraines (daily), hyperpigmented rash on arms/face, and fatigue - also notes significant memory loss regarding this time of life. Reports initial symptoms improved after starting Lupus treatment (Cellcept, Plaquenil) but has had recent recurrence of neuro sx in last few months and interested in evaluation. No longer on Cellcept. MRI brain 4/25/22 with no acute infarct, hemorrhage, mass effect, or herniation. Referred to Neurology 3/28/22 Rosemary Dodge) - unable to schedule prior to October 2022      #mild persistent asthma - improved  Restarted daily preventative inhaler . Reviewed instructions and optimal use to improve symptoms. Given difficulty with inhalers, encouraged to find nebulizer machine to utilize nebulizer treatments when having acute illness. Follow up if use of Albuterol inhaler increases to twice weekly or more. Established with Pulmonology; Encouraged to follow up and pursue sleep study to r/o MADDY contributing to sx (STOP Bang screen 3, high risk)     Key COPD Medications                    albuterol (PROVENTIL VENTOLIN) 2.5 mg /3 mL (0.083 %) nebu (Taking) 1.5 mL by Nebulization route every four (4) hours as needed for Wheezing, Shortness of Breath or Respiratory Distress. Flovent HFA 44 mcg/actuation inhaler (Taking) Take 2 Puffs by inhalation two (2) times a day.      albuterol (PROVENTIL HFA, VENTOLIN HFA, PROAIR HFA) 90 mcg/actuation inhaler (Taking) Take 2 Puffs by inhalation every four (4) hours as needed. Advair -21 mcg/actuation inhaler               #Acute DVT of LLE in setting of #Lupus   #Anticoagulated on Xarelto  Established with Rheumatology for Lupus monitoring   Taking plaquenil; Cellcept discontinued           Lab Results   Component Value Date/Time     Sed rate (ESR) 38 (H) 08/29/2022 07:57 AM      #HLD - goal <100  Reviewed Statin hx, unclear if needed but has not been using regularly. Interested to stop using given Patient's goal to reduce pill burden and continue with monitoring and focusing on management with heart healthy habits. Lab Results   Component Value Date/Time     LDL, calculated 96 08/29/2022 07:57 AM      #Vit D Def - reviewed dx and recommended supplement dosing        Lab Results   Component Value Date/Time     VITAMIN D, 25-HYDROXY 37.4 08/29/2022 07:57 AM           No orders of the defined types were placed in this encounter. Return for visit as already scheduled, Virtual OK. Subjective   Last PCP visit: 1/4/2023  See A/P     Current Outpatient Medications   Medication Instructions    Advair -21 mcg/actuation inhaler No dose, route, or frequency recorded. albuterol (PROVENTIL HFA, VENTOLIN HFA, PROAIR HFA) 90 mcg/actuation inhaler 2 Puffs, Inhalation, EVERY 4 HOURS AS NEEDED    albuterol (PROVENTIL VENTOLIN) 1.25 mg, Nebulization, EVERY 4 HOURS AS NEEDED    brexpiprazole (REXULTI) 0.25 mg tab tablet Starting dose:  Will start with low dose of 0.25mg once daily until follow up with Psychiatry 12/13/22    Flovent HFA 44 mcg/actuation inhaler 2 Puffs, 2 TIMES DAILY    fluticasone propionate (FLONASE) 50 mcg/actuation nasal spray 2 Sprays, Both Nostrils, DAILY    hydrOXYchloroQUINE (PLAQUENIL) 200 mg, Oral, DAILY    loratadine (CLARITIN) 10 mg tablet loratadine 10 mg tablet  Take 1 tablet every day by oral route    LORazepam (ATIVAN) 1 mg, Oral, DAILY AS NEEDED    mycophenolate (CELLCEPT) 500 mg, Oral, 4 TIMES DAILY    omeprazole (PRILOSEC) 40 mg, Oral, DAILY    ondansetron (ZOFRAN ODT) 4 mg disintegrating tablet No dose, route, or frequency recorded. pantoprazole (PROTONIX) 40 mg tablet No dose, route, or frequency recorded. predniSONE (DELTASONE) 5 mg, Oral, 4 TIMES DAILY    Xarelto 10 mg, Oral, DAILY WITH DINNER      Allergies   Allergen Reactions    Cephalexin Other (comments)     Patient c/o severe chest pain      Inapsine [Droperidol] Other (comments)     Confusion, light headedness, neck dystonia, responded to benadryl     Metronidazole Other (comments)    Pcn [Penicillins] Hives and Itching      Objective   There were no vitals taken for this visit. Physical Exam  Nursing note reviewed. Constitutional:       General: She is not in acute distress. Pulmonary:      Effort: Pulmonary effort is normal. No respiratory distress. Neurological:      Mental Status: She is alert and oriented to person, place, and time. Psychiatric:         Attention and Perception: Attention normal. She perceives auditory hallucinations. Mood and Affect: Mood is anxious. Affect is tearful. Speech: Speech normal.         Behavior: Behavior normal.         Thought Content: Thought content is paranoid. On this date 01/12/2023 I have spent 35 minutes reviewing previous notes, test results and face to face with the patient discussing the diagnosis and importance of compliance with the treatment plan as well as documenting on the day of the visit. Kathleen Tam, who was evaluated through a synchronous (real-time) audio-video encounter, and/or her healthcare decision maker, is aware that it is a billable service, which includes applicable co-pays, with coverage as determined by her insurance carrier. She provided verbal consent to proceed and patient identification was verified.  This visit was conducted pursuant to the emergency declaration under the [address] waiver authority and the Coronavirus Preparedness and Response Supplemental Appropriations Act. A caregiver was present when appropriate. Ability to conduct physical exam was limited. The patient was located at home in a state where the provider was licensed to provide care.    Bryon Rea DO  Family Medicine  01/12/2023

## 2023-01-12 ENCOUNTER — VIRTUAL VISIT (OUTPATIENT)
Dept: FAMILY MEDICINE CLINIC | Age: 23
End: 2023-01-12
Payer: MEDICAID

## 2023-01-12 DIAGNOSIS — F41.8 ANXIETY ABOUT HEALTH: ICD-10-CM

## 2023-01-12 DIAGNOSIS — F41.0 PANIC ATTACKS: ICD-10-CM

## 2023-01-12 DIAGNOSIS — F20.0 PARANOID SCHIZOPHRENIA (HCC): ICD-10-CM

## 2023-01-12 DIAGNOSIS — R44.0 AUDITORY HALLUCINATION: ICD-10-CM

## 2023-01-12 DIAGNOSIS — R41.83 BORDERLINE INTELLECTUAL DISABILITY: ICD-10-CM

## 2023-01-12 DIAGNOSIS — M32.9 SYSTEMIC LUPUS ERYTHEMATOSUS, UNSPECIFIED SLE TYPE, UNSPECIFIED ORGAN INVOLVEMENT STATUS (HCC): ICD-10-CM

## 2023-01-12 DIAGNOSIS — Z79.01 ANTICOAGULATED: ICD-10-CM

## 2023-01-12 DIAGNOSIS — F90.9 ATTENTION DEFICIT HYPERACTIVITY DISORDER (ADHD), UNSPECIFIED ADHD TYPE: ICD-10-CM

## 2023-01-12 DIAGNOSIS — R45.851 SUICIDAL IDEATIONS: Primary | ICD-10-CM

## 2023-01-12 PROCEDURE — 99214 OFFICE O/P EST MOD 30 MIN: CPT | Performed by: STUDENT IN AN ORGANIZED HEALTH CARE EDUCATION/TRAINING PROGRAM

## 2023-01-12 NOTE — PATIENT INSTRUCTIONS
Suicide & Crisis Lifeline: The [address] [place], [place] [address]way is a [facility] suicide prevention network of over 160 crisis centers that provides 24/7 service via a toll-free hotline with the number 9-8-8. It is available to anyone in suicidal crisis or emotional distress. 988lifeline. org

## 2023-01-17 ENCOUNTER — VIRTUAL VISIT (OUTPATIENT)
Dept: FAMILY MEDICINE CLINIC | Age: 23
End: 2023-01-17
Payer: MEDICAID

## 2023-01-17 DIAGNOSIS — R44.0 AUDITORY HALLUCINATION: ICD-10-CM

## 2023-01-17 DIAGNOSIS — F41.8 ANXIETY ABOUT HEALTH: ICD-10-CM

## 2023-01-17 DIAGNOSIS — M32.19 SYSTEMIC LUPUS ERYTHEMATOSUS WITH OTHER ORGAN INVOLVEMENT, UNSPECIFIED SLE TYPE (HCC): ICD-10-CM

## 2023-01-17 DIAGNOSIS — R45.851 SUICIDAL IDEATIONS: Primary | ICD-10-CM

## 2023-01-17 DIAGNOSIS — Z79.01 ANTICOAGULATED: ICD-10-CM

## 2023-01-17 DIAGNOSIS — F90.9 ATTENTION DEFICIT HYPERACTIVITY DISORDER (ADHD), UNSPECIFIED ADHD TYPE: ICD-10-CM

## 2023-01-17 DIAGNOSIS — R41.83 BORDERLINE INTELLECTUAL DISABILITY: ICD-10-CM

## 2023-01-17 DIAGNOSIS — F41.0 PANIC ATTACKS: ICD-10-CM

## 2023-01-17 DIAGNOSIS — F20.0 PARANOID SCHIZOPHRENIA (HCC): ICD-10-CM

## 2023-01-17 RX ORDER — LORAZEPAM 1 MG/1
1 TABLET ORAL
Qty: 30 TABLET | Refills: 0 | Status: SHIPPED | OUTPATIENT
Start: 2023-01-17

## 2023-01-17 NOTE — PROGRESS NOTES
Ladi Gil (: 2000) is a 23 y.o. female, ***ESTABLISHED patient, here for a VIRTUAL VISIT to address:  No diagnosis found. Assessment   Plan   ******    Living with Parent     #Primarily auditory hallucinations with tendency toward paranoia consistent with likely #schizophrenia - uncontrolled  Main hallucination of scary voice making comments/no commands   Feels in crisis when positive symptoms uncontrolled, Also with significant negative symptoms   #Anxiety about health   With #Panic attacks   #ADHD - Formal Neuropsychiatry dx 2022  #Borderline intellectual learning disability   Reviewed risk/benefit of both preventative daily treatment and rescue (prn) medication. CSA reviewed and signed in office 22. Random UDS 22     Prior treatments:   Felt \"zombie\" like with Zoloft (3mo of use). Xanax helps with sx but reports is too sedating even with use of 1/2 of 0.25mg tablet. CBD gummies help with sx but too sedating. Poorly tolerated Duloxetine and discontinued after short use  Poorly tolerated Prozac stating felt anxious with every dose she took, stopped before using regularly ~1mo  Tried 2 days of Atomoxetine and report notable improvement in productivity with use. Fearful that recent SOB was from medication but suspect uncontrolled asthma and encouraged to try again. Stopped Lexapro after ~1mo, 2 weeks without; Nightmares, brainzaps have restarted since discharge   Stopped Wellbutrin after ~1mo, initially thought was helpful     Current regimen and adjustments:   Lorazepam as rescue agent ONLY (previously used as AED and believes tolerated well). Goal to use less than twice weekly on average. Reviewed to avoid prolonged regular use >6wks to prevent dependence. Effective response in symptoms with one dose. Established with Therapist   Established with Psychiatrist with recommendation to start Abilify and Buspar prn.  Reviewed safety and indications for Abilify and encouraged to try. Patient tried for 3 days and noting fear of AE stopped abruptly. Extensive discussion regarding symptoms and suspect uncontrolled schizophrenia presenting as AE of treatments. Reviewed benefits of antipsychotic medications to target most disruptive symptoms and encouraged to try. Reviewed benefits of long acing injectable treatments with adherence and encouraged patient to discuss with Psych scheduled 12/13/22. Plan:   Encouraged to restart Buspar scheduled BID and try Rexulti. Counseled Patient that I have a low suspicion Abilify was contributing to recent symptoms and she could restart if recommended by Psych.       03/28/2022 03/28/2022   1  Lorazepam 0.5 Mg Tablet 30.00  30  Ki Neg  [id number]   Matthias (0556)  0  0.50 LME  [place]     12/03/2021 12/03/2021   1  Alprazolam 0.25 Mg Tablet              Last PDMP Joe as Reviewed:  Review User Review Instant Review Result   Kathryn Degroot 1/10/2023  7:54 PM Reviewed PDMP [1]     #Body mass index is 36.39 kg/m². Counseled on diet and exercise methods. Positive reinforcement provided. Wt Readings from Last 3 Encounters:   12/09/22 217 lb (98.4 kg)   10/12/22 216 lb (98 kg)   09/09/22 213 lb 3.2 oz (96.7 kg)     #GERD with esophagitis  And #Esophageal stenosis s/p dilation  And #Atrophic gastritis  Established with GI  Rec review of EGD 4/27/22 with \"LA Grade A reflux esophagitis. Benign-appearing esophageal stenosis. Dilated. Atrophic gastritis. \" and plan to increase Omeprazole to 40mg and for gastric emptying study and repeat EGD prn for retreatment     #Hx of Seizure - reports 1 prior seizure; Not on AEDs  Reports occurrence of Seizure around time of initial Lupus diagnosis (Late 2018)  Record review EEG 10/18/18: Normal EEG recorded during the awake state. No epileptiform discharges or focal abnormality was seen. This does not preclude a diagnosis of epilepsy.     When first diagnosed with Lupus (Late 2018), was having significant migraines (daily), hyperpigmented rash on arms/face, and fatigue - also notes significant memory loss regarding this time of life. Reports initial symptoms improved after starting Lupus treatment (Cellcept, Plaquenil) but has had recent recurrence of neuro sx in last few months and interested in evaluation. No longer on Cellcept. MRI brain 4/25/22 with no acute infarct, hemorrhage, mass effect, or herniation. Referred to Neurology 3/28/22 Dong Rosales - unable to schedule prior to October 2022      #mild persistent asthma - improved  Restarted daily preventative inhaler . Reviewed instructions and optimal use to improve symptoms. Given difficulty with inhalers, encouraged to find nebulizer machine to utilize nebulizer treatments when having acute illness. Follow up if use of Albuterol inhaler increases to twice weekly or more. Established with Pulmonology; Encouraged to follow up and pursue sleep study to r/o MADDY contributing to sx (STOP Bang screen 3, high risk)     Key COPD Medications               albuterol (PROVENTIL VENTOLIN) 2.5 mg /3 mL (0.083 %) nebu (Taking) 1.5 mL by Nebulization route every four (4) hours as needed for Wheezing, Shortness of Breath or Respiratory Distress. Flovent HFA 44 mcg/actuation inhaler (Taking) Take 2 Puffs by inhalation two (2) times a day. albuterol (PROVENTIL HFA, VENTOLIN HFA, PROAIR HFA) 90 mcg/actuation inhaler (Taking) Take 2 Puffs by inhalation every four (4) hours as needed. Advair -21 mcg/actuation inhaler           #Acute DVT of LLE in setting of #Lupus   #Anticoagulated on Xarelto  Established with Rheumatology for Lupus monitoring   Taking plaquenil; Cellcept discontinued     Lab Results   Component Value Date/Time    Sed rate (ESR) 38 (H) 08/29/2022 07:57 AM     #HLD - goal <100  Reviewed Statin hx, unclear if needed but has not been using regularly.  Interested to stop using given Patient's goal to reduce pill burden and continue with monitoring and focusing on management with heart healthy habits. Lab Results   Component Value Date/Time    LDL, calculated 96 08/29/2022 07:57 AM     #Vit D Def - reviewed dx and recommended supplement dosing    Lab Results   Component Value Date/Time    VITAMIN D, 25-HYDROXY 37.4 08/29/2022 07:57 AM                            No orders of the defined types were placed in this encounter. No follow-ups on file. Subjective   Last PCP visit: 1/12/2023  See A/P     Current Outpatient Medications   Medication Instructions    Advair -21 mcg/actuation inhaler No dose, route, or frequency recorded.  albuterol (PROVENTIL HFA, VENTOLIN HFA, PROAIR HFA) 90 mcg/actuation inhaler 2 Puffs, Inhalation, EVERY 4 HOURS AS NEEDED    albuterol (PROVENTIL VENTOLIN) 1.25 mg, Nebulization, EVERY 4 HOURS AS NEEDED    brexpiprazole (REXULTI) 0.25 mg tab tablet Starting dose: Will start with low dose of 0.25mg once daily until follow up with Psychiatry 12/13/22    Flovent HFA 44 mcg/actuation inhaler 2 Puffs, 2 TIMES DAILY    fluticasone propionate (FLONASE) 50 mcg/actuation nasal spray 2 Sprays, Both Nostrils, DAILY    hydrOXYchloroQUINE (PLAQUENIL) 200 mg, Oral, DAILY    loratadine (CLARITIN) 10 mg tablet loratadine 10 mg tablet  Take 1 tablet every day by oral route    LORazepam (ATIVAN) 0.5 mg, Oral, DAILY AS NEEDED    mycophenolate (CELLCEPT) 500 mg, Oral, 4 TIMES DAILY    omeprazole (PRILOSEC) 40 mg, Oral, DAILY    ondansetron (ZOFRAN ODT) 4 mg disintegrating tablet No dose, route, or frequency recorded.  pantoprazole (PROTONIX) 40 mg tablet No dose, route, or frequency recorded.  predniSONE (DELTASONE) 5 mg, Oral, 4 TIMES DAILY    Xarelto 10 mg tablet No dose, route, or frequency recorded.       Allergies   Allergen Reactions    Cephalexin Other (comments)     Patient c/o severe chest pain      Inapsine [Droperidol] Other (comments)     Confusion, light headedness, neck dystonia, responded to benadryl     Metronidazole Other (comments)    Pcn [Penicillins] Hives and Itching      Objective   There were no vitals taken for this visit. Physical Exam     {Time Documentation Optional:[id number]}  Ladi Troy, who was evaluated through a synchronous (real-time) audio-video encounter, and/or her healthcare decision maker, is aware that it is a billable service, which includes applicable co-pays, with coverage as determined by her insurance carrier. She provided verbal consent to proceed and patient identification was verified. This visit was conducted pursuant to the emergency declaration under the [address] [place] [address], [address] [place] [address] authority and the [employer] and [employer] General Act. A caregiver was present when appropriate. Ability to conduct physical exam was limited. The patient was located at home in a state where the provider was licensed to provide care.    Gonsalo Womack,   Family Medicine  01/17/2023

## 2023-01-20 ENCOUNTER — VIRTUAL VISIT (OUTPATIENT)
Dept: FAMILY MEDICINE CLINIC | Age: 23
End: 2023-01-20

## 2023-01-24 ENCOUNTER — VIRTUAL VISIT (OUTPATIENT)
Dept: FAMILY MEDICINE CLINIC | Age: 23
End: 2023-01-24
Payer: MEDICAID

## 2023-01-24 DIAGNOSIS — F41.0 PANIC ATTACKS: ICD-10-CM

## 2023-01-24 DIAGNOSIS — F41.8 ANXIETY ABOUT HEALTH: Primary | ICD-10-CM

## 2023-01-24 DIAGNOSIS — R41.83 BORDERLINE INTELLECTUAL DISABILITY: ICD-10-CM

## 2023-01-24 DIAGNOSIS — Z79.01 ANTICOAGULATED: ICD-10-CM

## 2023-01-24 DIAGNOSIS — R45.851 SUICIDAL IDEATIONS: ICD-10-CM

## 2023-01-24 DIAGNOSIS — F20.0 PARANOID SCHIZOPHRENIA (HCC): ICD-10-CM

## 2023-01-24 DIAGNOSIS — R44.0 AUDITORY HALLUCINATION: ICD-10-CM

## 2023-01-24 DIAGNOSIS — M32.19 SYSTEMIC LUPUS ERYTHEMATOSUS WITH OTHER ORGAN INVOLVEMENT, UNSPECIFIED SLE TYPE (HCC): ICD-10-CM

## 2023-01-24 NOTE — PROGRESS NOTES
Ladi Gil (: 2000) is a 23 y.o. female, ESTABLISHED patient, here for FOLLOW UP:    ICD-10-CM ICD-9-CM   1. Anxiety about health  F41.8 300.09   2. Panic attacks  F41.0 300.01   3. Paranoid schizophrenia ([facility]  F20.0 295.30   4. Systemic lupus erythematosus with other organ involvement, unspecified SLE type ([facility]  M32.19 710.0   5. Borderline intellectual disability  R41.83 V62.89   6. Auditory hallucination  R44.0 780.1   7. Suicidal ideations  R45.851 V62.84   8. Anticoagulated  Z79.01 V58.61     Assessment   Plan   #Suicidal ideations - Reports thoughts of taking unused pills, denies prior attempts; Reviewed need to discard all unnecessary prescriptions and that if misses appointment I will have to notify nonemergency police line to do welfare check. States parents (lives with) are protective factors. Denies concerns for safety . Aware if fear for safety would need to go to hospital with behavioral health unit. Provided with National suicide prevention lifeline and encouraged to enter contact into cell phone, available . Encouraged to utilize virtual visit same or next day visits with myself for acute mental health concerns. #Primarily auditory hallucinations with tendency toward paranoia consistent with likely #schizophrenia - uncontrolled  Main hallucination of \"scary voice\" making comments/no commands   Feels in crisis when positive symptoms uncontrolled, Also with significant negative symptoms   #Anxiety about health   With #Panic attacks   #ADHD - Formal Neuropsychiatry dx 2022  #Borderline intellectual learning disability   Reviewed risk/benefit of both preventative daily treatment and rescue (prn) medication. CSA reviewed and signed in office 22. Random UDS 22     Prior treatments:   Felt \"zombie\" like with Zoloft (3mo of use). Xanax helps with sx but reports is too sedating even with use of 1/2 of 0.25mg tablet.    CBD gummies help with sx but too sedating. Poorly tolerated Duloxetine and discontinued after short use  Poorly tolerated Prozac stating felt anxious with every dose she took, stopped before using regularly ~1mo  Tried 2 days of Atomoxetine and report notable improvement in productivity with use. Fearful that recent SOB was from medication but suspect uncontrolled asthma and encouraged to try again. Stopped Lexapro after ~1mo, 2 weeks without; Nightmares, brainzaps have restarted since discharge   Stopped Wellbutrin after ~1mo, initially thought was helpful      Current regimen and adjustments:   Lorazepam as rescue agent ONLY (previously used as AED and believes tolerated well). Goal to use less than twice weekly on average. Reviewed to avoid prolonged regular use >6wks to prevent dependence. Effective response in symptoms with one dose. Established with Therapist   Established with Psychiatrist with recommendation to manage medications, encouraged to continue with treatment plan as prescribed. Reviewed indications, safety profile of treatments. 03/28/2022 03/28/2022    1  Lorazepam 0.5 Mg Tablet 30.00  30  Ki Neg  [id number]   Matthias (0556)  0  0.50 LME  [place]     12/03/2021 12/03/2021    1  Alprazolam 0.25 Mg Tablet                        Last PDMP Joe as Reviewed:  Review User Review Instant Review Result   Derrick Galvan 1/29/2023  8:12 AM Reviewed PDMP [1]      #Acute DVT of LLE in setting of #Lupus with CNS manifestations  #Anticoagulated on Xarelto  #Hx of ? Seizure - reports 1 prior seizure; Not on AEDs - Reports occurrence of Seizure around time of initial Lupus diagnosis (Late 2018)  Established with Rheumatology for Lupus monitoring   Taking plaquenil; Cellcept discontinued               Lab Results   Component Value Date/Time     Sed rate (ESR) 38 (H) 08/29/2022 07:57 AM      Record review EEG 10/18/18: Normal EEG recorded during the awake state. No epileptiform discharges or focal abnormality was seen.  This does not preclude a diagnosis of epilepsy. When first diagnosed with Lupus (Late 2018), was having significant migraines (daily), hyperpigmented rash on arms/face, and fatigue - also notes significant memory loss regarding this time of life. Reports initial symptoms improved after starting Lupus treatment (Cellcept, Plaquenil) but has had recent recurrence of neuro sx in last few months and interested in evaluation. No longer on Cellcept. MRI brain 4/25/22 with no acute infarct, hemorrhage, mass effect, or herniation. [facility] - Rec review last visit Oct 2022   \"SLE with CNS manifestations: We are reluctant to attempt to redirect the therapeutic strategy, but consideration of non-steroid therapies may be appropriate given the apparently limited benefit noted thus far from steroids in terms of neuropsychiatric manifestations. Cellcept has been initiated but cannot be expected to reach therapeutic efficacy for several more months. Plaquenil also initiated but again without clear demonstration that the inflammatory process is fully stabilized. --Continue with current regimen for now, pending further discussion with primary team/rheumatology. Our preference would be to trial patient off of corticosteroids (or at least to reduce daily dose to 40 mg or below, given tendency of neuropsychiatric effects to be dose-responsive) to evaluate if neuropsychiatric manifestations change (potentially for the better or for the worse), but this should not be done without approval from rheumatology\"     #Body mass index is 36.39 kg/m². Counseled on diet and exercise methods. Positive reinforcement provided.          Wt Readings from Last 3 Encounters:   12/09/22 217 lb (98.4 kg)   10/12/22 216 lb (98 kg)   09/09/22 213 lb 3.2 oz (96.7 kg)      #GERD with esophagitis  And #Esophageal stenosis s/p dilation  And #Atrophic gastritis  Established with GI  Rec review of EGD 4/27/22 with \"LA Grade A reflux esophagitis. Benign-appearing esophageal stenosis. Dilated. Atrophic gastritis. \" and plan to increase Omeprazole to 40mg and for gastric emptying study and repeat EGD prn for retreatment     #mild persistent asthma - improved  Restarted daily preventative inhaler . Reviewed instructions and optimal use to improve symptoms. Given difficulty with inhalers, encouraged to find nebulizer machine to utilize nebulizer treatments when having acute illness. Follow up if use of Albuterol inhaler increases to twice weekly or more. Established with Pulmonology; Encouraged to follow up and pursue sleep study to r/o MADDY contributing to sx (STOP Bang screen 3, high risk)     Key COPD Medications                    albuterol (PROVENTIL VENTOLIN) 2.5 mg /3 mL (0.083 %) nebu (Taking) 1.5 mL by Nebulization route every four (4) hours as needed for Wheezing, Shortness of Breath or Respiratory Distress. Flovent HFA 44 mcg/actuation inhaler (Taking) Take 2 Puffs by inhalation two (2) times a day. albuterol (PROVENTIL HFA, VENTOLIN HFA, PROAIR HFA) 90 mcg/actuation inhaler (Taking) Take 2 Puffs by inhalation every four (4) hours as needed. Advair -21 mcg/actuation inhaler               #HLD - goal <100  Reviewed Statin hx, unclear if needed but has not been using regularly. Interested to stop using given Patient's goal to reduce pill burden and continue with monitoring and focusing on management with heart healthy habits. Lab Results   Component Value Date/Time     LDL, calculated 96 08/29/2022 07:57 AM      #Vit D Def - reviewed dx and recommended supplement dosing            Lab Results   Component Value Date/Time     VITAMIN D, 25-HYDROXY 37.4 08/29/2022 07:57 AM                No orders of the defined types were placed in this encounter. Return for visit as already scheduled.   Subjective   Last PCP visit: 1/20/2023  See A/P     Current Outpatient Medications   Medication Instructions    Advair -21 mcg/actuation inhaler No dose, route, or frequency recorded. albuterol (PROVENTIL HFA, VENTOLIN HFA, PROAIR HFA) 90 mcg/actuation inhaler 2 Puffs, Inhalation, EVERY 4 HOURS AS NEEDED    albuterol (PROVENTIL VENTOLIN) 1.25 mg, Nebulization, EVERY 4 HOURS AS NEEDED    brexpiprazole (REXULTI) 0.25 mg tab tablet Starting dose: Will start with low dose of 0.25mg once daily until follow up with Psychiatry 12/13/22    Flovent HFA 44 mcg/actuation inhaler 2 Puffs, 2 TIMES DAILY    fluticasone propionate (FLONASE) 50 mcg/actuation nasal spray 2 Sprays, Both Nostrils, DAILY    hydrOXYchloroQUINE (PLAQUENIL) 200 mg, Oral, DAILY    loratadine (CLARITIN) 10 mg tablet loratadine 10 mg tablet  Take 1 tablet every day by oral route    LORazepam (ATIVAN) 1 mg, Oral, DAILY AS NEEDED    mycophenolate (CELLCEPT) 500 mg, Oral, 4 TIMES DAILY    ondansetron (ZOFRAN ODT) 4 mg disintegrating tablet No dose, route, or frequency recorded. pantoprazole (PROTONIX) 40 mg tablet No dose, route, or frequency recorded. predniSONE (DELTASONE) 5 mg, Oral, 4 TIMES DAILY    Xarelto 10 mg, Oral, DAILY WITH DINNER      Objective   There were no vitals taken for this visit. Physical Exam  Nursing note reviewed. Constitutional:       General: She is not in acute distress. Pulmonary:      Effort: Pulmonary effort is normal. No respiratory distress. Neurological:      Mental Status: She is alert and oriented to person, place, and time. Psychiatric:         Attention and Perception: Attention normal. She perceives auditory hallucinations. Mood and Affect: Mood is anxious. Speech: Speech normal.         Behavior: Behavior normal.         Thought Content: Thought content is paranoid.           Kristyn Real,   Family Medicine  01/24/2023

## 2023-01-26 NOTE — PROGRESS NOTES
Ladi Gil (: 2000) is a 23 y.o. female, ESTABLISHED patient, here for a VIRTUAL VISIT to address:    ICD-10-CM ICD-9-CM   1. Suicidal ideations  R45.851 V62.84   2. Anticoagulated  Z79.01 V58.61   3. Systemic lupus erythematosus, unspecified SLE type, unspecified organ involvement status ([facility]  M32.9 710.0   4. Anxiety about health  F41.8 300.09   5. Paranoid schizophrenia ([facility]  F20.0 295.30   6. Panic attacks  F41.0 300.01   7. Systemic lupus erythematosus with other organ involvement, unspecified SLE type ([facility]  M32.19 710.0   8. Borderline intellectual disability  R41.83 V62.89   9. Auditory hallucination  R44.0 780.1     Assessment   Plan   #Suicidal ideations - Reports thoughts of taking unused pills, denies prior attempts; Reviewed need to discard all unnecessary prescriptions and that if misses appointment I will have to notify nonemergency police line to do welfare check. States parents (lives with) are protective factors. Denies concerns for safety . Aware if fear for safety would need to go to hospital with behavioral health unit. Provided with National suicide prevention lifeline and encouraged to enter contact into cell phone, available . Encouraged to utilize virtual visit same or next day visits with myself for acute mental health concerns. #Primarily auditory hallucinations with tendency toward paranoia consistent with likely #schizophrenia - uncontrolled  Main hallucination of \"scary voice\" making comments/no commands   Feels in crisis when positive symptoms uncontrolled, Also with significant negative symptoms   #Anxiety about health   With #Panic attacks   #ADHD - Formal Neuropsychiatry dx 2022  #Borderline intellectual learning disability   Reviewed risk/benefit of both preventative daily treatment and rescue (prn) medication. CSA reviewed and signed in office 22. Random UDS 22     Prior treatments:   Felt \"zombie\" like with Zoloft (3mo of use). Xanax helps with sx but reports is too sedating even with use of 1/2 of 0.25mg tablet. CBD gummies help with sx but too sedating. Poorly tolerated Duloxetine and discontinued after short use  Poorly tolerated Prozac stating felt anxious with every dose she took, stopped before using regularly ~1mo  Tried 2 days of Atomoxetine and report notable improvement in productivity with use. Fearful that recent SOB was from medication but suspect uncontrolled asthma and encouraged to try again. Stopped Lexapro after ~1mo, 2 weeks without; Nightmares, brainzaps have restarted since discharge   Stopped Wellbutrin after ~1mo, initially thought was helpful      Current regimen and adjustments:   Lorazepam as rescue agent ONLY (previously used as AED and believes tolerated well). Goal to use less than twice weekly on average. Reviewed to avoid prolonged regular use >6wks to prevent dependence. Effective response in symptoms with one dose. Established with Therapist   Established with Psychiatrist with recommendation to manage medications, encouraged to continue with treatment plan as prescribed. Reviewed indications, safety profile of treatments. 03/28/2022 03/28/2022    1  Lorazepam 0.5 Mg Tablet 30.00  30  Ki Neg  [id number]   Matthias (0556)  0  0.50 LME  [place]     12/03/2021 12/03/2021    1  Alprazolam 0.25 Mg Tablet                        Last PDMP Joe as Reviewed:  Review User Review Instant Review Result   Audi Pena 1/29/2023  8:12 AM Reviewed PDMP [1]      #Acute DVT of LLE in setting of #Lupus with CNS manifestations  #Anticoagulated on Xarelto  #Hx of ? Seizure - reports 1 prior seizure;  Not on AEDs - Reports occurrence of Seizure around time of initial Lupus diagnosis (Late 2018)  Established with Rheumatology for Lupus monitoring   Taking plaquenil; Cellcept discontinued               Lab Results   Component Value Date/Time     Sed rate (ESR) 38 (H) 08/29/2022 07:57 AM      Record review EEG 10/18/18: Normal EEG recorded during the awake state. No epileptiform discharges or focal abnormality was seen. This does not preclude a diagnosis of epilepsy. When first diagnosed with Lupus (Late 2018), was having significant migraines (daily), hyperpigmented rash on arms/face, and fatigue - also notes significant memory loss regarding this time of life. Reports initial symptoms improved after starting Lupus treatment (Cellcept, Plaquenil) but has had recent recurrence of neuro sx in last few months and interested in evaluation. No longer on Cellcept. MRI brain 4/25/22 with no acute infarct, hemorrhage, mass effect, or herniation. Established with Neurology Col[facility] - Rec review last visit Oct 2022   \"SLE with CNS manifestations: We are reluctant to attempt to redirect the therapeutic strategy, but consideration of non-steroid therapies may be appropriate given the apparently limited benefit noted thus far from steroids in terms of neuropsychiatric manifestations. Cellcept has been initiated but cannot be expected to reach therapeutic efficacy for several more months. Plaquenil also initiated but again without clear demonstration that the inflammatory process is fully stabilized. --Continue with current regimen for now, pending further discussion with primary team/rheumatology. Our preference would be to trial patient off of corticosteroids (or at least to reduce daily dose to 40 mg or below, given tendency of neuropsychiatric effects to be dose-responsive) to evaluate if neuropsychiatric manifestations change (potentially for the better or for the worse), but this should not be done without approval from rheumatology\"     #Body mass index is 36.39 kg/m². Counseled on diet and exercise methods. Positive reinforcement provided.          Wt Readings from Last 3 Encounters:   12/09/22 217 lb (98.4 kg)   10/12/22 216 lb (98 kg)   09/09/22 213 lb 3.2 oz (96.7 kg)      #GERD with esophagitis  And #Esophageal stenosis s/p dilation  And #Atrophic gastritis  Established with GI  Rec review of EGD 4/27/22 with \"LA Grade A reflux esophagitis. Benign-appearing esophageal stenosis. Dilated. Atrophic gastritis. \" and plan to increase Omeprazole to 40mg and for gastric emptying study and repeat EGD prn for retreatment     #mild persistent asthma - improved  Restarted daily preventative inhaler . Reviewed instructions and optimal use to improve symptoms. Given difficulty with inhalers, encouraged to find nebulizer machine to utilize nebulizer treatments when having acute illness. Follow up if use of Albuterol inhaler increases to twice weekly or more. Established with Pulmonology; Encouraged to follow up and pursue sleep study to r/o MADDY contributing to sx (STOP Bang screen 3, high risk)     Key COPD Medications                    albuterol (PROVENTIL VENTOLIN) 2.5 mg /3 mL (0.083 %) nebu (Taking) 1.5 mL by Nebulization route every four (4) hours as needed for Wheezing, Shortness of Breath or Respiratory Distress. Flovent HFA 44 mcg/actuation inhaler (Taking) Take 2 Puffs by inhalation two (2) times a day. albuterol (PROVENTIL HFA, VENTOLIN HFA, PROAIR HFA) 90 mcg/actuation inhaler (Taking) Take 2 Puffs by inhalation every four (4) hours as needed. Advair -21 mcg/actuation inhaler               #HLD - goal <100  Reviewed Statin hx, unclear if needed but has not been using regularly. Interested to stop using given Patient's goal to reduce pill burden and continue with monitoring and focusing on management with heart healthy habits. Lab Results   Component Value Date/Time     LDL, calculated 96 08/29/2022 07:57 AM      #Vit D Def - reviewed dx and recommended supplement dosing            Lab Results   Component Value Date/Time     VITAMIN D, 25-HYDROXY 37.4 08/29/2022 07:57 AM            Orders Placed This Encounter    Xarelto 10 mg tablet     Sig: Take 1 Tablet by mouth daily (with dinner). Indications: prevention of venous thromboembolism recurrence     Dispense:  90 Tablet     Refill:  4     Return for visit as already scheduled. Subjective   Last PCP visit: 1/24/2023  See A/P     Current Outpatient Medications   Medication Instructions    Advair -21 mcg/actuation inhaler No dose, route, or frequency recorded. albuterol (PROVENTIL HFA, VENTOLIN HFA, PROAIR HFA) 90 mcg/actuation inhaler 2 Puffs, Inhalation, EVERY 4 HOURS AS NEEDED    albuterol (PROVENTIL VENTOLIN) 1.25 mg, Nebulization, EVERY 4 HOURS AS NEEDED    brexpiprazole (REXULTI) 0.25 mg tab tablet Starting dose: Will start with low dose of 0.25mg once daily until follow up with Psychiatry 12/13/22    Flovent HFA 44 mcg/actuation inhaler 2 Puffs, 2 TIMES DAILY    fluticasone propionate (FLONASE) 50 mcg/actuation nasal spray 2 Sprays, Both Nostrils, DAILY    hydrOXYchloroQUINE (PLAQUENIL) 200 mg, Oral, DAILY    loratadine (CLARITIN) 10 mg tablet loratadine 10 mg tablet  Take 1 tablet every day by oral route    LORazepam (ATIVAN) 1 mg, Oral, DAILY AS NEEDED    mycophenolate (CELLCEPT) 500 mg, Oral, 4 TIMES DAILY    ondansetron (ZOFRAN ODT) 4 mg disintegrating tablet No dose, route, or frequency recorded. pantoprazole (PROTONIX) 40 mg tablet No dose, route, or frequency recorded. predniSONE (DELTASONE) 5 mg, Oral, 4 TIMES DAILY    Xarelto 10 mg, Oral, DAILY WITH DINNER      Allergies   Allergen Reactions    Cephalexin Other (comments)     Patient c/o severe chest pain      Inapsine [Droperidol] Other (comments)     Confusion, light headedness, neck dystonia, responded to benadryl     Metronidazole Other (comments)    Pcn [Penicillins] Hives and Itching      Objective   There were no vitals taken for this visit. Physical Exam  Nursing note reviewed. Constitutional:       General: She is not in acute distress. Pulmonary:      Effort: Pulmonary effort is normal. No respiratory distress.    Neurological:      Mental Status: She is alert and oriented to person, place, and time. Psychiatric:         Attention and Perception: Attention normal. She perceives auditory hallucinations. Mood and Affect: Mood is anxious. Speech: Speech normal.         Behavior: Behavior normal.         Thought Content: Thought content is paranoid. Kenzie Carcamo, who was evaluated through a synchronous (real-time) audio-video encounter, and/or her healthcare decision maker, is aware that it is a billable service, which includes applicable co-pays, with coverage as determined by her insurance carrier. She provided verbal consent to proceed and patient identification was verified. This visit was conducted pursuant to the emergency declaration under the [facility], [address] [place] [address] authority and the [employer]. A caregiver was present when appropriate. Ability to conduct physical exam was limited. The patient was located at home in a state where the provider was licensed to provide care.    Umu Matson,   Family Medicine  01/27/2023

## 2023-01-27 ENCOUNTER — VIRTUAL VISIT (OUTPATIENT)
Dept: FAMILY MEDICINE CLINIC | Age: 23
End: 2023-01-27
Payer: MEDICAID

## 2023-01-27 DIAGNOSIS — F41.0 PANIC ATTACKS: ICD-10-CM

## 2023-01-27 DIAGNOSIS — R44.0 AUDITORY HALLUCINATION: ICD-10-CM

## 2023-01-27 DIAGNOSIS — M32.9 SYSTEMIC LUPUS ERYTHEMATOSUS, UNSPECIFIED SLE TYPE, UNSPECIFIED ORGAN INVOLVEMENT STATUS (HCC): ICD-10-CM

## 2023-01-27 DIAGNOSIS — F20.0 PARANOID SCHIZOPHRENIA (HCC): ICD-10-CM

## 2023-01-27 DIAGNOSIS — F41.8 ANXIETY ABOUT HEALTH: ICD-10-CM

## 2023-01-27 DIAGNOSIS — M32.19 SYSTEMIC LUPUS ERYTHEMATOSUS WITH OTHER ORGAN INVOLVEMENT, UNSPECIFIED SLE TYPE (HCC): ICD-10-CM

## 2023-01-27 DIAGNOSIS — Z79.01 ANTICOAGULATED: ICD-10-CM

## 2023-01-27 DIAGNOSIS — R45.851 SUICIDAL IDEATIONS: Primary | ICD-10-CM

## 2023-01-27 DIAGNOSIS — R41.83 BORDERLINE INTELLECTUAL DISABILITY: ICD-10-CM

## 2023-01-27 PROCEDURE — 99214 OFFICE O/P EST MOD 30 MIN: CPT | Performed by: STUDENT IN AN ORGANIZED HEALTH CARE EDUCATION/TRAINING PROGRAM

## 2023-01-27 RX ORDER — RIVAROXABAN 10 MG/1
10 TABLET, FILM COATED ORAL
Qty: 90 TABLET | Refills: 4 | Status: SHIPPED | OUTPATIENT
Start: 2023-01-27

## 2023-02-01 ENCOUNTER — TELEPHONE (OUTPATIENT)
Dept: FAMILY MEDICINE CLINIC | Age: 23
End: 2023-02-01

## 2023-02-01 NOTE — TELEPHONE ENCOUNTER
Pt is calling in regards to her [facility] paperwork. Pt stated that HR stated they have not received the paperwork.  Please advise

## 2023-02-03 NOTE — PROGRESS NOTES
Ladi Gil (: 2000) is a 23 y.o. female, ESTABLISHED patient, here for a VIRTUAL VISIT to address:    ICD-10-CM ICD-9-CM   1. Suicidal ideations  R45.851 V62.84   2. Anxiety about health  F41.8 300.09   3. Panic attacks  F41.0 300.01   4. Auditory hallucination  R44.0 780.1   5. Paranoid schizophrenia ([facility].)  F20.0 295.30   6. Systemic lupus erythematosus with other organ involvement, unspecified SLE type ([facility]  M32.19 710.0   7. Borderline intellectual disability  R41.83 V62.89   8. Attention deficit hyperactivity disorder (ADHD), unspecified ADHD type  F90.9 314.01   9. Anticoagulated  Z79.01 V58.61     Assessment   Plan     #Suicidal ideations - Reports thoughts of taking unused pills, denies prior attempts; Reviewed need to discard all unnecessary prescriptions and that if misses appointment I will have to notify nonemergency police line to do welfare check. States parents (lives with) are protective factors. Denies concerns for safety . Aware if fear for safety would need to go to hospital with behavioral health unit. Provided with National suicide prevention lifeline and encouraged to enter contact into cell phone, available . Encouraged to utilize virtual visit same or next day visits with myself for acute mental health concerns. #Primarily auditory hallucinations with tendency toward paranoia consistent with likely #schizophrenia - uncontrolled  Main hallucination of \"scary voice\" making comments/no commands   Feels in crisis when positive symptoms uncontrolled, Also with significant negative symptoms   #Anxiety about health   With #Panic attacks   #ADHD - Formal Neuropsychiatry dx 2022  #Borderline intellectual learning disability   Reviewed risk/benefit of both preventative daily treatment and rescue (prn) medication. CSA reviewed and signed in office 22. Random UDS 22     Prior treatments:   Felt \"zombie\" like with Zoloft (3mo of use).    Xanax helps with sx but reports is too sedating even with use of 1/2 of 0.25mg tablet. CBD gummies help with sx but too sedating. Poorly tolerated Duloxetine and discontinued after short use  Poorly tolerated Prozac stating felt anxious with every dose she took, stopped before using regularly ~1mo  Tried 2 days of Atomoxetine and report notable improvement in productivity with use. Fearful that recent SOB was from medication but suspect uncontrolled asthma and encouraged to try again. Stopped Lexapro after ~1mo, 2 weeks without; Nightmares, brainzaps have restarted since discharge   Stopped Wellbutrin after ~1mo, initially thought was helpful      Current regimen and adjustments:   Lorazepam as rescue agent ONLY (previously used as AED and believes tolerated well). Goal to use less than twice weekly on average. Reviewed to avoid prolonged regular use >6wks to prevent dependence. Effective response in symptoms with one dose. Established with Therapist   Established with Psychiatrist with recommendation to manage medications, encouraged to continue with treatment plan as prescribed. Reviewed indications, safety profile of treatments. 03/28/2022 03/28/2022    1  Lorazepam 0.5 Mg Tablet 30.00  30  Ki Neg  [id number]   Matthias (0556)  0  0.50 LME  [place]  [place]     12/03/2021 12/03/2021    1  Alprazolam 0.25 Mg Tablet                        Last PDMP Joe as Reviewed:  Review User Review Instant Review Result   Morgan Bland 1/29/2023  8:12 AM Reviewed PDMP [1]      #Acute DVT of LLE in setting of #Lupus with CNS manifestations  #Anticoagulated on Xarelto  #Hx of ? Seizure - reports 1 prior seizure;  Not on AEDs - Reports occurrence of Seizure around time of initial Lupus diagnosis (Late 2018)  Established with Rheumatology for Lupus monitoring   Taking plaquenil; Cellcept discontinued           Lab Results   Component Value Date/Time     Sed rate (ESR) 38 (H) 08/29/2022 07:57 AM      Record review EEG 10/18/18: Normal EEG recorded during the awake state. No epileptiform discharges or focal abnormality was seen. This does not preclude a diagnosis of epilepsy. When first diagnosed with Lupus (Late 2018), was having significant migraines (daily), hyperpigmented rash on arms/face, and fatigue - also notes significant memory loss regarding this time of life. Reports initial symptoms improved after starting Lupus treatment (Cellcept, Plaquenil) but has had recent recurrence of neuro sx in last few months and interested in evaluation. No longer on Cellcept. MRI brain 4/25/22 with no acute infarct, hemorrhage, mass effect, or herniation. Established with Neurology Marlene Rogers) - Rec review last visit Oct 2022   \"SLE with CNS manifestations: We are reluctant to attempt to redirect the therapeutic strategy, but consideration of non-steroid therapies may be appropriate given the apparently limited benefit noted thus far from steroids in terms of neuropsychiatric manifestations. Cellcept has been initiated but cannot be expected to reach therapeutic efficacy for several more months. Plaquenil also initiated but again without clear demonstration that the inflammatory process is fully stabilized. --Continue with current regimen for now, pending further discussion with primary team/rheumatology. Our preference would be to trial patient off of corticosteroids (or at least to reduce daily dose to 40 mg or below, given tendency of neuropsychiatric effects to be dose-responsive) to evaluate if neuropsychiatric manifestations change (potentially for the better or for the worse), but this should not be done without approval from rheumatology\"    #Body mass index is 36.39 kg/m². Counseled on diet and exercise methods. Positive reinforcement provided.        Wt Readings from Last 3 Encounters:   12/09/22 217 lb (98.4 kg)   10/12/22 216 lb (98 kg)   09/09/22 213 lb 3.2 oz (96.7 kg)      #GERD with esophagitis  And #Esophageal stenosis s/p dilation  And #Atrophic gastritis  Established with GI  Rec review of EGD 4/27/22 with \"LA Grade A reflux esophagitis. Benign-appearing esophageal stenosis. Dilated. Atrophic gastritis. \" and plan to increase Omeprazole to 40mg and for gastric emptying study and repeat EGD prn for retreatment     #mild persistent asthma - improved  Restarted daily preventative inhaler . Reviewed instructions and optimal use to improve symptoms. Given difficulty with inhalers, encouraged to find nebulizer machine to utilize nebulizer treatments when having acute illness. Follow up if use of Albuterol inhaler increases to twice weekly or more. Established with Pulmonology; Encouraged to follow up and pursue sleep study to r/o MADDY contributing to sx (STOP Bang screen 3, high risk)     Key COPD Medications                    albuterol (PROVENTIL VENTOLIN) 2.5 mg /3 mL (0.083 %) nebu (Taking) 1.5 mL by Nebulization route every four (4) hours as needed for Wheezing, Shortness of Breath or Respiratory Distress. Flovent HFA 44 mcg/actuation inhaler (Taking) Take 2 Puffs by inhalation two (2) times a day. albuterol (PROVENTIL HFA, VENTOLIN HFA, PROAIR HFA) 90 mcg/actuation inhaler (Taking) Take 2 Puffs by inhalation every four (4) hours as needed. Advair -21 mcg/actuation inhaler              #HLD - goal <100  Reviewed Statin hx, unclear if needed but has not been using regularly. Interested to stop using given Patient's goal to reduce pill burden and continue with monitoring and focusing on management with heart healthy habits. Lab Results   Component Value Date/Time     LDL, calculated 96 08/29/2022 07:57 AM      #Vit D Def - reviewed dx and recommended supplement dosing        Lab Results   Component Value Date/Time     VITAMIN D, 25-HYDROXY 37.4 08/29/2022 07:57 AM           Orders Placed This Encounter    LORazepam (ATIVAN) 1 mg tablet     Sig: Take 1 Tablet by mouth daily as needed for Anxiety.      Dispense:  30 Tablet Refill:  0     Return for visit as already scheduled. Subjective   Last PCP visit: 1/12/2023  See A/P     Current Outpatient Medications   Medication Instructions    Advair -21 mcg/actuation inhaler No dose, route, or frequency recorded. albuterol (PROVENTIL HFA, VENTOLIN HFA, PROAIR HFA) 90 mcg/actuation inhaler 2 Puffs, Inhalation, EVERY 4 HOURS AS NEEDED    albuterol (PROVENTIL VENTOLIN) 1.25 mg, Nebulization, EVERY 4 HOURS AS NEEDED    brexpiprazole (REXULTI) 0.25 mg tab tablet Starting dose: Will start with low dose of 0.25mg once daily until follow up with Psychiatry 12/13/22    Flovent HFA 44 mcg/actuation inhaler 2 Puffs, 2 TIMES DAILY    fluticasone propionate (FLONASE) 50 mcg/actuation nasal spray 2 Sprays, Both Nostrils, DAILY    hydrOXYchloroQUINE (PLAQUENIL) 200 mg, Oral, DAILY    loratadine (CLARITIN) 10 mg tablet loratadine 10 mg tablet  Take 1 tablet every day by oral route    LORazepam (ATIVAN) 1 mg, Oral, DAILY AS NEEDED    mycophenolate (CELLCEPT) 500 mg, Oral, 4 TIMES DAILY    ondansetron (ZOFRAN ODT) 4 mg disintegrating tablet No dose, route, or frequency recorded. pantoprazole (PROTONIX) 40 mg tablet No dose, route, or frequency recorded. predniSONE (DELTASONE) 5 mg, Oral, 4 TIMES DAILY    Xarelto 10 mg, Oral, DAILY WITH DINNER      Allergies   Allergen Reactions    Cephalexin Other (comments)     Patient c/o severe chest pain      Inapsine [Droperidol] Other (comments)     Confusion, light headedness, neck dystonia, responded to benadryl     Metronidazole Other (comments)    Pcn [Penicillins] Hives and Itching      Objective   There were no vitals taken for this visit. Physical Exam  Nursing note reviewed. Constitutional:       General: She is not in acute distress. Pulmonary:      Effort: Pulmonary effort is normal. No respiratory distress. Neurological:      Mental Status: She is alert and oriented to person, place, and time.    Psychiatric:         Attention and Perception: Attention normal. She perceives auditory hallucinations. Mood and Affect: Mood is anxious. Affect is tearful. Speech: Speech normal.         Behavior: Behavior normal.         Thought Content: Thought content is paranoid. Sarabjit Sanchez, who was evaluated through a synchronous (real-time) audio-video encounter, and/or her healthcare decision maker, is aware that it is a billable service, which includes applicable co-pays, with coverage as determined by her insurance carrier. She provided verbal consent to proceed and patient identification was verified. This visit was conducted pursuant to the emergency declaration under the [address] [place] [address], [address] [place] [address] authority and the [employer] Act. A caregiver was present when appropriate. Ability to conduct physical exam was limited. The patient was located at home in a state where the provider was licensed to provide care.    Preeti Potter DO  Family Medicine  01/17/2023

## 2023-04-04 ENCOUNTER — TELEPHONE (OUTPATIENT)
Dept: FAMILY MEDICINE CLINIC | Facility: CLINIC | Age: 23
End: 2023-04-04

## 2023-04-04 DIAGNOSIS — Z11.1 TUBERCULOSIS SCREENING: Primary | ICD-10-CM

## 2023-04-04 NOTE — TELEPHONE ENCOUNTER
Lab was ordered, pt was informed of order and will pickup to [facility] due to pt's insurance.

## 2023-04-04 NOTE — TELEPHONE ENCOUNTER
Pt called stating that she has interns that is requiring a TB. Pt is requesting the Quantiferon. Pt needs to have the results by 4/18/23 or she will fail the class.  Please advise

## 2023-04-05 ENCOUNTER — TELEPHONE (OUTPATIENT)
Dept: FAMILY MEDICINE CLINIC | Facility: CLINIC | Age: 23
End: 2023-04-05

## 2023-04-05 NOTE — TELEPHONE ENCOUNTER
----- Message from Jose Garcia sent at 4/5/2023 11:32 AM EDT -----  Subject: Message to Provider    QUESTIONS  Information for Provider? Patient has something that she is suppose to    today at the  but she does not get off until 4 she was   wondering if there was a way she could print it off or someone else could    for her   ---------------------------------------------------------------------------  --------------  [facility]  [phone]; OK to leave message on voicemail  ---------------------------------------------------------------------------  --------------  SCRIPT ANSWERS  Relationship to Patient?  Self

## 2023-07-28 ENCOUNTER — OFFICE VISIT (OUTPATIENT)
Age: 23
End: 2023-07-28

## 2023-07-28 VITALS
HEART RATE: 78 BPM | RESPIRATION RATE: 20 BRPM | TEMPERATURE: 97.1 F | BODY MASS INDEX: 35.68 KG/M2 | SYSTOLIC BLOOD PRESSURE: 111 MMHG | WEIGHT: 209 LBS | OXYGEN SATURATION: 98 % | HEIGHT: 64 IN | DIASTOLIC BLOOD PRESSURE: 67 MMHG

## 2023-07-28 DIAGNOSIS — Z00.00 WELL ADULT EXAM: Primary | ICD-10-CM

## 2023-07-28 DIAGNOSIS — E66.09 CLASS 2 OBESITY DUE TO EXCESS CALORIES WITH BODY MASS INDEX (BMI) OF 35.0 TO 35.9 IN ADULT, UNSPECIFIED WHETHER SERIOUS COMORBIDITY PRESENT: ICD-10-CM

## 2023-07-28 PROBLEM — M32.8 OTHER FORMS OF SYSTEMIC LUPUS ERYTHEMATOSUS (HCC): Status: ACTIVE | Noted: 2018-09-18

## 2023-07-28 PROBLEM — E66.812 CLASS 2 OBESITY DUE TO EXCESS CALORIES WITH BODY MASS INDEX (BMI) OF 35.0 TO 35.9 IN ADULT: Status: ACTIVE | Noted: 2023-07-28

## 2023-07-28 PROBLEM — J45.909 ASTHMA: Status: ACTIVE | Noted: 2017-04-28

## 2023-07-28 PROBLEM — K21.9 GASTROESOPHAGEAL REFLUX DISEASE: Status: ACTIVE | Noted: 2023-07-28

## 2023-07-28 PROBLEM — R21 MALAR RASH: Status: ACTIVE | Noted: 2018-09-18

## 2023-07-28 PROBLEM — I82.5Z2 CHRONIC DEEP VEIN THROMBOSIS (DVT) OF DISTAL VEIN OF LEFT LOWER EXTREMITY (HCC): Status: ACTIVE | Noted: 2018-09-21

## 2023-07-28 RX ORDER — LAMOTRIGINE 200 MG/1
200 TABLET ORAL DAILY
COMMUNITY

## 2023-07-28 RX ORDER — PANTOPRAZOLE SODIUM 40 MG/1
40 TABLET, DELAYED RELEASE ORAL DAILY
COMMUNITY

## 2023-07-28 SDOH — ECONOMIC STABILITY: FOOD INSECURITY: WITHIN THE PAST 12 MONTHS, YOU WORRIED THAT YOUR FOOD WOULD RUN OUT BEFORE YOU GOT MONEY TO BUY MORE.: NEVER TRUE

## 2023-07-28 SDOH — ECONOMIC STABILITY: INCOME INSECURITY: HOW HARD IS IT FOR YOU TO PAY FOR THE VERY BASICS LIKE FOOD, HOUSING, MEDICAL CARE, AND HEATING?: NOT HARD AT ALL

## 2023-07-28 SDOH — ECONOMIC STABILITY: HOUSING INSECURITY
IN THE LAST 12 MONTHS, WAS THERE A TIME WHEN YOU DID NOT HAVE A STEADY PLACE TO SLEEP OR SLEPT IN A SHELTER (INCLUDING NOW)?: NO

## 2023-07-28 SDOH — ECONOMIC STABILITY: FOOD INSECURITY: WITHIN THE PAST 12 MONTHS, THE FOOD YOU BOUGHT JUST DIDN'T LAST AND YOU DIDN'T HAVE MONEY TO GET MORE.: NEVER TRUE

## 2023-07-28 ASSESSMENT — PATIENT HEALTH QUESTIONNAIRE - PHQ9
SUM OF ALL RESPONSES TO PHQ QUESTIONS 1-9: 0
SUM OF ALL RESPONSES TO PHQ9 QUESTIONS 1 & 2: 0
SUM OF ALL RESPONSES TO PHQ QUESTIONS 1-9: 0
SUM OF ALL RESPONSES TO PHQ QUESTIONS 1-9: 0
1. LITTLE INTEREST OR PLEASURE IN DOING THINGS: 0
SUM OF ALL RESPONSES TO PHQ QUESTIONS 1-9: 0
SUM OF ALL RESPONSES TO PHQ9 QUESTIONS 1 & 2: 0
SUM OF ALL RESPONSES TO PHQ QUESTIONS 1-9: 0
2. FEELING DOWN, DEPRESSED OR HOPELESS: 0
2. FEELING DOWN, DEPRESSED OR HOPELESS: 0
SUM OF ALL RESPONSES TO PHQ QUESTIONS 1-9: 0
1. LITTLE INTEREST OR PLEASURE IN DOING THINGS: 0

## 2023-07-28 ASSESSMENT — ENCOUNTER SYMPTOMS
EYE DISCHARGE: 0
BACK PAIN: 0
PHOTOPHOBIA: 0
TROUBLE SWALLOWING: 0
CONSTIPATION: 0
WHEEZING: 0
SHORTNESS OF BREATH: 0
SORE THROAT: 0
NAUSEA: 0
ABDOMINAL PAIN: 0
VOMITING: 0
COUGH: 0
VOICE CHANGE: 0
RHINORRHEA: 0
DIARRHEA: 0
EYE ITCHING: 0

## 2023-07-28 ASSESSMENT — ANXIETY QUESTIONNAIRES
2. NOT BEING ABLE TO STOP OR CONTROL WORRYING: 0
3. WORRYING TOO MUCH ABOUT DIFFERENT THINGS: 0
1. FEELING NERVOUS, ANXIOUS, OR ON EDGE: 0
7. FEELING AFRAID AS IF SOMETHING AWFUL MIGHT HAPPEN: 0
6. BECOMING EASILY ANNOYED OR IRRITABLE: 0
5. BEING SO RESTLESS THAT IT IS HARD TO SIT STILL: 0
4. TROUBLE RELAXING: 0
GAD7 TOTAL SCORE: 0

## 2023-07-28 NOTE — PROGRESS NOTES
Chief Complaint   Patient presents with    Establish Care    Rash         1. \"Have you been to the ER, urgent care clinic since your last visit? Hospitalized since your last visit? \" No    2. \"Have you seen or consulted any other health care providers outside of the [address] [place] [address] since your last visit? \" No     3. For patients aged 43-73: Has the patient had a colonoscopy / FIT/ Cologuard?  NA - based on age      Okay to discontinue medications no longer taking per verbal order Dr. Bogdan Mart

## 2023-07-28 NOTE — PROGRESS NOTES
History and Physical      Christie Mac  YOB: 2000    Date of Service:  7/28/2023    Chief Complaint:   Margi Mckenzie is a 20 y.o. female who presents for complete physical examination. HPI: Pleasant 64-year-old female with a past medical history of SLE, obesity BMI 35.87 presents today to establish care and for CPE. Patient at this time denies any acute medical issues. Patient states she has a history of anxiety disorder and depression and currently follows with a psychiatrist ([facility]) via telemedicine every month. Patient states she is unsure about the effectiveness of the medications (with regards to control of her anxiety and depression). Patient states she is otherwise in her usual state of health.     Wt Readings from Last 3 Encounters:   07/28/23 209 lb (94.8 kg)   12/09/22 217 lb (98.4 kg)   10/12/22 216 lb (98 kg)     BP Readings from Last 3 Encounters:   07/28/23 111/67   12/09/22 125/83   10/12/22 95/68       Patient Active Problem List   Diagnosis    Malar rash    Asthma    Chronic deep vein thrombosis (DVT) of distal vein of left lower extremity (HCC)    Gastroesophageal reflux disease    Other forms of systemic lupus erythematosus ([address])       Preventive Care:  Health Maintenance   Topic Date Due    COVID-19 Vaccine (1) Never done    Pneumococcal 0-64 years Vaccine (1 - PCV) Never done    HPV vaccine (3 - 3-dose series) 08/28/2017    Pap smear  Never done    Flu vaccine (1) 08/01/2023    Depression Monitoring  07/28/2024    DTaP/Tdap/Td vaccine (7 - Tdap) 06/24/2031    Hepatitis A vaccine  Completed    Hib vaccine  Completed    Varicella vaccine  Completed    Meningococcal (ACWY) vaccine  Completed    Hepatitis C screen  Completed    HIV screen  Completed    Hepatitis B vaccine  Discontinued      Hx abnormal PAP:   Sexual activity: has sex with males   Self-breast exams: yes  Previous DEXA scan: N/A  Last eye exam: ,   Exercise: no regular

## 2023-10-30 ENCOUNTER — OFFICE VISIT (OUTPATIENT)
Age: 23
End: 2023-10-30
Payer: MEDICAID

## 2023-10-30 VITALS
OXYGEN SATURATION: 98 % | SYSTOLIC BLOOD PRESSURE: 109 MMHG | WEIGHT: 196 LBS | HEART RATE: 65 BPM | RESPIRATION RATE: 20 BRPM | HEIGHT: 64 IN | TEMPERATURE: 97 F | DIASTOLIC BLOOD PRESSURE: 66 MMHG | BODY MASS INDEX: 33.46 KG/M2

## 2023-10-30 DIAGNOSIS — F32.1 CURRENT MODERATE EPISODE OF MAJOR DEPRESSIVE DISORDER, UNSPECIFIED WHETHER RECURRENT (HCC): Primary | ICD-10-CM

## 2023-10-30 DIAGNOSIS — F41.1 GAD (GENERALIZED ANXIETY DISORDER): ICD-10-CM

## 2023-10-30 DIAGNOSIS — E66.09 CLASS 2 OBESITY DUE TO EXCESS CALORIES WITH BODY MASS INDEX (BMI) OF 35.0 TO 35.9 IN ADULT, UNSPECIFIED WHETHER SERIOUS COMORBIDITY PRESENT: ICD-10-CM

## 2023-10-30 PROCEDURE — 99214 OFFICE O/P EST MOD 30 MIN: CPT | Performed by: STUDENT IN AN ORGANIZED HEALTH CARE EDUCATION/TRAINING PROGRAM

## 2023-10-30 RX ORDER — BUSPIRONE HYDROCHLORIDE 10 MG/1
TABLET ORAL
COMMUNITY
Start: 2023-10-18

## 2023-10-30 ASSESSMENT — ENCOUNTER SYMPTOMS
SHORTNESS OF BREATH: 0
BACK PAIN: 0
TROUBLE SWALLOWING: 0
EYE ITCHING: 0
WHEEZING: 0
VOICE CHANGE: 0
CONSTIPATION: 0
VOMITING: 0
RHINORRHEA: 0
DIARRHEA: 0
PHOTOPHOBIA: 0
SORE THROAT: 0
COUGH: 0
EYE DISCHARGE: 0
NAUSEA: 0
ABDOMINAL PAIN: 0

## 2023-10-30 NOTE — PROGRESS NOTES
Chief Complaint   Patient presents with    Obesity       1. Have you been to the ER, urgent care clinic since your last visit? Hospitalized since your last visit? No    2. Have you seen or consulted any other health care providers outside of the [address] [place] [address] Avenue since your last visit? Include any pap smears or colon screening.  No
walking, running/jogging, dancing, biking, swimming. Patient has also been encouraged to join a gym  Healthy diet: Eat real food! Avoid or minimize all processed food. Patient has been encouraged to set a target weight loss of 1 to 2 pounds per week  Patient has been applauded for her 13 pound weight loss since July 2023  Plan is acceptable to patient. Return in about 9 months (around 7/30/2024) for Well Visit. This note was dictated utilizing voice recognition software which may lead to typographical errors. I apologize in advance if the situation occurs. If questions arise please do not hesitate to contact me or call our department.      I spent 33 minutes with the patient in face-to-face consultation, of which greater than 50% was spent in counseling and coordination of care as described above

## 2024-01-18 ENCOUNTER — TELEMEDICINE (OUTPATIENT)
Age: 24
End: 2024-01-18
Payer: MEDICAID

## 2024-01-18 DIAGNOSIS — F33.9 RECURRENT MAJOR DEPRESSIVE DISORDER, REMISSION STATUS UNSPECIFIED (HCC): Primary | ICD-10-CM

## 2024-01-18 DIAGNOSIS — F41.1 GAD (GENERALIZED ANXIETY DISORDER): ICD-10-CM

## 2024-01-18 DIAGNOSIS — R09.82 POST-NASAL DRIP: ICD-10-CM

## 2024-01-18 DIAGNOSIS — Z00.00 WELL ADULT EXAM: ICD-10-CM

## 2024-01-18 DIAGNOSIS — E66.09 CLASS 2 OBESITY DUE TO EXCESS CALORIES WITH BODY MASS INDEX (BMI) OF 35.0 TO 35.9 IN ADULT, UNSPECIFIED WHETHER SERIOUS COMORBIDITY PRESENT: ICD-10-CM

## 2024-01-18 PROCEDURE — 99214 OFFICE O/P EST MOD 30 MIN: CPT | Performed by: STUDENT IN AN ORGANIZED HEALTH CARE EDUCATION/TRAINING PROGRAM

## 2024-01-18 RX ORDER — FLUTICASONE PROPIONATE 50 MCG
2 SPRAY, SUSPENSION (ML) NASAL DAILY
Qty: 1 EACH | Refills: 3 | Status: SHIPPED | OUTPATIENT
Start: 2024-01-18 | End: 2024-04-17

## 2024-01-18 ASSESSMENT — ENCOUNTER SYMPTOMS
COUGH: 0
VOMITING: 0
VOICE CHANGE: 0
EYE DISCHARGE: 0
SORE THROAT: 0
EYE ITCHING: 0
SHORTNESS OF BREATH: 0
RHINORRHEA: 0
WHEEZING: 0
DIARRHEA: 0
ABDOMINAL PAIN: 0
CONSTIPATION: 0
NAUSEA: 0
BACK PAIN: 0
PHOTOPHOBIA: 0
TROUBLE SWALLOWING: 0

## 2024-01-18 NOTE — PROGRESS NOTES
Christie Mac, was evaluated through a synchronous (real-time) audio-video encounter. The patient (or guardian if applicable) is aware that this is a billable service, which includes applicable co-pays. This Virtual Visit was conducted with patient's (and/or legal guardian's) consent. Patient identification was verified, and a caregiver was present when appropriate.   The patient was located at Home: [address]  [facility] [address]  Provider was located at Facility (Appt Dept): [address]  [place] [address]      Christie Mac (:  2000) is a Established patient, presenting virtually for evaluation of the following:    Assessment & Plan   Below is the assessment and plan developed based on review of pertinent history, physical exam, labs, studies, and medications.  1.  Recurrent major depression, remission status unspecified (HCC)  Pleasant 23 y.o. female with a past medical history of anxiety and depression presenting today for evaluation.   Patient states she has reestablished with a new psychiatrist (Mariia Nunez NP).  She currently denies any suicidal ideations.    Patient also appears happier today, she states she has joined a new [employer].  Patient currently on Ativan 0.5 mg daily as needed.  Patient was advised to call the crisis hotline for counseling if needed.    2. ANUM (generalized anxiety disorder)  See plan for MDD    3. Class 2 obesity due to excess calories with body mass index (BMI) of 33.0 to 33.9 in adult, unspecified whether serious comorbidity present  Patient reports having lost 2 pounds since our last visit  It is recommended that you complete aerobic exercise 30-40 minutes a day, at least 3-5 days a week. Aerobic exercises are activities such as brisk walking, running/jogging, dancing, biking, swimming.  Patient has also been encouraged to join a gym  Healthy diet: Eat real food!  Avoid or minimize all processed food.   Patient has been

## 2024-03-14 ENCOUNTER — OFFICE VISIT (OUTPATIENT)
Age: 24
End: 2024-03-14
Payer: MEDICAID

## 2024-03-14 VITALS
TEMPERATURE: 97.1 F | BODY MASS INDEX: 33.97 KG/M2 | HEIGHT: 64 IN | SYSTOLIC BLOOD PRESSURE: 122 MMHG | OXYGEN SATURATION: 72 % | DIASTOLIC BLOOD PRESSURE: 68 MMHG | RESPIRATION RATE: 18 BRPM | HEART RATE: 99 BPM | WEIGHT: 199 LBS

## 2024-03-14 DIAGNOSIS — F99 MENTAL HEALTH DISORDER: ICD-10-CM

## 2024-03-14 DIAGNOSIS — R41.3 MEMORY CHANGES: ICD-10-CM

## 2024-03-14 DIAGNOSIS — J30.1 SEASONAL ALLERGIC RHINITIS DUE TO POLLEN: Primary | ICD-10-CM

## 2024-03-14 DIAGNOSIS — L30.9 DERMATITIS: ICD-10-CM

## 2024-03-14 DIAGNOSIS — M32.8 OTHER FORMS OF SYSTEMIC LUPUS ERYTHEMATOSUS, UNSPECIFIED ORGAN INVOLVEMENT STATUS (HCC): ICD-10-CM

## 2024-03-14 PROCEDURE — 99214 OFFICE O/P EST MOD 30 MIN: CPT | Performed by: STUDENT IN AN ORGANIZED HEALTH CARE EDUCATION/TRAINING PROGRAM

## 2024-03-14 RX ORDER — LORATADINE 10 MG/1
10 TABLET ORAL DAILY
Qty: 90 TABLET | Refills: 1 | Status: SHIPPED | OUTPATIENT
Start: 2024-03-14 | End: 2024-09-10

## 2024-03-14 RX ORDER — FLUTICASONE PROPIONATE 50 MCG
2 SPRAY, SUSPENSION (ML) NASAL DAILY
Qty: 1 EACH | Refills: 3 | Status: SHIPPED | OUTPATIENT
Start: 2024-03-14

## 2024-03-14 ASSESSMENT — PATIENT HEALTH QUESTIONNAIRE - PHQ9
3. TROUBLE FALLING OR STAYING ASLEEP: 3
SUM OF ALL RESPONSES TO PHQ QUESTIONS 1-9: 6
SUM OF ALL RESPONSES TO PHQ QUESTIONS 1-9: 6
8. MOVING OR SPEAKING SO SLOWLY THAT OTHER PEOPLE COULD HAVE NOTICED. OR THE OPPOSITE, BEING SO FIGETY OR RESTLESS THAT YOU HAVE BEEN MOVING AROUND A LOT MORE THAN USUAL: 0
7. TROUBLE CONCENTRATING ON THINGS, SUCH AS READING THE NEWSPAPER OR WATCHING TELEVISION: 1
SUM OF ALL RESPONSES TO PHQ QUESTIONS 1-9: 6
2. FEELING DOWN, DEPRESSED OR HOPELESS: 1
4. FEELING TIRED OR HAVING LITTLE ENERGY: 1
SUM OF ALL RESPONSES TO PHQ9 QUESTIONS 1 & 2: 1
6. FEELING BAD ABOUT YOURSELF - OR THAT YOU ARE A FAILURE OR HAVE LET YOURSELF OR YOUR FAMILY DOWN: 0
5. POOR APPETITE OR OVEREATING: 0
9. THOUGHTS THAT YOU WOULD BE BETTER OFF DEAD, OR OF HURTING YOURSELF: 0
10. IF YOU CHECKED OFF ANY PROBLEMS, HOW DIFFICULT HAVE THESE PROBLEMS MADE IT FOR YOU TO DO YOUR WORK, TAKE CARE OF THINGS AT HOME, OR GET ALONG WITH OTHER PEOPLE: 1
SUM OF ALL RESPONSES TO PHQ QUESTIONS 1-9: 6
1. LITTLE INTEREST OR PLEASURE IN DOING THINGS: 0

## 2024-03-14 NOTE — PROGRESS NOTES
1. \"Have you been to the ER, urgent care clinic since your last visit?  Hospitalized since your last visit?\"  Yes, [facility]    2. \"Have you seen or consulted any other health care providers outside of the [facility] since your last visit?\"  Yes      3. For patients aged 45-75: Has the patient had a colonoscopy / FIT/ Cologuard? NA - based on age      If the patient is female:    4. For patients aged 40-74: Has the patient had a mammogram within the past 2 years? NA - based on age or sex      5. For patients aged 21-65: Has the patient had a pap smear? Yes - Care Gap present. Rooming MA/LPN to request most recent results  
monitor.    Dermatitis  Comments:  Likely 2/2 lupus flare.  Patient is currently on prednisone.  Encouraged to complete medication and follow-up with rheumatologist.    Memory changes  Comments:  Patient has upcoming appointment with psychiatry for evaluation.  Patient encouraged to ensure she keeps the appointment.  Patient confirms understanding.    Mental health disorder  Comments:  Patient has upcoming appointment with psychiatry for evaluation. Patient encouraged to ensure she keeps the appointment. Patient confirms understanding.    Return in about 6 weeks (around 4/25/2024) for F/u memory issues.    This note was dictated utilizing voice recognition software which may lead to typographical errors.  I apologize in advance if the situation occurs.  If questions arise please do not hesitate to contact me or call our department.    I spent 32 minutes with the patient in face-to-face consultation, of which greater than 50% was spent in counseling and coordination of care as described above

## 2024-03-15 PROBLEM — L30.9 DERMATITIS: Status: ACTIVE | Noted: 2018-09-18

## 2024-03-15 ASSESSMENT — ENCOUNTER SYMPTOMS
VOMITING: 0
PHOTOPHOBIA: 0
SHORTNESS OF BREATH: 0
ABDOMINAL PAIN: 0
VOICE CHANGE: 0
SORE THROAT: 0
WHEEZING: 0
TROUBLE SWALLOWING: 0
CONSTIPATION: 0
COUGH: 0
DIARRHEA: 0
EYE ITCHING: 0
RHINORRHEA: 0
BACK PAIN: 0
EYE DISCHARGE: 0
NAUSEA: 0

## 2024-07-22 ENCOUNTER — OFFICE VISIT (OUTPATIENT)
Facility: CLINIC | Age: 24
End: 2024-07-22
Payer: MEDICAID

## 2024-07-22 VITALS
RESPIRATION RATE: 18 BRPM | BODY MASS INDEX: 33.97 KG/M2 | OXYGEN SATURATION: 98 % | SYSTOLIC BLOOD PRESSURE: 128 MMHG | HEIGHT: 64 IN | WEIGHT: 199 LBS | DIASTOLIC BLOOD PRESSURE: 88 MMHG | HEART RATE: 70 BPM | TEMPERATURE: 98 F

## 2024-07-22 DIAGNOSIS — F41.1 GAD (GENERALIZED ANXIETY DISORDER): ICD-10-CM

## 2024-07-22 DIAGNOSIS — F33.9 RECURRENT MAJOR DEPRESSIVE DISORDER, REMISSION STATUS UNSPECIFIED (HCC): Primary | ICD-10-CM

## 2024-07-22 PROCEDURE — 99215 OFFICE O/P EST HI 40 MIN: CPT | Performed by: STUDENT IN AN ORGANIZED HEALTH CARE EDUCATION/TRAINING PROGRAM

## 2024-07-22 RX ORDER — FLUOXETINE HYDROCHLORIDE 20 MG/1
20 CAPSULE ORAL DAILY
Qty: 30 CAPSULE | Refills: 5 | Status: SHIPPED | OUTPATIENT
Start: 2024-07-22

## 2024-07-22 RX ORDER — MYCOPHENOLATE MOFETIL 500 MG/1
500 TABLET ORAL 2 TIMES DAILY
COMMUNITY
Start: 2024-07-04

## 2024-07-22 ASSESSMENT — ENCOUNTER SYMPTOMS
RHINORRHEA: 0
ABDOMINAL PAIN: 0
EYE DISCHARGE: 0
COUGH: 0
VOICE CHANGE: 0
WHEEZING: 0
CONSTIPATION: 0
BACK PAIN: 0
TROUBLE SWALLOWING: 0
EYE ITCHING: 0
NAUSEA: 0
DIARRHEA: 0
PHOTOPHOBIA: 0
SHORTNESS OF BREATH: 0
SORE THROAT: 0
VOMITING: 0

## 2024-07-22 NOTE — PROGRESS NOTES
1. \"Have you been to the ER, urgent care clinic since your last visit?  Hospitalized since your last visit?\"  Yes, [facility]    2. \"Have you seen or consulted any other health care providers outside of the [facility] since your last visit?\"  Yes, anastasiia      3. For patients aged 45-75: Has the patient had a colonoscopy / FIT/ Cologuard? NA - based on age      If the patient is female:    4. For patients aged 40-74: Has the patient had a mammogram within the past 2 years? NA - based on age or sex      5. For patients aged 21-65: Has the patient had a pap smear? yes  
postnasal drip, rhinorrhea, sore throat, trouble swallowing and voice change.    Eyes:  Negative for photophobia, discharge, itching and visual disturbance.   Respiratory:  Negative for cough, shortness of breath and wheezing.    Cardiovascular:  Negative for chest pain, palpitations and leg swelling.   Gastrointestinal:  Negative for abdominal pain, constipation, diarrhea, nausea and vomiting.   Genitourinary:  Negative for difficulty urinating and dysuria.   Musculoskeletal:  Negative for arthralgias and back pain.   Skin:  Negative for rash.   Neurological:  Negative for dizziness, weakness, light-headedness and headaches.   Psychiatric/Behavioral:  Positive for confusion, dysphoric mood and sleep disturbance. The patient is nervous/anxious.      /88 (Site: Left Upper Arm, Position: Sitting, Cuff Size: Medium Adult)   Pulse 70   Temp 98 °F (36.7 °C) (Temporal)   Resp 18   Ht 1.626 m (5' 4\")   Wt 90.3 kg (199 lb)   SpO2 98%   BMI 34.16 kg/m²      Objective   Physical Exam  Vitals reviewed.   Constitutional:       General: She is not in acute distress.     Appearance: Normal appearance. She is obese. She is not ill-appearing, toxic-appearing or diaphoretic.   HENT:      Head: Normocephalic and atraumatic.      Right Ear: External ear normal.      Left Ear: External ear normal.   Eyes:      General:         Right eye: No discharge.         Left eye: No discharge.   Cardiovascular:      Rate and Rhythm: Normal rate and regular rhythm.      Pulses: Normal pulses.      Heart sounds: Normal heart sounds. No murmur heard.     No friction rub. No gallop.   Pulmonary:      Effort: Pulmonary effort is normal.      Breath sounds: Normal breath sounds. No wheezing, rhonchi or rales.   Musculoskeletal:         General: Normal range of motion.      Cervical back: Normal range of motion.   Skin:     General: Skin is warm.   Neurological:      Mental Status: She is alert and oriented to person, place, and time.

## 2024-08-22 ENCOUNTER — TELEPHONE (OUTPATIENT)
Facility: CLINIC | Age: 24
End: 2024-08-22

## 2024-08-22 DIAGNOSIS — E55.9 VITAMIN D DEFICIENCY: Primary | ICD-10-CM

## 2024-08-29 ENCOUNTER — OFFICE VISIT (OUTPATIENT)
Facility: CLINIC | Age: 24
End: 2024-08-29

## 2024-08-29 VITALS
OXYGEN SATURATION: 100 % | TEMPERATURE: 97.4 F | HEART RATE: 72 BPM | SYSTOLIC BLOOD PRESSURE: 128 MMHG | WEIGHT: 198 LBS | DIASTOLIC BLOOD PRESSURE: 75 MMHG | BODY MASS INDEX: 33.8 KG/M2 | RESPIRATION RATE: 18 BRPM | HEIGHT: 64 IN

## 2024-08-29 DIAGNOSIS — F41.1 GAD (GENERALIZED ANXIETY DISORDER): ICD-10-CM

## 2024-08-29 DIAGNOSIS — G47.00 INSOMNIA, UNSPECIFIED TYPE: ICD-10-CM

## 2024-08-29 DIAGNOSIS — Z00.00 WELL ADULT EXAM: ICD-10-CM

## 2024-08-29 DIAGNOSIS — F33.9 EPISODE OF RECURRENT MAJOR DEPRESSIVE DISORDER, UNSPECIFIED DEPRESSION EPISODE SEVERITY (HCC): Primary | ICD-10-CM

## 2024-08-29 RX ORDER — TRAZODONE HYDROCHLORIDE 50 MG/1
50 TABLET, FILM COATED ORAL NIGHTLY
Qty: 30 TABLET | Refills: 0 | Status: SHIPPED | OUTPATIENT
Start: 2024-08-29

## 2024-08-29 SDOH — ECONOMIC STABILITY: INCOME INSECURITY: HOW HARD IS IT FOR YOU TO PAY FOR THE VERY BASICS LIKE FOOD, HOUSING, MEDICAL CARE, AND HEATING?: NOT HARD AT ALL

## 2024-08-29 SDOH — ECONOMIC STABILITY: FOOD INSECURITY: WITHIN THE PAST 12 MONTHS, THE FOOD YOU BOUGHT JUST DIDN'T LAST AND YOU DIDN'T HAVE MONEY TO GET MORE.: NEVER TRUE

## 2024-08-29 SDOH — ECONOMIC STABILITY: FOOD INSECURITY: WITHIN THE PAST 12 MONTHS, YOU WORRIED THAT YOUR FOOD WOULD RUN OUT BEFORE YOU GOT MONEY TO BUY MORE.: NEVER TRUE

## 2024-08-29 ASSESSMENT — ENCOUNTER SYMPTOMS
NAUSEA: 0
TROUBLE SWALLOWING: 0
BACK PAIN: 0
EYE DISCHARGE: 0
PHOTOPHOBIA: 0
VOICE CHANGE: 0
WHEEZING: 0
CONSTIPATION: 0
SHORTNESS OF BREATH: 0
RHINORRHEA: 0
SORE THROAT: 0
ABDOMINAL PAIN: 0
VOMITING: 0
EYE ITCHING: 0
COUGH: 0
DIARRHEA: 0

## 2024-08-29 NOTE — PROGRESS NOTES
Christie Mac (:  2000) is a 24 y.o. female,Established patient, here for evaluation of the following chief complaint(s):  Follow-up (Patient was seen at [facility] on 2024 for hallucinations and anxiety.)         Assessment & Plan  Episode of recurrent major depressive disorder, unspecified depression episode severity (HCC)   Pleasant 24 y.o. female with a past medical history of anxiety and depression presenting today for evaluation.  PHQ 9:->21, ANUM:->19  Patient has tried multiple SSRIs but DC'd 2/2 not making her feel right.  Plan is to temporarily begin trazodone 50 mg daily patient has been advised to be consistent with medication  Patient is scheduled to follow-up with a psychiatrist on 2024  Patient was advised to call the crisis hotline for counseling if needed.    Orders:    traZODone (DESYREL) 50 MG tablet; Take 1 tablet by mouth nightly    Insomnia, unspecified type       Orders:    traZODone (DESYREL) 50 MG tablet; Take 1 tablet by mouth nightly    ANUM (generalized anxiety disorder)    See plan for MDD         Well adult exam    Upcoming in 6 weeks    Orders:    CBC with Auto Differential; Future    Comprehensive Metabolic Panel; Future      Return in about 7 weeks (around 10/17/2024), or if symptoms worsen or fail to improve, for CPE.       Subjective   History of Present Illness  The patient presents for evaluation of anxiety.    She has recently started seeing a new psychiatrist at [facility]. She was prescribed Paxil, which she took for approximately 20 days. However, the medication seemed to exacerbate her anxiety, leading to its discontinuation. Following this, she experienced withdrawal symptoms such as heightened anxiety, depression, palpitations, and cognitive difficulties, particularly at work. These symptoms were so severe that she had to leave work early due to dizziness and fatigue.     She sought medical attention at a nearby hospital where she was

## 2024-08-30 LAB
A/G RATIO: 1.6 RATIO (ref 1.1–2.6)
ALBUMIN: 4.3 G/DL (ref 3.5–5)
ALP BLD-CCNC: 71 U/L (ref 25–115)
ALT SERPL-CCNC: 16 U/L (ref 5–40)
ANION GAP SERPL CALCULATED.3IONS-SCNC: 11 MMOL/L (ref 3–15)
AST SERPL-CCNC: 21 U/L (ref 10–37)
BILIRUB SERPL-MCNC: 0.3 MG/DL (ref 0.2–1.2)
BUN BLDV-MCNC: 17 MG/DL (ref 6–22)
CALCIUM SERPL-MCNC: 9.5 MG/DL (ref 8.4–10.5)
CHLORIDE BLD-SCNC: 104 MMOL/L (ref 98–110)
CO2: 23 MMOL/L (ref 20–32)
CREAT SERPL-MCNC: 0.8 MG/DL (ref 0.5–1.2)
GFR, ESTIMATED: >60 ML/MIN/1.73 SQ.M.
GLOBULIN: 2.7 G/DL (ref 2–4)
GLUCOSE: 72 MG/DL (ref 70–99)
HCT VFR BLD CALC: 44.9 % (ref 35.1–46.5)
HEMOGLOBIN: 13.4 G/DL (ref 11.7–15.5)
MCH RBC QN AUTO: 28 PG (ref 26–34)
MCHC RBC AUTO-ENTMCNC: 30 G/DL (ref 31–36)
MCV RBC AUTO: 93 FL (ref 80–99)
PDW BLD-RTO: 13.1 % (ref 10–15.5)
PLATELET # BLD: 232 K/UL (ref 140–440)
PMV BLD AUTO: 12.1 FL (ref 9–13)
POTASSIUM SERPL-SCNC: 4.6 MMOL/L (ref 3.5–5.5)
RBC # BLD: 4.83 M/UL (ref 3.8–5.2)
SODIUM BLD-SCNC: 138 MMOL/L (ref 133–145)
TOTAL PROTEIN: 7 G/DL (ref 6.4–8.3)
VITAMIN D 25-HYDROXY: 26.8 NG/ML (ref 32–100)
WBC # BLD: 4.7 K/UL (ref 4–11)

## 2024-10-24 ENCOUNTER — TELEMEDICINE (OUTPATIENT)
Facility: CLINIC | Age: 24
End: 2024-10-24
Payer: MEDICAID

## 2024-10-24 DIAGNOSIS — F41.1 GAD (GENERALIZED ANXIETY DISORDER): ICD-10-CM

## 2024-10-24 DIAGNOSIS — F33.2 SEVERE EPISODE OF RECURRENT MAJOR DEPRESSIVE DISORDER, WITHOUT PSYCHOTIC FEATURES (HCC): Primary | ICD-10-CM

## 2024-10-24 PROCEDURE — 99214 OFFICE O/P EST MOD 30 MIN: CPT | Performed by: STUDENT IN AN ORGANIZED HEALTH CARE EDUCATION/TRAINING PROGRAM

## 2024-10-24 RX ORDER — BUSPIRONE HYDROCHLORIDE 10 MG/1
10 TABLET ORAL 2 TIMES DAILY
COMMUNITY

## 2024-10-24 RX ORDER — CARIPRAZINE 1.5 MG/1
1.5 CAPSULE, GELATIN COATED ORAL NIGHTLY
COMMUNITY
Start: 2024-10-18

## 2024-10-24 ASSESSMENT — ENCOUNTER SYMPTOMS
TROUBLE SWALLOWING: 0
NAUSEA: 0
DIARRHEA: 0
VOMITING: 0
PHOTOPHOBIA: 0
VOICE CHANGE: 0
SHORTNESS OF BREATH: 0
RHINORRHEA: 0
EYE DISCHARGE: 0
ABDOMINAL PAIN: 0
WHEEZING: 0
SORE THROAT: 0
COUGH: 0
CONSTIPATION: 0
EYE ITCHING: 0
BACK PAIN: 0

## 2024-10-24 ASSESSMENT — PATIENT HEALTH QUESTIONNAIRE - PHQ9
8. MOVING OR SPEAKING SO SLOWLY THAT OTHER PEOPLE COULD HAVE NOTICED. OR THE OPPOSITE, BEING SO FIGETY OR RESTLESS THAT YOU HAVE BEEN MOVING AROUND A LOT MORE THAN USUAL: NOT AT ALL
2. FEELING DOWN, DEPRESSED OR HOPELESS: NEARLY EVERY DAY
7. TROUBLE CONCENTRATING ON THINGS, SUCH AS READING THE NEWSPAPER OR WATCHING TELEVISION: NEARLY EVERY DAY
4. FEELING TIRED OR HAVING LITTLE ENERGY: NEARLY EVERY DAY
SUM OF ALL RESPONSES TO PHQ QUESTIONS 1-9: 19
SUM OF ALL RESPONSES TO PHQ QUESTIONS 1-9: 20
3. TROUBLE FALLING OR STAYING ASLEEP: NEARLY EVERY DAY
10. IF YOU CHECKED OFF ANY PROBLEMS, HOW DIFFICULT HAVE THESE PROBLEMS MADE IT FOR YOU TO DO YOUR WORK, TAKE CARE OF THINGS AT HOME, OR GET ALONG WITH OTHER PEOPLE: EXTREMELY DIFFICULT
SUM OF ALL RESPONSES TO PHQ QUESTIONS 1-9: 20
1. LITTLE INTEREST OR PLEASURE IN DOING THINGS: NEARLY EVERY DAY
SUM OF ALL RESPONSES TO PHQ QUESTIONS 1-9: 20
9. THOUGHTS THAT YOU WOULD BE BETTER OFF DEAD, OR OF HURTING YOURSELF: SEVERAL DAYS
SUM OF ALL RESPONSES TO PHQ9 QUESTIONS 1 & 2: 6
5. POOR APPETITE OR OVEREATING: SEVERAL DAYS
6. FEELING BAD ABOUT YOURSELF - OR THAT YOU ARE A FAILURE OR HAVE LET YOURSELF OR YOUR FAMILY DOWN: NEARLY EVERY DAY

## 2024-10-24 ASSESSMENT — ANXIETY QUESTIONNAIRES
6. BECOMING EASILY ANNOYED OR IRRITABLE: NEARLY EVERY DAY
3. WORRYING TOO MUCH ABOUT DIFFERENT THINGS: NEARLY EVERY DAY
1. FEELING NERVOUS, ANXIOUS, OR ON EDGE: NEARLY EVERY DAY
4. TROUBLE RELAXING: NEARLY EVERY DAY
2. NOT BEING ABLE TO STOP OR CONTROL WORRYING: NEARLY EVERY DAY
GAD7 TOTAL SCORE: 19
7. FEELING AFRAID AS IF SOMETHING AWFUL MIGHT HAPPEN: NEARLY EVERY DAY
IF YOU CHECKED OFF ANY PROBLEMS ON THIS QUESTIONNAIRE, HOW DIFFICULT HAVE THESE PROBLEMS MADE IT FOR YOU TO DO YOUR WORK, TAKE CARE OF THINGS AT HOME, OR GET ALONG WITH OTHER PEOPLE: EXTREMELY DIFFICULT
5. BEING SO RESTLESS THAT IT IS HARD TO SIT STILL: SEVERAL DAYS

## 2024-10-24 ASSESSMENT — COLUMBIA-SUICIDE SEVERITY RATING SCALE - C-SSRS
6. HAVE YOU EVER DONE ANYTHING, STARTED TO DO ANYTHING, OR PREPARED TO DO ANYTHING TO END YOUR LIFE?: NO
1. WITHIN THE PAST MONTH, HAVE YOU WISHED YOU WERE DEAD OR WISHED YOU COULD GO TO SLEEP AND NOT WAKE UP?: YES
2. HAVE YOU ACTUALLY HAD ANY THOUGHTS OF KILLING YOURSELF?: NO

## 2024-10-24 NOTE — PROGRESS NOTES
\"Have you been to the ER, urgent care clinic since your last visit?  Hospitalized since your last visit?\"    NO    “Have you seen or consulted any other health care providers outside our system since your last visit?”    YES - When: approximately 7 days ago.  Where and Why: psychiatrist.     “Have you had a pap smear?”    YES - Where: [facility] Nurse/CMA to request most recent records if not in the chart    No cervical cancer screening on file         Click Here for Release of Records Request  
apologize in advance if the situation occurs.  If questions arise please do not hesitate to contact me or call our department.    The patient (or guardian, if applicable) and other individuals in attendance with the patient were advised that Artificial Intelligence will be utilized during this visit to record, process the conversation to generate a clinical note, and support improvement of the AI technology. The patient (or guardian, if applicable) and other individuals in attendance at the appointment consented to the use of AI, including the recording.        --Jesus Manuel Corrales DO

## 2024-11-05 ENCOUNTER — OFFICE VISIT (OUTPATIENT)
Facility: CLINIC | Age: 24
End: 2024-11-05

## 2024-11-05 VITALS
TEMPERATURE: 97.5 F | BODY MASS INDEX: 35.34 KG/M2 | WEIGHT: 207 LBS | SYSTOLIC BLOOD PRESSURE: 107 MMHG | DIASTOLIC BLOOD PRESSURE: 74 MMHG | HEART RATE: 72 BPM | RESPIRATION RATE: 18 BRPM | HEIGHT: 64 IN | OXYGEN SATURATION: 98 %

## 2024-11-05 DIAGNOSIS — Z09 HOSPITAL DISCHARGE FOLLOW-UP: Primary | ICD-10-CM

## 2024-11-05 RX ORDER — GUANFACINE 3 MG/1
TABLET, EXTENDED RELEASE ORAL
COMMUNITY
Start: 2024-11-01

## 2024-11-05 RX ORDER — VILAZODONE HYDROCHLORIDE 10 MG/1
TABLET ORAL
COMMUNITY
Start: 2024-11-01

## 2024-11-05 RX ORDER — OLANZAPINE 10 MG/1
TABLET ORAL
COMMUNITY
Start: 2024-11-01

## 2024-11-05 ASSESSMENT — ENCOUNTER SYMPTOMS
TROUBLE SWALLOWING: 0
VOICE CHANGE: 0
VOMITING: 0
BACK PAIN: 0
COUGH: 0
PHOTOPHOBIA: 0
SORE THROAT: 0
WHEEZING: 0
NAUSEA: 0
SHORTNESS OF BREATH: 0
CONSTIPATION: 0
ABDOMINAL PAIN: 0
EYE ITCHING: 0
RHINORRHEA: 0
EYE DISCHARGE: 0
DIARRHEA: 0

## 2024-11-05 NOTE — PROGRESS NOTES
\"Have you been to the ER, urgent care clinic since your last visit?  Hospitalized since your last visit?\"    NO    “Have you seen or consulted any other health care providers outside our system since your last visit?”    YES - When: approximately 3 days ago.  Where and Why: [facility].     “Have you had a pap smear?”    NO    No cervical cancer screening on file         Click Here for Release of Records Request

## 2024-11-05 NOTE — PROGRESS NOTES
Post-Discharge Transitional Care Follow Up    Christie Mac   YOB: 2000    Date of Office Visit:  11/5/2024  Date of Hospital Admission: 10/29/2020  Date of Hospital Discharge: 11/1/2024    Care management risk score Rising risk (score 2-5) and Complex Care (Scores >=6): No Risk Score On File     Non face to face  following discharge, date last encounter closed (first attempt may have been earlier): *No documented post hospital discharge outreach found in the last 14 days     Call initiated 2 business days of discharge: *No response recorded in the last 14 days    ASSESSMENT/PLAN:   Hospital discharge follow-up  -     LA DISCHARGE MEDS RECONCILED W/ CURRENT OUTPATIENT MED LIST    Medical Decision Making: straightforward  Return in about 12 weeks (around 1/28/2025) for Low BP concerns..           Subjective:   HPI  History of Present Illness  The patient presents for a follow-up visit.    She reports a slight improvement in her condition. She was previously admitted to [facility] from 10/29/2024 to 11/01/2024 for partial hospitalization due to paranoia. Her current medications include Zyprexa 10 mg daily, guanfacine 3 mg daily for ADHD, and Viibryd 10 mg daily. She had a consultation with her psychiatrist today regarding her medication regimen.    She experiences significant drowsiness and dizziness, which she attributes to her medications. She had an episode of severe dizziness at work last night, which required assistance. She also reports difficulty focusing and has been advised by her manager to take a leave of absence, which she is unable to do. She has been advised to take Aleve to help her adjust to the medications.    She monitors her blood pressure at home, which is typically in the 120s systolic range. She has another appointment with her psychiatrist in 12/2024.    She reports no thoughts of self-harm or harm to others. She is not currently taking BuSpar or Vraylar. She has

## 2024-12-12 LAB — PAP SMEAR, EXTERNAL: NEGATIVE

## 2024-12-13 ENCOUNTER — PATIENT MESSAGE (OUTPATIENT)
Facility: CLINIC | Age: 24
End: 2024-12-13

## 2024-12-16 DIAGNOSIS — Z11.1 SCREENING FOR TUBERCULOSIS: Primary | ICD-10-CM

## 2024-12-17 NOTE — TELEPHONE ENCOUNTER
I spoke with patient yesterday, she states that she had a PPD placed yesterday but she still is requesting that the lab is sent to her for the blood test as well.. Beata ordered the lab for her and I sent her a copy of the lab through [employer].

## 2025-03-13 ENCOUNTER — TELEMEDICINE (OUTPATIENT)
Facility: CLINIC | Age: 25
End: 2025-03-13
Payer: MEDICAID

## 2025-03-13 DIAGNOSIS — R91.1 INCIDENTAL PULMONARY NODULE, > 3MM AND < 8MM: ICD-10-CM

## 2025-03-13 DIAGNOSIS — M32.9 SYSTEMIC LUPUS ERYTHEMATOSUS, UNSPECIFIED SLE TYPE, UNSPECIFIED ORGAN INVOLVEMENT STATUS (HCC): Primary | ICD-10-CM

## 2025-03-13 DIAGNOSIS — Z00.00 WELL ADULT EXAM: ICD-10-CM

## 2025-03-13 DIAGNOSIS — I82.5Z2 CHRONIC DEEP VEIN THROMBOSIS (DVT) OF DISTAL VEIN OF LEFT LOWER EXTREMITY (HCC): ICD-10-CM

## 2025-03-13 DIAGNOSIS — F31.81 BIPOLAR 2 DISORDER (HCC): ICD-10-CM

## 2025-03-13 DIAGNOSIS — F33.1 MODERATE EPISODE OF RECURRENT MAJOR DEPRESSIVE DISORDER (HCC): ICD-10-CM

## 2025-03-13 PROCEDURE — 99215 OFFICE O/P EST HI 40 MIN: CPT | Performed by: STUDENT IN AN ORGANIZED HEALTH CARE EDUCATION/TRAINING PROGRAM

## 2025-03-13 RX ORDER — RIVAROXABAN 10 MG/1
10 TABLET, FILM COATED ORAL
COMMUNITY
Start: 2025-03-10

## 2025-03-13 SDOH — ECONOMIC STABILITY: FOOD INSECURITY: WITHIN THE PAST 12 MONTHS, THE FOOD YOU BOUGHT JUST DIDN'T LAST AND YOU DIDN'T HAVE MONEY TO GET MORE.: NEVER TRUE

## 2025-03-13 SDOH — ECONOMIC STABILITY: TRANSPORTATION INSECURITY
IN THE PAST 12 MONTHS, HAS LACK OF TRANSPORTATION KEPT YOU FROM MEETINGS, WORK, OR FROM GETTING THINGS NEEDED FOR DAILY LIVING?: NO

## 2025-03-13 SDOH — ECONOMIC STABILITY: INCOME INSECURITY: IN THE LAST 12 MONTHS, WAS THERE A TIME WHEN YOU WERE NOT ABLE TO PAY THE MORTGAGE OR RENT ON TIME?: NO

## 2025-03-13 SDOH — ECONOMIC STABILITY: FOOD INSECURITY: WITHIN THE PAST 12 MONTHS, YOU WORRIED THAT YOUR FOOD WOULD RUN OUT BEFORE YOU GOT MONEY TO BUY MORE.: NEVER TRUE

## 2025-03-13 SDOH — ECONOMIC STABILITY: TRANSPORTATION INSECURITY
IN THE PAST 12 MONTHS, HAS THE LACK OF TRANSPORTATION KEPT YOU FROM MEDICAL APPOINTMENTS OR FROM GETTING MEDICATIONS?: NO

## 2025-03-13 ASSESSMENT — ENCOUNTER SYMPTOMS
EYE ITCHING: 0
PHOTOPHOBIA: 0
DIARRHEA: 0
CONSTIPATION: 0
EYE DISCHARGE: 0
NAUSEA: 0
WHEEZING: 0
RHINORRHEA: 0
SORE THROAT: 0
COUGH: 0
BACK PAIN: 0
ABDOMINAL PAIN: 0
VOMITING: 0
VOICE CHANGE: 0
TROUBLE SWALLOWING: 0
SHORTNESS OF BREATH: 0

## 2025-03-13 ASSESSMENT — PATIENT HEALTH QUESTIONNAIRE - PHQ9
1. LITTLE INTEREST OR PLEASURE IN DOING THINGS: SEVERAL DAYS
SUM OF ALL RESPONSES TO PHQ QUESTIONS 1-9: 1

## 2025-03-13 NOTE — PROGRESS NOTES
with no signs of ataxia         [x] Normal range of motion of neck        [] Abnormal -     Neurological:        [x] No Facial Asymmetry (Cranial nerve 7 motor function) (limited exam due to video visit)          [x] No gaze palsy        [] Abnormal -          Skin:        [x] No significant exanthematous lesions or discoloration noted on facial skin         [] Abnormal -            Psychiatric:       [x] Normal Affect [] Abnormal -        [x] No Hallucinations    Other pertinent observable physical exam findings:-         On this date 3/13/2025 I have spent 42 minutes reviewing previous notes, test results and face to face (virtual) with the patient discussing the diagnosis and importance of compliance with the treatment plan as well as documenting on the day of the visit.    This note was dictated utilizing voice recognition software which may lead to typographical errors.  I apologize in advance if the situation occurs.  If questions arise please do not hesitate to contact me or call our department.    The patient (or guardian, if applicable) and other individuals in attendance with the patient were advised that Artificial Intelligence will be utilized during this visit to record, process the conversation to generate a clinical note, and support improvement of the AI technology. The patient (or guardian, if applicable) and other individuals in attendance at the appointment consented to the use of AI, including the recording.        --Jesus Manuel Corrales DO

## 2025-03-25 ENCOUNTER — TELEPHONE (OUTPATIENT)
Facility: CLINIC | Age: 25
End: 2025-03-25

## 2025-03-25 DIAGNOSIS — R91.1 INCIDENTAL PULMONARY NODULE, > 3MM AND < 8MM: Primary | ICD-10-CM

## 2025-03-25 NOTE — TELEPHONE ENCOUNTER
[facility] Radiology Dept called stating that they will need the CT orders updated with a different code, as the current one is incorrect.    They need the procedure code to be [id number].

## 2025-03-27 ENCOUNTER — TELEPHONE (OUTPATIENT)
Facility: CLINIC | Age: 25
End: 2025-03-27

## 2025-03-27 ENCOUNTER — OFFICE VISIT (OUTPATIENT)
Facility: CLINIC | Age: 25
End: 2025-03-27
Payer: MEDICAID

## 2025-03-27 VITALS
TEMPERATURE: 97.1 F | SYSTOLIC BLOOD PRESSURE: 116 MMHG | DIASTOLIC BLOOD PRESSURE: 79 MMHG | HEART RATE: 75 BPM | RESPIRATION RATE: 18 BRPM | OXYGEN SATURATION: 99 % | WEIGHT: 219 LBS | HEIGHT: 64 IN | BODY MASS INDEX: 37.39 KG/M2

## 2025-03-27 DIAGNOSIS — G56.01 CARPAL TUNNEL SYNDROME OF RIGHT WRIST: ICD-10-CM

## 2025-03-27 DIAGNOSIS — M25.562 PAIN IN BOTH KNEES, UNSPECIFIED CHRONICITY: Primary | ICD-10-CM

## 2025-03-27 DIAGNOSIS — M25.561 PAIN IN BOTH KNEES, UNSPECIFIED CHRONICITY: Primary | ICD-10-CM

## 2025-03-27 PROCEDURE — 99213 OFFICE O/P EST LOW 20 MIN: CPT | Performed by: STUDENT IN AN ORGANIZED HEALTH CARE EDUCATION/TRAINING PROGRAM

## 2025-03-27 SDOH — ECONOMIC STABILITY: FOOD INSECURITY: WITHIN THE PAST 12 MONTHS, THE FOOD YOU BOUGHT JUST DIDN'T LAST AND YOU DIDN'T HAVE MONEY TO GET MORE.: NEVER TRUE

## 2025-03-27 SDOH — ECONOMIC STABILITY: FOOD INSECURITY: WITHIN THE PAST 12 MONTHS, YOU WORRIED THAT YOUR FOOD WOULD RUN OUT BEFORE YOU GOT MONEY TO BUY MORE.: NEVER TRUE

## 2025-03-27 ASSESSMENT — ENCOUNTER SYMPTOMS
NAUSEA: 0
WHEEZING: 0
VOICE CHANGE: 0
PHOTOPHOBIA: 0
VOMITING: 0
EYE ITCHING: 0
COUGH: 0
CONSTIPATION: 0
EYE DISCHARGE: 0
SHORTNESS OF BREATH: 0
RHINORRHEA: 0
BACK PAIN: 0
DIARRHEA: 0
SORE THROAT: 0
TROUBLE SWALLOWING: 0
ABDOMINAL PAIN: 0

## 2025-03-27 ASSESSMENT — PATIENT HEALTH QUESTIONNAIRE - PHQ9
9. THOUGHTS THAT YOU WOULD BE BETTER OFF DEAD, OR OF HURTING YOURSELF: NOT AT ALL
1. LITTLE INTEREST OR PLEASURE IN DOING THINGS: MORE THAN HALF THE DAYS
5. POOR APPETITE OR OVEREATING: MORE THAN HALF THE DAYS
10. IF YOU CHECKED OFF ANY PROBLEMS, HOW DIFFICULT HAVE THESE PROBLEMS MADE IT FOR YOU TO DO YOUR WORK, TAKE CARE OF THINGS AT HOME, OR GET ALONG WITH OTHER PEOPLE: VERY DIFFICULT
SUM OF ALL RESPONSES TO PHQ QUESTIONS 1-9: 13
SUM OF ALL RESPONSES TO PHQ QUESTIONS 1-9: 13
2. FEELING DOWN, DEPRESSED OR HOPELESS: MORE THAN HALF THE DAYS
SUM OF ALL RESPONSES TO PHQ QUESTIONS 1-9: 13
4. FEELING TIRED OR HAVING LITTLE ENERGY: NEARLY EVERY DAY
6. FEELING BAD ABOUT YOURSELF - OR THAT YOU ARE A FAILURE OR HAVE LET YOURSELF OR YOUR FAMILY DOWN: NOT AT ALL
SUM OF ALL RESPONSES TO PHQ QUESTIONS 1-9: 13
8. MOVING OR SPEAKING SO SLOWLY THAT OTHER PEOPLE COULD HAVE NOTICED. OR THE OPPOSITE, BEING SO FIGETY OR RESTLESS THAT YOU HAVE BEEN MOVING AROUND A LOT MORE THAN USUAL: NOT AT ALL
7. TROUBLE CONCENTRATING ON THINGS, SUCH AS READING THE NEWSPAPER OR WATCHING TELEVISION: SEVERAL DAYS
3. TROUBLE FALLING OR STAYING ASLEEP: NEARLY EVERY DAY

## 2025-03-27 NOTE — PROGRESS NOTES
\"Have you been to the ER, urgent care clinic since your last visit?  Hospitalized since your last visit?\"    NO    “Have you seen or consulted any other health care providers outside our system since your last visit?”    NO     “Have you had a pap smear?”    YES - Where: 10/2024; [facility] Nurse/CMA to request most recent records if not in the chart    No cervical cancer screening on file         Click Here for Release of Records Request

## 2025-03-27 NOTE — TELEPHONE ENCOUNTER
Patient stated she was told by central scheduling that they are unable to schedule her CT due to the orders being incorrect.

## 2025-03-27 NOTE — PROGRESS NOTES
Christie Mac (:  2000) is a 24 y.o. female, Established patient, here for evaluation of the following chief complaint(s):  Back Pain (Patient has c/o back pain x's 1 year. ) and Other (Patient is requesting a note to cancel her gym membership due to medical conditions. )         Assessment & Plan  1. Left-sided back pain.  - Reports experiencing numbness, itchiness, and pain in the left upper back for almost a year, which has progressively worsened.  - Physical examination did not reveal any visible abnormalities.  - Advised to wait for the results of the upcoming CT scan of the chest, which may provide more information.  - Continue using a heating pad and Biofreeze for symptomatic relief.    2. Bilateral knee pain.  - Reports bilateral knee pain, with the left knee being more affected, rated at an 8/10 in severity, exacerbated by activities such as squats.  - Unable to take pain medications and manages the pain with a heating pad and Biofreeze.  - A note will be provided to excuse her from gym activities due to her health condition.  - No additional medications prescribed at this time.    3. Carpal tunnel syndrome.  - Scheduled for carpal tunnel surgery on her right hand on 2025.  - Follow up with her hand surgeon as planned.  - Continue to monitor symptoms and manage as advised by the surgeon.    4. Anxiety.  - Currently taking propranolol for anxiety.  - Reports feeling lightheaded and as if she is not getting enough oxygen to her brain, which improves with eating.  - Advised to continue monitoring her symptoms and follow up with her psychiatrist as needed.  - No changes to current medication regimen.    Follow-up  - The patient will follow up in 4 months for a physical examination.    Results    1. Pain in both knees, unspecified chronicity  2. Carpal tunnel syndrome of right wrist    Return in about 4 months (around 2025) for CPE labs 1 week prior.       Subjective   History of

## 2025-03-28 ENCOUNTER — TELEPHONE (OUTPATIENT)
Facility: CLINIC | Age: 25
End: 2025-03-28

## 2025-03-28 DIAGNOSIS — R91.1 INCIDENTAL PULMONARY NODULE, > 3MM AND < 8MM: Primary | ICD-10-CM

## 2025-03-28 NOTE — TELEPHONE ENCOUNTER
Tried calling patient to let her know that her order for the CT scan has been resubmitted and she just needs to call central scheduling to schedule the appointment. Left vm for to call central scheduling to set up CT scan.

## 2025-04-08 ENCOUNTER — HOSPITAL ENCOUNTER (OUTPATIENT)
Facility: HOSPITAL | Age: 25
Discharge: HOME | End: 2025-04-11
Attending: STUDENT IN AN ORGANIZED HEALTH CARE EDUCATION/TRAINING PROGRAM
Payer: MEDICAID

## 2025-04-08 DIAGNOSIS — R91.1 INCIDENTAL PULMONARY NODULE, > 3MM AND < 8MM: ICD-10-CM

## 2025-04-08 PROCEDURE — 71250 CT THORAX DX C-: CPT

## 2025-04-11 ENCOUNTER — TELEPHONE (OUTPATIENT)
Facility: CLINIC | Age: 25
End: 2025-04-11

## 2025-04-11 NOTE — TELEPHONE ENCOUNTER
Patient called requesting to speak to Dr. Mukherjee regarding the results from her recent CT scan. Patient stated she is concerned about the findings of the results and wanted to go over them with him.

## 2025-04-13 DIAGNOSIS — N63.0 BREAST NODULE: Primary | ICD-10-CM

## 2025-04-21 ENCOUNTER — OFFICE VISIT (OUTPATIENT)
Facility: CLINIC | Age: 25
End: 2025-04-21
Payer: MEDICAID

## 2025-04-21 VITALS
OXYGEN SATURATION: 98 % | HEIGHT: 64 IN | HEART RATE: 62 BPM | WEIGHT: 219 LBS | DIASTOLIC BLOOD PRESSURE: 81 MMHG | TEMPERATURE: 98.2 F | BODY MASS INDEX: 37.39 KG/M2 | SYSTOLIC BLOOD PRESSURE: 125 MMHG

## 2025-04-21 DIAGNOSIS — G89.29 CHRONIC LEFT-SIDED THORACIC BACK PAIN: Primary | ICD-10-CM

## 2025-04-21 DIAGNOSIS — M32.9 SYSTEMIC LUPUS ERYTHEMATOSUS, UNSPECIFIED SLE TYPE, UNSPECIFIED ORGAN INVOLVEMENT STATUS (HCC): ICD-10-CM

## 2025-04-21 DIAGNOSIS — M54.6 CHRONIC LEFT-SIDED THORACIC BACK PAIN: Primary | ICD-10-CM

## 2025-04-21 PROCEDURE — 99213 OFFICE O/P EST LOW 20 MIN: CPT | Performed by: NURSE PRACTITIONER

## 2025-04-21 RX ORDER — LORAZEPAM 0.5 MG/1
1 TABLET ORAL NIGHTLY PRN
COMMUNITY
End: 2025-04-26

## 2025-04-21 RX ORDER — LUMATEPERONE 10.5 MG/1
1 CAPSULE ORAL DAILY
COMMUNITY
Start: 2025-03-23 | End: 2025-04-26

## 2025-04-21 RX ORDER — DULOXETIN HYDROCHLORIDE 20 MG/1
1 CAPSULE, DELAYED RELEASE ORAL
COMMUNITY
End: 2025-04-26

## 2025-04-21 RX ORDER — CLONAZEPAM 0.5 MG/1
0.5 TABLET ORAL 2 TIMES DAILY
COMMUNITY
Start: 2024-11-27

## 2025-04-21 RX ORDER — TRIAMCINOLONE ACETONIDE 1 MG/G
CREAM TOPICAL
COMMUNITY
Start: 2025-02-12 | End: 2025-04-26

## 2025-04-21 RX ORDER — FAMOTIDINE 20 MG/1
1 TABLET, FILM COATED ORAL NIGHTLY PRN
COMMUNITY
End: 2025-04-26

## 2025-04-21 RX ORDER — FLUPHENAZINE HYDROCHLORIDE 2.5 MG/1
2.5 TABLET ORAL
COMMUNITY
Start: 2024-11-29

## 2025-04-21 RX ORDER — BENZTROPINE MESYLATE 0.5 MG/1
0.5 TABLET ORAL
COMMUNITY
Start: 2024-11-27 | End: 2025-04-26

## 2025-04-21 RX ORDER — ARIPIPRAZOLE 2 MG/1
1 TABLET ORAL
COMMUNITY

## 2025-04-21 RX ORDER — FLUTICASONE PROPIONATE AND SALMETEROL 100; 50 UG/1; UG/1
1 POWDER RESPIRATORY (INHALATION)
COMMUNITY
End: 2025-04-26

## 2025-04-21 RX ORDER — PROPRANOLOL HYDROCHLORIDE 10 MG/1
10 TABLET ORAL 3 TIMES DAILY PRN
COMMUNITY
Start: 2025-03-26

## 2025-04-21 RX ORDER — CHOLECALCIFEROL (VITAMIN D3) 50 MCG
2 TABLET ORAL DAILY
COMMUNITY

## 2025-04-21 ASSESSMENT — PATIENT HEALTH QUESTIONNAIRE - PHQ9
SUM OF ALL RESPONSES TO PHQ QUESTIONS 1-9: 0
SUM OF ALL RESPONSES TO PHQ QUESTIONS 1-9: 0
1. LITTLE INTEREST OR PLEASURE IN DOING THINGS: NOT AT ALL
SUM OF ALL RESPONSES TO PHQ QUESTIONS 1-9: 0
SUM OF ALL RESPONSES TO PHQ QUESTIONS 1-9: 0
2. FEELING DOWN, DEPRESSED OR HOPELESS: NOT AT ALL

## 2025-04-21 ASSESSMENT — ENCOUNTER SYMPTOMS: BACK PAIN: 1

## 2025-04-21 NOTE — PROGRESS NOTES
\"Have you been to the ER, urgent care clinic since your last visit?  Hospitalized since your last visit?\"    N/A    “Have you seen or consulted any other health care providers outside our system since your last visit?”         “Have you had a pap smear?”        No cervical cancer screening on file        
                    226 mg/dL    10                         240 mg/dL    10.5                       255 mg/dL    11                         269 mg/dL    11.5                       283 mg/dL    12                         298 mg/dL       Lab Results   Component Value Date    CHOL 167 11/11/2024    CHOL 165 08/29/2022    CHOL 160 04/04/2022     Lab Results   Component Value Date    TRIG 57 11/11/2024    TRIG 57 08/29/2022    TRIG 83 04/04/2022     Lab Results   Component Value Date    HDL 67 (H) 11/11/2024    HDL 58 08/29/2022    HDL 2.8 08/29/2022     No components found for: \"LDLCHOLESTEROL\", \"LDLCALC\"  Lab Results   Component Value Date    VLDL 11 08/29/2022    VLDL 17 04/04/2022    VLDL 12 07/09/2021     Lab Results   Component Value Date    CHOLHDLRATIO 2.5 11/11/2024    CHOLHDLRATIO 2.4 02/27/2020         Physical Exam  Vitals reviewed.   Constitutional:       General: She is not in acute distress.     Appearance: Normal appearance.   HENT:      Head: Normocephalic and atraumatic.   Pulmonary:      Effort: Pulmonary effort is normal.   Musculoskeletal:        Arms:       Comments: Numbness/tingling/itch triggered by palpation,    Neurological:      General: No focal deficit present.      Mental Status: She is alert and oriented to person, place, and time.   Psychiatric:         Mood and Affect: Mood normal.         Behavior: Behavior normal.         Thought Content: Thought content normal.         Judgment: Judgment normal.         Disclaimer:    The patient understands our medical plan.  Alternatives have been explained and offered.  The risks, benefits and significant side effects of all medications have been reviewed. Anticipated time course and progression of condition reviewed. All questions have been addressed.  She is encouraged to employ the information provided in the after visit summary, which was reviewed.      Where applicable, she is instructed to call the clinic if she has not been notified either

## 2025-04-23 ENCOUNTER — HOSPITAL ENCOUNTER (OUTPATIENT)
Facility: HOSPITAL | Age: 25
Discharge: HOME | End: 2025-04-26
Attending: STUDENT IN AN ORGANIZED HEALTH CARE EDUCATION/TRAINING PROGRAM
Payer: MEDICAID

## 2025-04-23 DIAGNOSIS — N63.0 BREAST NODULE: ICD-10-CM

## 2025-04-23 PROCEDURE — 76642 ULTRASOUND BREAST LIMITED: CPT

## 2025-04-26 DIAGNOSIS — R92.8 ABNORMAL ULTRASOUND OF BREAST: Primary | ICD-10-CM

## 2025-05-22 ENCOUNTER — TELEPHONE (OUTPATIENT)
Facility: CLINIC | Age: 25
End: 2025-05-22

## 2025-05-22 ENCOUNTER — TELEMEDICINE (OUTPATIENT)
Facility: CLINIC | Age: 25
End: 2025-05-22
Payer: MEDICAID

## 2025-05-22 DIAGNOSIS — M32.9 SYSTEMIC LUPUS ERYTHEMATOSUS, UNSPECIFIED SLE TYPE, UNSPECIFIED ORGAN INVOLVEMENT STATUS (HCC): ICD-10-CM

## 2025-05-22 DIAGNOSIS — E66.812 CLASS 2 OBESITY DUE TO EXCESS CALORIES WITH BODY MASS INDEX (BMI) OF 35.0 TO 35.9 IN ADULT, UNSPECIFIED WHETHER SERIOUS COMORBIDITY PRESENT: Primary | ICD-10-CM

## 2025-05-22 DIAGNOSIS — E66.09 CLASS 2 OBESITY DUE TO EXCESS CALORIES WITH BODY MASS INDEX (BMI) OF 35.0 TO 35.9 IN ADULT, UNSPECIFIED WHETHER SERIOUS COMORBIDITY PRESENT: Primary | ICD-10-CM

## 2025-05-22 DIAGNOSIS — I82.5Z2 CHRONIC DEEP VEIN THROMBOSIS (DVT) OF DISTAL VEIN OF LEFT LOWER EXTREMITY (HCC): ICD-10-CM

## 2025-05-22 DIAGNOSIS — F31.81 BIPOLAR 2 DISORDER (HCC): ICD-10-CM

## 2025-05-22 PROCEDURE — 99214 OFFICE O/P EST MOD 30 MIN: CPT | Performed by: STUDENT IN AN ORGANIZED HEALTH CARE EDUCATION/TRAINING PROGRAM

## 2025-05-22 ASSESSMENT — ENCOUNTER SYMPTOMS
SORE THROAT: 0
NAUSEA: 0
VOICE CHANGE: 0
SHORTNESS OF BREATH: 0
CONSTIPATION: 0
VOMITING: 0
WHEEZING: 0
PHOTOPHOBIA: 0
TROUBLE SWALLOWING: 0
ABDOMINAL PAIN: 0
EYE ITCHING: 0
EYE DISCHARGE: 0
BACK PAIN: 0
RHINORRHEA: 0
COUGH: 0
DIARRHEA: 0

## 2025-05-22 ASSESSMENT — PATIENT HEALTH QUESTIONNAIRE - PHQ9
SUM OF ALL RESPONSES TO PHQ QUESTIONS 1-9: 1
2. FEELING DOWN, DEPRESSED OR HOPELESS: SEVERAL DAYS
1. LITTLE INTEREST OR PLEASURE IN DOING THINGS: NOT AT ALL

## 2025-05-22 NOTE — ASSESSMENT & PLAN NOTE
- Currently taking hydroxychloroquine 200 mg daily for lupus management.  - Last rheumatologist appointment was on 06/09/2025, but the rheumatologist is leaving the office, necessitating finding a new specialist.  - Review of lupus management and medication adherence discussed.  - Referral to a new rheumatologist will be arranged.

## 2025-05-22 NOTE — ASSESSMENT & PLAN NOTE
- Patient is taking a blood thinner.  - Effectiveness and adherence to blood thinner medication discussed.  - Continued monitoring of symptoms and medication effectiveness advised.

## 2025-05-22 NOTE — ASSESSMENT & PLAN NOTE
- She has gained weight rapidly, currently weighing 217 pounds, up from 200 pounds.  - Wegovy 0.25 mg weekly has been prescribed, contingent upon insurance approval. Potential side effects such as constipation, bloating, and nausea were discussed.  - Dietary recommendations include avoiding food intake after 7:00 PM, exercising 4 to 5 times per week, and conducting weekly weigh-ins. Literature on weight loss foods will be provided through [employer].  - Follow-up appointment scheduled in 3 months to monitor weight and assess progress    Orders:    Semaglutide-Weight Management (WEGOVY) 0.25 MG/0.5ML SOAJ SC injection; Inject 0.25 mg into the skin every 7 days

## 2025-05-22 NOTE — PROGRESS NOTES
\"Have you been to the ER, urgent care clinic since your last visit?  Hospitalized since your last visit?\"    YES - When: approximately 6 days ago.  Where and Why: [facility]; nausea and vomiting.    “Have you seen or consulted any other health care providers outside our system since your last visit?”    YES - When: approximately 6 days ago.  Where and Why: [facility]; nausea and vomiting.     “Have you had a pap smear?”    YES - Where: [facility] 12/12/2024 Nurse/CMA to request most recent records if not in the chart    No cervical cancer screening on file         Click Here for Release of Records Request  
status (HCC)   - Currently taking hydroxychloroquine 200 mg daily for lupus management.  - Last rheumatologist appointment was on 06/09/2025, but the rheumatologist is leaving the office, necessitating finding a new specialist.  - Review of lupus management and medication adherence discussed.  - Referral to a new rheumatologist will be arranged.         Bipolar 2 disorder (HCC)    Monitored by specialist- no acute findings meriting change in the plan          Chronic deep vein thrombosis (DVT) of distal vein of left lower extremity (HCC)   - Patient is taking a blood thinner.  - Effectiveness and adherence to blood thinner medication discussed.  - Continued monitoring of symptoms and medication effectiveness advised.           No follow-ups on file.       Subjective   History of Present Illness  The patient presents via virtual visit for follow-up of weight management, SLE, chronic DVT, bipolar disorder.    She reports experiencing a rapid weight gain of 10 pounds over a short period, bringing her current weight to 217 pounds as of yesterday. Her weight was recorded as 216 pounds during an ER visit on 05/16/2025. Previously, she was 200 pounds at her last visit here, then increased to 210 pounds, and now to 217 pounds. She has expressed interest in exploring weight loss medications, specifically GLP-1 agonists such as Ozempic. She has recently initiated a new exercise regimen, aiming for four sessions per week, and has also made dietary modifications. Her goal is to achieve a weight of 160 pounds.    She is currently taking hydroxychloroquine for lupus. She was scheduled to see her rheumatologist on 06/09/2025, but she received a letter informing her that he is leaving the office, necessitating the search for a new doctor. She is no longer taking Abilify. Additionally, she is taking acid reflux medication and a blood thinner.      Weight Management  Pertinent negatives include no abdominal pain, arthralgias, chest

## 2025-06-27 ENCOUNTER — TELEMEDICINE (OUTPATIENT)
Facility: CLINIC | Age: 25
End: 2025-06-27

## 2025-06-27 DIAGNOSIS — F31.81 BIPOLAR 2 DISORDER (HCC): ICD-10-CM

## 2025-06-27 DIAGNOSIS — R41.3 MEMORY CHANGES: Primary | ICD-10-CM

## 2025-06-27 DIAGNOSIS — F33.1 MODERATE EPISODE OF RECURRENT MAJOR DEPRESSIVE DISORDER (HCC): ICD-10-CM

## 2025-06-27 DIAGNOSIS — I82.5Z2 CHRONIC DEEP VEIN THROMBOSIS (DVT) OF DISTAL VEIN OF LEFT LOWER EXTREMITY (HCC): ICD-10-CM

## 2025-06-27 DIAGNOSIS — M32.9 SYSTEMIC LUPUS ERYTHEMATOSUS, UNSPECIFIED SLE TYPE, UNSPECIFIED ORGAN INVOLVEMENT STATUS (HCC): ICD-10-CM

## 2025-06-27 PROBLEM — G05.3 LUPUS CEREBRITIS (HCC): Status: ACTIVE | Noted: 2018-09-18

## 2025-06-27 PROBLEM — I82.409 DEEP VEIN THROMBOSIS (DVT) OF LOWER EXTREMITY (HCC): Status: ACTIVE | Noted: 2025-06-27

## 2025-06-27 PROBLEM — M32.19 LUPUS CEREBRITIS (HCC): Status: ACTIVE | Noted: 2018-09-18

## 2025-06-27 ASSESSMENT — ENCOUNTER SYMPTOMS
DIARRHEA: 0
ABDOMINAL PAIN: 0
EYE DISCHARGE: 0
RHINORRHEA: 0
TROUBLE SWALLOWING: 0
VOICE CHANGE: 0
COUGH: 0
BACK PAIN: 0
CONSTIPATION: 0
PHOTOPHOBIA: 0
NAUSEA: 0
SHORTNESS OF BREATH: 0
EYE ITCHING: 0
VOMITING: 0
SORE THROAT: 0
WHEEZING: 0

## 2025-06-27 ASSESSMENT — PATIENT HEALTH QUESTIONNAIRE - PHQ9
8. MOVING OR SPEAKING SO SLOWLY THAT OTHER PEOPLE COULD HAVE NOTICED. OR THE OPPOSITE, BEING SO FIGETY OR RESTLESS THAT YOU HAVE BEEN MOVING AROUND A LOT MORE THAN USUAL: NOT AT ALL
1. LITTLE INTEREST OR PLEASURE IN DOING THINGS: NEARLY EVERY DAY
SUM OF ALL RESPONSES TO PHQ QUESTIONS 1-9: 17
4. FEELING TIRED OR HAVING LITTLE ENERGY: NEARLY EVERY DAY
SUM OF ALL RESPONSES TO PHQ QUESTIONS 1-9: 17
3. TROUBLE FALLING OR STAYING ASLEEP: NEARLY EVERY DAY
7. TROUBLE CONCENTRATING ON THINGS, SUCH AS READING THE NEWSPAPER OR WATCHING TELEVISION: NEARLY EVERY DAY
SUM OF ALL RESPONSES TO PHQ QUESTIONS 1-9: 17
6. FEELING BAD ABOUT YOURSELF - OR THAT YOU ARE A FAILURE OR HAVE LET YOURSELF OR YOUR FAMILY DOWN: SEVERAL DAYS
5. POOR APPETITE OR OVEREATING: SEVERAL DAYS
SUM OF ALL RESPONSES TO PHQ QUESTIONS 1-9: 17
10. IF YOU CHECKED OFF ANY PROBLEMS, HOW DIFFICULT HAVE THESE PROBLEMS MADE IT FOR YOU TO DO YOUR WORK, TAKE CARE OF THINGS AT HOME, OR GET ALONG WITH OTHER PEOPLE: EXTREMELY DIFFICULT
9. THOUGHTS THAT YOU WOULD BE BETTER OFF DEAD, OR OF HURTING YOURSELF: NOT AT ALL
2. FEELING DOWN, DEPRESSED OR HOPELESS: NEARLY EVERY DAY

## 2025-06-27 NOTE — ASSESSMENT & PLAN NOTE
Currently following with a specialist. No acute issues medically meriting a change in plan. Will continue to monitor peripherally.

## 2025-06-27 NOTE — PROGRESS NOTES
Christie Mac, was evaluated through a synchronous (real-time) audio-video encounter. The patient (or guardian if applicable) is aware that this is a billable service, which includes applicable co-pays. This Virtual Visit was conducted with patient's (and/or legal guardian's) consent. Patient identification was verified, and a caregiver was present when appropriate.   The patient was located at Home: [address]  [address]  [facility] [address]  Provider was located at Facility (Appt Dept): [address]  [place] [address]  Confirm you are appropriately licensed, registered, or certified to deliver care in the state where the patient is located as indicated above. If you are not or unsure, please re-schedule the visit: Yes, I confirm.     Christie Mac (:  2000) is a Established patient, presenting virtually for evaluation of the following:      Below is the assessment and plan developed based on review of pertinent history, physical exam, labs, studies, and medications.     Assessment & Plan  Memory changes   - Persistent memory issues despite seeing a neurologist; symptoms include hypervigilance and emotional blunting  - Lab results including HIV, syphilis, magnesium, folate, vitamin B12, thyroid hormone levels, and CMP are all within normal limits  - Discussed the potential impact of numerous psychiatric medications taken in the past on memory issues; depression could also be a contributing factor  - Referral to a neurologist for further evaluation  - Advised to maintain a healthy diet, consider a daily multivitamin, and stay hydrated      Orders:    BS - Blanca Bradley MD, Neurology, [place] ([facility])    Systemic lupus erythematosus, unspecified SLE type, unspecified organ involvement status (HCC)   Currently following with a specialist. No acute issues medically meriting a change in plan. Will continue to monitor peripherally.          Bipolar 2

## 2025-06-30 ENCOUNTER — TELEPHONE (OUTPATIENT)
Facility: CLINIC | Age: 25
End: 2025-06-30

## 2025-07-29 LAB
A/G RATIO: 1.4 RATIO (ref 1.1–2.6)
ALBUMIN: 4.1 G/DL (ref 3.5–5)
ALP BLD-CCNC: 80 U/L (ref 25–115)
ALT SERPL-CCNC: 14 U/L (ref 5–40)
ANION GAP SERPL CALCULATED.3IONS-SCNC: 10 MMOL/L (ref 3–15)
AST SERPL-CCNC: 18 U/L (ref 10–37)
BASOPHILS # BLD: 1 % (ref 0–2)
BASOPHILS ABSOLUTE: 0 K/UL (ref 0–0.2)
BILIRUB SERPL-MCNC: 0.4 MG/DL (ref 0.2–1.2)
BILIRUBIN DIRECT: <0.2 MG/DL (ref 0–0.3)
BUN BLDV-MCNC: 9 MG/DL (ref 6–22)
CALCIUM SERPL-MCNC: 9.1 MG/DL (ref 8.4–10.5)
CHLORIDE BLD-SCNC: 102 MMOL/L (ref 98–110)
CO2: 25 MMOL/L (ref 20–32)
CREAT SERPL-MCNC: 0.6 MG/DL (ref 0.5–1.2)
EOSINOPHIL # BLD: 5 % (ref 0–6)
EOSINOPHILS ABSOLUTE: 0.2 K/UL (ref 0–0.5)
GFR, ESTIMATED: >90 ML/MIN/1.73 SQ.M.
GLOBULIN: 2.9 G/DL (ref 2–4)
GLUCOSE: 85 MG/DL (ref 70–99)
HCT VFR BLD CALC: 41 % (ref 35.1–46.5)
HEMOGLOBIN: 13 G/DL (ref 11.7–15.5)
LYMPHOCYTES # BLD: 33 % (ref 20–45)
LYMPHOCYTES ABSOLUTE: 1.2 K/UL (ref 1–4.8)
MCH RBC QN AUTO: 27 PG (ref 26–34)
MCHC RBC AUTO-ENTMCNC: 32 G/DL (ref 31–36)
MCV RBC AUTO: 85 FL (ref 80–99)
MONOCYTES ABSOLUTE: 0.4 K/UL (ref 0.1–1)
MONOCYTES: 10 % (ref 3–12)
NEUTROPHILS ABSOLUTE: 1.8 K/UL (ref 1.8–7.7)
NEUTROPHILS SEGMENTED: 51 % (ref 40–75)
PDW BLD-RTO: 13.1 % (ref 10–15.5)
PLATELET # BLD: 232 K/UL (ref 140–440)
PMV BLD AUTO: 11.3 FL (ref 9–13)
POTASSIUM SERPL-SCNC: 4.1 MMOL/L (ref 3.5–5.5)
RBC # BLD: 4.85 M/UL (ref 3.8–5.2)
SODIUM BLD-SCNC: 137 MMOL/L (ref 133–145)
TOTAL PROTEIN: 7 G/DL (ref 6.4–8.3)
WBC # BLD: 3.6 K/UL (ref 4–11)

## 2025-08-07 ENCOUNTER — OFFICE VISIT (OUTPATIENT)
Age: 25
End: 2025-08-07
Payer: MEDICAID

## 2025-08-07 VITALS
HEIGHT: 64 IN | DIASTOLIC BLOOD PRESSURE: 73 MMHG | SYSTOLIC BLOOD PRESSURE: 128 MMHG | OXYGEN SATURATION: 100 % | BODY MASS INDEX: 35.68 KG/M2 | WEIGHT: 209 LBS | TEMPERATURE: 97.8 F | HEART RATE: 79 BPM

## 2025-08-07 DIAGNOSIS — F51.01 PRIMARY INSOMNIA: ICD-10-CM

## 2025-08-07 DIAGNOSIS — R41.3 MEMORY CHANGES: Primary | ICD-10-CM

## 2025-08-07 DIAGNOSIS — E66.812 CLASS 2 OBESITY DUE TO EXCESS CALORIES WITH BODY MASS INDEX (BMI) OF 35.0 TO 35.9 IN ADULT, UNSPECIFIED WHETHER SERIOUS COMORBIDITY PRESENT: ICD-10-CM

## 2025-08-07 DIAGNOSIS — E66.09 CLASS 2 OBESITY DUE TO EXCESS CALORIES WITH BODY MASS INDEX (BMI) OF 35.0 TO 35.9 IN ADULT, UNSPECIFIED WHETHER SERIOUS COMORBIDITY PRESENT: ICD-10-CM

## 2025-08-07 PROCEDURE — 99203 OFFICE O/P NEW LOW 30 MIN: CPT | Performed by: NURSE PRACTITIONER

## 2025-08-07 ASSESSMENT — ENCOUNTER SYMPTOMS
DIARRHEA: 0
SINUS PRESSURE: 1
COUGH: 1
EYE PAIN: 0
SINUS PAIN: 1
APNEA: 0
VOMITING: 0
CONSTIPATION: 1
BACK PAIN: 1
PHOTOPHOBIA: 0
SHORTNESS OF BREATH: 0
NAUSEA: 1